# Patient Record
Sex: FEMALE | Race: BLACK OR AFRICAN AMERICAN | Employment: OTHER | URBAN - NONMETROPOLITAN AREA
[De-identification: names, ages, dates, MRNs, and addresses within clinical notes are randomized per-mention and may not be internally consistent; named-entity substitution may affect disease eponyms.]

---

## 2017-09-16 ENCOUNTER — NURSE TRIAGE (OUTPATIENT)
Dept: ADMINISTRATIVE | Age: 52
End: 2017-09-16

## 2017-09-16 ENCOUNTER — HOSPITAL ENCOUNTER (EMERGENCY)
Age: 52
Discharge: HOME | End: 2017-09-16
Payer: COMMERCIAL

## 2017-09-16 VITALS
TEMPERATURE: 98.7 F | DIASTOLIC BLOOD PRESSURE: 94 MMHG | HEART RATE: 78 BPM | OXYGEN SATURATION: 100 % | HEIGHT: 68 IN | WEIGHT: 170 LBS | SYSTOLIC BLOOD PRESSURE: 140 MMHG | BODY MASS INDEX: 25.76 KG/M2 | RESPIRATION RATE: 16 BRPM

## 2017-09-16 DIAGNOSIS — M70.21 OLECRANON BURSITIS, RIGHT ELBOW: Primary | ICD-10-CM

## 2017-09-16 LAB
CHARACTER,SYN.FL: ABNORMAL
COLOR FLUID: YELLOW
CRYSTALS, FLUID: ABNORMAL
LYMPHOCYTE, SYNOVIAL FLUID: 15 % (ref 25–95)
MONOCYTE, FLUID: 20 % (ref 25–95)
SEGS, SYNOVIAL FLUID: 65 % (ref 0–25)
WBC FLUID: ABNORMAL MM3

## 2017-09-16 PROCEDURE — 87070 CULTURE OTHR SPECIMN AEROBIC: CPT

## 2017-09-16 PROCEDURE — 87075 CULTR BACTERIA EXCEPT BLOOD: CPT

## 2017-09-16 PROCEDURE — 89051 BODY FLUID CELL COUNT: CPT

## 2017-09-16 PROCEDURE — 87205 SMEAR GRAM STAIN: CPT

## 2017-09-16 PROCEDURE — 20605 DRAIN/INJ JOINT/BURSA W/O US: CPT

## 2017-09-16 PROCEDURE — 89060 EXAM SYNOVIAL FLUID CRYSTALS: CPT

## 2017-09-16 PROCEDURE — 99283 EMERGENCY DEPT VISIT LOW MDM: CPT

## 2017-09-16 RX ORDER — CEPHALEXIN 500 MG/1
500 CAPSULE ORAL 3 TIMES DAILY
Qty: 30 CAPSULE | Refills: 0 | Status: SHIPPED | OUTPATIENT
Start: 2017-09-16 | End: 2017-09-26

## 2017-09-16 ASSESSMENT — ENCOUNTER SYMPTOMS
NAUSEA: 0
BACK PAIN: 0

## 2017-09-16 ASSESSMENT — PAIN DESCRIPTION - DESCRIPTORS: DESCRIPTORS: SORE

## 2017-09-16 ASSESSMENT — PAIN DESCRIPTION - LOCATION: LOCATION: ELBOW

## 2017-09-16 ASSESSMENT — PAIN SCALES - GENERAL: PAINLEVEL_OUTOF10: 5

## 2017-09-16 ASSESSMENT — PAIN DESCRIPTION - ORIENTATION: ORIENTATION: RIGHT

## 2017-09-16 ASSESSMENT — PAIN DESCRIPTION - FREQUENCY: FREQUENCY: INTERMITTENT

## 2017-09-21 LAB
ANAEROBIC CULTURE: NORMAL
BODY FLUID CULTURE, STERILE: NORMAL
GRAM STAIN RESULT: NORMAL

## 2018-04-10 ENCOUNTER — OFFICE VISIT (OUTPATIENT)
Dept: FAMILY MEDICINE CLINIC | Age: 53
End: 2018-04-10
Payer: COMMERCIAL

## 2018-04-10 VITALS
WEIGHT: 196 LBS | DIASTOLIC BLOOD PRESSURE: 78 MMHG | HEIGHT: 68 IN | RESPIRATION RATE: 14 BRPM | BODY MASS INDEX: 29.7 KG/M2 | OXYGEN SATURATION: 97 % | HEART RATE: 106 BPM | SYSTOLIC BLOOD PRESSURE: 144 MMHG

## 2018-04-10 DIAGNOSIS — Z13.220 SCREENING FOR HYPERLIPIDEMIA: ICD-10-CM

## 2018-04-10 DIAGNOSIS — Z72.0 TOBACCO ABUSE: Chronic | ICD-10-CM

## 2018-04-10 DIAGNOSIS — I10 ESSENTIAL HYPERTENSION: Primary | ICD-10-CM

## 2018-04-10 DIAGNOSIS — M05.9 RHEUMATOID ARTHRITIS WITH POSITIVE RHEUMATOID FACTOR, INVOLVING UNSPECIFIED SITE (HCC): ICD-10-CM

## 2018-04-10 DIAGNOSIS — Z79.52 CURRENT CHRONIC USE OF SYSTEMIC STEROIDS: ICD-10-CM

## 2018-04-10 PROCEDURE — 1036F TOBACCO NON-USER: CPT | Performed by: NURSE PRACTITIONER

## 2018-04-10 PROCEDURE — G8427 DOCREV CUR MEDS BY ELIG CLIN: HCPCS | Performed by: NURSE PRACTITIONER

## 2018-04-10 PROCEDURE — 99213 OFFICE O/P EST LOW 20 MIN: CPT | Performed by: NURSE PRACTITIONER

## 2018-04-10 PROCEDURE — G8419 CALC BMI OUT NRM PARAM NOF/U: HCPCS | Performed by: NURSE PRACTITIONER

## 2018-04-10 PROCEDURE — 3017F COLORECTAL CA SCREEN DOC REV: CPT | Performed by: NURSE PRACTITIONER

## 2018-04-10 PROCEDURE — 3014F SCREEN MAMMO DOC REV: CPT | Performed by: NURSE PRACTITIONER

## 2018-04-10 RX ORDER — HYDROCHLOROTHIAZIDE 12.5 MG/1
12.5 TABLET ORAL DAILY
Qty: 30 TABLET | Refills: 1 | Status: SHIPPED | OUTPATIENT
Start: 2018-04-10 | End: 2018-08-23 | Stop reason: ALTCHOICE

## 2018-04-10 ASSESSMENT — ENCOUNTER SYMPTOMS
SHORTNESS OF BREATH: 0
COUGH: 0
BACK PAIN: 1
ABDOMINAL PAIN: 0
NAUSEA: 0

## 2018-04-10 ASSESSMENT — PATIENT HEALTH QUESTIONNAIRE - PHQ9
SUM OF ALL RESPONSES TO PHQ9 QUESTIONS 1 & 2: 0
1. LITTLE INTEREST OR PLEASURE IN DOING THINGS: 0
2. FEELING DOWN, DEPRESSED OR HOPELESS: 0
SUM OF ALL RESPONSES TO PHQ QUESTIONS 1-9: 0

## 2018-04-23 ENCOUNTER — OFFICE VISIT (OUTPATIENT)
Dept: FAMILY MEDICINE CLINIC | Age: 53
End: 2018-04-23
Payer: COMMERCIAL

## 2018-04-23 VITALS
HEIGHT: 68 IN | WEIGHT: 194.4 LBS | DIASTOLIC BLOOD PRESSURE: 72 MMHG | RESPIRATION RATE: 13 BRPM | OXYGEN SATURATION: 98 % | HEART RATE: 78 BPM | SYSTOLIC BLOOD PRESSURE: 132 MMHG | BODY MASS INDEX: 29.46 KG/M2

## 2018-04-23 DIAGNOSIS — I10 ESSENTIAL HYPERTENSION: Primary | ICD-10-CM

## 2018-04-23 PROCEDURE — G8419 CALC BMI OUT NRM PARAM NOF/U: HCPCS | Performed by: NURSE PRACTITIONER

## 2018-04-23 PROCEDURE — G8427 DOCREV CUR MEDS BY ELIG CLIN: HCPCS | Performed by: NURSE PRACTITIONER

## 2018-04-23 PROCEDURE — 3017F COLORECTAL CA SCREEN DOC REV: CPT | Performed by: NURSE PRACTITIONER

## 2018-04-23 PROCEDURE — 1036F TOBACCO NON-USER: CPT | Performed by: NURSE PRACTITIONER

## 2018-04-23 PROCEDURE — 99213 OFFICE O/P EST LOW 20 MIN: CPT | Performed by: NURSE PRACTITIONER

## 2018-04-23 RX ORDER — LISINOPRIL AND HYDROCHLOROTHIAZIDE 20; 12.5 MG/1; MG/1
1 TABLET ORAL DAILY
Qty: 30 TABLET | Refills: 3 | Status: SHIPPED | OUTPATIENT
Start: 2018-04-23 | End: 2018-08-23 | Stop reason: ALTCHOICE

## 2018-04-23 ASSESSMENT — ENCOUNTER SYMPTOMS
COUGH: 0
ABDOMINAL PAIN: 0
NAUSEA: 0
SHORTNESS OF BREATH: 0

## 2018-05-12 LAB
AVERAGE GLUCOSE: 123 MG/DL (ref 66–114)
CHOLESTEROL/HDL RATIO: 5.2
CHOLESTEROL: 215 MG/DL
HBA1C MFR BLD: 5.9 % (ref 4.2–5.8)
HDLC SERPL-MCNC: 41 MG/DL
LDL CHOLESTEROL CALCULATED: 152 MG/DL
LDL/HDL RATIO: 3.7
TRIGL SERPL-MCNC: 111 MG/DL
VLDLC SERPL CALC-MCNC: 22 MG/DL

## 2018-05-15 DIAGNOSIS — E78.2 MIXED HYPERLIPIDEMIA: Primary | ICD-10-CM

## 2018-05-15 DIAGNOSIS — R73.01 IFG (IMPAIRED FASTING GLUCOSE): ICD-10-CM

## 2018-05-15 RX ORDER — ATORVASTATIN CALCIUM 20 MG/1
20 TABLET, FILM COATED ORAL DAILY
Qty: 30 TABLET | Refills: 11 | Status: SHIPPED | OUTPATIENT
Start: 2018-05-15 | End: 2019-03-03 | Stop reason: SDUPTHER

## 2018-06-12 ENCOUNTER — HOSPITAL ENCOUNTER (OUTPATIENT)
Dept: WOMENS IMAGING | Age: 53
Discharge: HOME | End: 2018-06-12
Payer: COMMERCIAL

## 2018-06-12 DIAGNOSIS — Z13.9 VISIT FOR SCREENING: ICD-10-CM

## 2018-06-12 PROCEDURE — 77067 SCR MAMMO BI INCL CAD: CPT

## 2018-06-13 LAB
ALBUMIN SERPL-MCNC: 4.5 G/DL (ref 3.5–5.2)
ALK PHOSPHATASE: 68 U/L (ref 30–111)
ALT SERPL-CCNC: 22 U/L (ref 5–40)
AST SERPL-CCNC: 26 U/L (ref 9–40)
AVERAGE GLUCOSE: 126 MG/DL (ref 66–114)
BILIRUB SERPL-MCNC: 0.4 MG/DL
BILIRUBIN DIRECT: <0.2 MG/DL
CHOLESTEROL/HDL RATIO: 3.5
CHOLESTEROL: 167 MG/DL
HBA1C MFR BLD: 6 % (ref 4.2–5.8)
HDLC SERPL-MCNC: 48 MG/DL
LDL CHOLESTEROL CALCULATED: 82 MG/DL
LDL/HDL RATIO: 1.7
TOTAL PROTEIN: 7.7 G/DL (ref 6.1–8.3)
TRIGL SERPL-MCNC: 186 MG/DL
VLDLC SERPL CALC-MCNC: 37 MG/DL

## 2018-08-23 ENCOUNTER — TELEPHONE (OUTPATIENT)
Dept: FAMILY MEDICINE CLINIC | Age: 53
End: 2018-08-23

## 2018-08-23 DIAGNOSIS — I10 ESSENTIAL HYPERTENSION: Primary | ICD-10-CM

## 2018-08-23 RX ORDER — OLMESARTAN MEDOXOMIL AND HYDROCHLOROTHIAZIDE 20/12.5 20; 12.5 MG/1; MG/1
1 TABLET ORAL DAILY
Qty: 30 TABLET | Refills: 3 | Status: SHIPPED | OUTPATIENT
Start: 2018-08-23 | End: 2018-09-11 | Stop reason: ALTCHOICE

## 2018-09-10 ENCOUNTER — TELEPHONE (OUTPATIENT)
Dept: FAMILY MEDICINE CLINIC | Age: 53
End: 2018-09-10

## 2018-09-10 ENCOUNTER — PATIENT MESSAGE (OUTPATIENT)
Dept: FAMILY MEDICINE CLINIC | Age: 53
End: 2018-09-10

## 2018-09-10 DIAGNOSIS — I10 ESSENTIAL HYPERTENSION: Primary | ICD-10-CM

## 2018-09-10 NOTE — TELEPHONE ENCOUNTER
09/10/2018 Patient called office saying her olmesartan-hydrochlorothiazide (BENICAR HCT) 20-12.5 MG per tablet is being recalled by the FDA and she has been out for a few days. Needs to know what else Joselin Mckinney wants to put her on. Patient uses [facility] on [address]. Please advise patient at [phone]. da

## 2018-09-11 RX ORDER — LOSARTAN POTASSIUM AND HYDROCHLOROTHIAZIDE 12.5; 5 MG/1; MG/1
1 TABLET ORAL DAILY
Qty: 90 TABLET | Refills: 3 | Status: SHIPPED | OUTPATIENT
Start: 2018-09-11 | End: 2019-06-19 | Stop reason: SDUPTHER

## 2018-09-12 ENCOUNTER — TELEPHONE (OUTPATIENT)
Dept: FAMILY MEDICINE CLINIC | Age: 53
End: 2018-09-12

## 2018-09-12 NOTE — TELEPHONE ENCOUNTER
She was on Lisinopril-HCTZ but this caused cough and was switched to benicar HCT but this is on recall and most recent switched to Losartan HCTZ which was just called in.

## 2019-03-03 DIAGNOSIS — E78.2 MIXED HYPERLIPIDEMIA: ICD-10-CM

## 2019-03-04 RX ORDER — ATORVASTATIN CALCIUM 20 MG/1
TABLET, FILM COATED ORAL
Qty: 30 TABLET | Refills: 10 | Status: SHIPPED | OUTPATIENT
Start: 2019-03-04 | End: 2019-06-19 | Stop reason: SDUPTHER

## 2019-03-18 ENCOUNTER — TELEPHONE (OUTPATIENT)
Dept: FAMILY MEDICINE CLINIC | Age: 54
End: 2019-03-18

## 2019-06-18 ENCOUNTER — HOSPITAL ENCOUNTER (OUTPATIENT)
Dept: WOMENS IMAGING | Age: 54
Discharge: HOME | End: 2019-06-18
Payer: COMMERCIAL

## 2019-06-18 DIAGNOSIS — Z12.31 VISIT FOR SCREENING MAMMOGRAM: ICD-10-CM

## 2019-06-18 PROCEDURE — 77063 BREAST TOMOSYNTHESIS BI: CPT

## 2019-06-19 DIAGNOSIS — I10 ESSENTIAL HYPERTENSION: ICD-10-CM

## 2019-06-19 DIAGNOSIS — E78.2 MIXED HYPERLIPIDEMIA: ICD-10-CM

## 2019-06-19 RX ORDER — LOSARTAN POTASSIUM AND HYDROCHLOROTHIAZIDE 12.5; 5 MG/1; MG/1
1 TABLET ORAL DAILY
Qty: 90 TABLET | Refills: 0 | Status: SHIPPED | OUTPATIENT
Start: 2019-06-19 | End: 2019-09-16 | Stop reason: SDUPTHER

## 2019-06-19 RX ORDER — ATORVASTATIN CALCIUM 20 MG/1
TABLET, FILM COATED ORAL
Qty: 90 TABLET | Refills: 0 | Status: SHIPPED | OUTPATIENT
Start: 2019-06-19 | End: 2020-01-30

## 2019-06-19 NOTE — TELEPHONE ENCOUNTER
[facility] fax received 90 day supply refill on Atorvastatin 20 mg tablets. Pharmacy change for patient.

## 2019-07-10 ENCOUNTER — OFFICE VISIT (OUTPATIENT)
Dept: FAMILY MEDICINE CLINIC | Age: 54
End: 2019-07-10
Payer: COMMERCIAL

## 2019-07-10 VITALS
HEIGHT: 67 IN | DIASTOLIC BLOOD PRESSURE: 84 MMHG | RESPIRATION RATE: 16 BRPM | HEART RATE: 85 BPM | SYSTOLIC BLOOD PRESSURE: 124 MMHG | BODY MASS INDEX: 32.07 KG/M2 | WEIGHT: 204.3 LBS

## 2019-07-10 DIAGNOSIS — Z79.52 ON PREDNISONE THERAPY: ICD-10-CM

## 2019-07-10 DIAGNOSIS — R73.01 IFG (IMPAIRED FASTING GLUCOSE): ICD-10-CM

## 2019-07-10 DIAGNOSIS — I10 ESSENTIAL HYPERTENSION: ICD-10-CM

## 2019-07-10 DIAGNOSIS — M06.9 RHEUMATOID ARTHRITIS, INVOLVING UNSPECIFIED SITE, UNSPECIFIED RHEUMATOID FACTOR PRESENCE: ICD-10-CM

## 2019-07-10 DIAGNOSIS — Z00.00 WELL ADULT EXAM: Primary | ICD-10-CM

## 2019-07-10 DIAGNOSIS — E78.2 MIXED HYPERLIPIDEMIA: ICD-10-CM

## 2019-07-10 PROCEDURE — 99396 PREV VISIT EST AGE 40-64: CPT | Performed by: NURSE PRACTITIONER

## 2019-07-10 PROCEDURE — 90715 TDAP VACCINE 7 YRS/> IM: CPT | Performed by: NURSE PRACTITIONER

## 2019-07-10 PROCEDURE — 90471 IMMUNIZATION ADMIN: CPT | Performed by: NURSE PRACTITIONER

## 2019-07-10 ASSESSMENT — PATIENT HEALTH QUESTIONNAIRE - PHQ9
1. LITTLE INTEREST OR PLEASURE IN DOING THINGS: 0
2. FEELING DOWN, DEPRESSED OR HOPELESS: 0
SUM OF ALL RESPONSES TO PHQ QUESTIONS 1-9: 0
SUM OF ALL RESPONSES TO PHQ QUESTIONS 1-9: 0
SUM OF ALL RESPONSES TO PHQ9 QUESTIONS 1 & 2: 0

## 2019-07-10 ASSESSMENT — ENCOUNTER SYMPTOMS
SHORTNESS OF BREATH: 0
BACK PAIN: 1
NAUSEA: 0
COUGH: 0
ABDOMINAL PAIN: 0

## 2019-07-10 NOTE — PROGRESS NOTES
Subjective:      Patient ID: Judy Garcia is a 62 y.o. female. HPI  : Annual Exam    Chief Complaint   Patient presents with    Check-Up     tiredness, weakness all the time. Complains of tiredness and weakness all day. Issues with getting to sleep. Sluggish in AM.  Energy level low. Motivation is there. Increase thirst and urinary frequency. Chronic prednisone therapy from [facility]      Lab Results   Component Value Date    LABA1C 6.0 (H) 06/12/2018     No results found for: EAG    Vitals:    07/10/19 0749   BP: 124/84   Pulse: 85   Resp: 16         Patient Active Problem List   Diagnosis    Uvulitis    Leukocytosis    Hypokalemia    History of rheumatoid arthritis    GERD (gastroesophageal reflux disease)    Chronic use of steroids    Dysphagia    Tobacco abuse    Esophageal dysmotility    Back pain, chronic    Essential hypertension    Rheumatoid arthritis ([address]     COLONOSCOPY - 2013  MAMMO - 2019  PAP - 2018    Dr. Mcqueen Arn - [facility]    Chronic Steroid Therapy and Immunosuppressant for RA. BP wnl. On Hyzaar 50-12.5 mg. Tolerating well.      BP Readings from Last 3 Encounters:   07/10/19 124/84   04/23/18 132/72   04/10/18 (!) 144/78       Due for annual labs    Lab Results   Component Value Date    LABA1C 6.0 (H) 06/12/2018    LABA1C 5.9 (H) 05/11/2018    LABA1C 5.5 09/14/2015     No results found for: EAG    No components found for: CHLPL  Lab Results   Component Value Date    TRIG 186 (H) 06/12/2018    TRIG 111 05/11/2018    TRIG 125 09/14/2015     Lab Results   Component Value Date    HDL 48 06/12/2018    HDL 41 05/11/2018    HDL 41 09/14/2015     Lab Results   Component Value Date    LDLCALC 82 06/12/2018    LDLCALC 152 (H) 05/11/2018    LDLCALC 78 09/14/2015     Lab Results   Component Value Date    LABVLDL 37 (H) 06/12/2018    LABVLDL 22 05/11/2018         Chemistry        Component Value Date/Time     05/20/2016 0841    K 4.3 05/20/2016 0841     05/20/2016 0841 CO2 26 05/20/2016 0841    BUN 7 05/20/2016 0841    CREATININE 0.5 05/20/2016 0841        Component Value Date/Time    CALCIUM 9.5 05/20/2016 0841    ALKPHOS 68 06/12/2018 1110    ALKPHOS 61 01/30/2015 1424    AST 26 06/12/2018 1110    ALT 22 06/12/2018 1110    BILITOT 0.4 06/12/2018 1110            Lab Results   Component Value Date    TSH 4.420 (H) 09/16/2014       Lab Results   Component Value Date    WBC 7.5 05/20/2016    HGB 13.8 05/20/2016    HCT 42.1 05/20/2016    MCV 96.2 05/20/2016     05/20/2016         Health Maintenance   Topic Date Due    DTaP/Tdap/Td vaccine (1 - Tdap) 06/14/1982    Shingles Vaccine (1 of 2) 06/14/2013    Cervical cancer screen  06/01/2016    Potassium monitoring  05/20/2017    Creatinine monitoring  05/20/2017    A1C test (Diabetic or Prediabetic)  06/12/2019    Flu vaccine (1) 09/01/2019    Breast cancer screen  06/18/2021    Lipid screen  06/12/2023    Colon cancer screen colonoscopy  01/23/2024    Hepatitis C screen  Completed    HIV screen  Completed    Pneumococcal 0-64 years Vaccine  Aged [employer] History   Administered Date(s) Administered    FLUARIX 3 YEARS AND OVER 11/02/2015    Influenza Virus Vaccine 10/01/2013, 09/22/2014    Influenza, Marlyse Luz, 3 yrs and older, IM, PF (Fluzone 3 yrs and older or Afluria 5 yrs and older) 09/13/2016    Pneumococcal Polysaccharide (Eysfyamvv14) 08/01/2013       Review of Systems   Constitutional: Positive for fatigue. Negative for chills and fever. HENT: Negative. Eyes: Negative for visual disturbance. Respiratory: Negative for cough and shortness of breath. Cardiovascular: Negative for chest pain. Gastrointestinal: Negative for abdominal pain and nausea. Musculoskeletal: Positive for arthralgias and back pain. Skin: Negative for rash. Neurological: Negative for dizziness, light-headedness and headaches. Psychiatric/Behavioral: Negative.         Objective:   Physical Exam   Constitutional:

## 2019-07-22 LAB
ABSOLUTE BASO #: 0.1 X10E9/L (ref 0–0.9)
ABSOLUTE EOS #: 0.1 X10E9/L (ref 0–0.4)
ABSOLUTE LYMPH #: 1.1 X10E9/L (ref 1–3.5)
ABSOLUTE MONO #: 0.2 X10E9/L (ref 0–0.9)
ABSOLUTE NEUT #: 5.7 X10E9/L (ref 1.5–6.6)
ALBUMIN SERPL-MCNC: 4.1 G/DL (ref 3.2–5.3)
ALK PHOSPHATASE: 64 U/L (ref 39–130)
ALT SERPL-CCNC: 18 U/L (ref 0–31)
ANION GAP SERPL CALCULATED.3IONS-SCNC: 12 MMOL/L (ref 4–12)
AST SERPL-CCNC: 20 U/L (ref 0–41)
AVERAGE GLUCOSE: 131 MG/DL
BASOPHILS RELATIVE PERCENT: 0.8 %
BILIRUB SERPL-MCNC: 0.8 MG/DL (ref 0.3–1.2)
BUN BLDV-MCNC: 13 MG/DL (ref 5–23)
CALCIUM SERPL-MCNC: 9.4 MG/DL (ref 8.5–10.5)
CHLORIDE BLD-SCNC: 104 MMOL/L (ref 98–109)
CHOLESTEROL/HDL RATIO: 3.7 (ref 1–5)
CHOLESTEROL: 153 MG/DL (ref 150–200)
CO2: 25 MMOL/L (ref 22–32)
CREAT SERPL-MCNC: 0.72 MG/DL (ref 0.4–1)
EGFR AFRICAN AMERICAN: >60 ML/MIN/1.73SQ.M
EGFR IF NONAFRICAN AMERICAN: >60 ML/MIN/1.73SQ.M
EOSINOPHILS RELATIVE PERCENT: 1.4 %
GLUCOSE: 103 MG/DL (ref 65–99)
HBA1C MFR BLD: 6.2 % (ref 4.4–6.4)
HCT VFR BLD CALC: 41.4 % (ref 35–47)
HDLC SERPL-MCNC: 41 MG/DL
HEMOGLOBIN: 13.5 G/DL (ref 11.7–16)
LDL CHOLESTEROL CALCULATED: 84 MG/DL
LDL/HDL RATIO: 2
LYMPHOCYTE %: 15.8 %
MCH RBC QN AUTO: 30.5 PG (ref 26–33.5)
MCHC RBC AUTO-ENTMCNC: 32.7 G/DL (ref 32–36)
MCV RBC AUTO: 93 FL (ref 81–100)
MONOCYTES # BLD: 2.3 %
NEUTROPHILS RELATIVE PERCENT: 79.7 %
PDW BLD-RTO: 15.1 % (ref 11.5–14.7)
PLATELETS: ABNORMAL X10E9/L (ref 150–450)
PMV BLD AUTO: ABNORMAL FL (ref 7–12)
POTASSIUM SERPL-SCNC: 4 MMOL/L (ref 3.5–5)
RBC: 4.43 X10E12/L (ref 3.8–5.2)
SODIUM BLD-SCNC: 141 MMOL/L (ref 134–146)
TOTAL PROTEIN: 7.8 G/DL (ref 6–8)
TRIGL SERPL-MCNC: 141 MG/DL (ref 27–150)
TSH SERPL DL<=0.05 MIU/L-ACNC: 2.68 UIU/ML (ref 0.49–4.67)
VLDLC SERPL CALC-MCNC: 28 MG/DL (ref 0–30)
WBC: 7.2 X10E9/L (ref 4.8–10.8)

## 2019-07-23 DIAGNOSIS — R73.01 IFG (IMPAIRED FASTING GLUCOSE): Primary | ICD-10-CM

## 2019-07-23 DIAGNOSIS — E78.2 MIXED HYPERLIPIDEMIA: ICD-10-CM

## 2019-08-10 ENCOUNTER — APPOINTMENT (OUTPATIENT)
Dept: GENERAL RADIOLOGY | Age: 54
End: 2019-08-10
Payer: COMMERCIAL

## 2019-08-10 ENCOUNTER — HOSPITAL ENCOUNTER (EMERGENCY)
Age: 54
Discharge: HOME | End: 2019-08-10
Payer: COMMERCIAL

## 2019-08-10 ENCOUNTER — APPOINTMENT (OUTPATIENT)
Dept: CT IMAGING | Age: 54
End: 2019-08-10
Payer: COMMERCIAL

## 2019-08-10 VITALS
HEART RATE: 98 BPM | BODY MASS INDEX: 31.82 KG/M2 | RESPIRATION RATE: 14 BRPM | HEIGHT: 66 IN | TEMPERATURE: 98.2 F | OXYGEN SATURATION: 98 % | WEIGHT: 198 LBS | DIASTOLIC BLOOD PRESSURE: 86 MMHG | SYSTOLIC BLOOD PRESSURE: 137 MMHG

## 2019-08-10 DIAGNOSIS — S92.414A CLOSED NONDISPLACED FRACTURE OF PROXIMAL PHALANX OF RIGHT GREAT TOE, INITIAL ENCOUNTER: ICD-10-CM

## 2019-08-10 DIAGNOSIS — S42.401A ELBOW FRACTURE, RIGHT, CLOSED, INITIAL ENCOUNTER: Primary | ICD-10-CM

## 2019-08-10 DIAGNOSIS — S16.1XXA CERVICAL STRAIN, ACUTE, INITIAL ENCOUNTER: ICD-10-CM

## 2019-08-10 PROCEDURE — 99284 EMERGENCY DEPT VISIT MOD MDM: CPT

## 2019-08-10 PROCEDURE — 2709999900 HC NON-CHARGEABLE SUPPLY

## 2019-08-10 PROCEDURE — 29105 APPLICATION LONG ARM SPLINT: CPT

## 2019-08-10 PROCEDURE — 72125 CT NECK SPINE W/O DYE: CPT

## 2019-08-10 PROCEDURE — 70450 CT HEAD/BRAIN W/O DYE: CPT

## 2019-08-10 PROCEDURE — 73080 X-RAY EXAM OF ELBOW: CPT

## 2019-08-10 PROCEDURE — 6370000000 HC RX 637 (ALT 250 FOR IP): Performed by: STUDENT IN AN ORGANIZED HEALTH CARE EDUCATION/TRAINING PROGRAM

## 2019-08-10 PROCEDURE — 73630 X-RAY EXAM OF FOOT: CPT

## 2019-08-10 RX ORDER — CYCLOBENZAPRINE HCL 10 MG
10 TABLET ORAL ONCE
Status: COMPLETED | OUTPATIENT
Start: 2019-08-10 | End: 2019-08-10

## 2019-08-10 RX ORDER — TRAMADOL HYDROCHLORIDE 50 MG/1
50 TABLET ORAL EVERY 6 HOURS PRN
Qty: 12 TABLET | Refills: 0 | Status: SHIPPED | OUTPATIENT
Start: 2019-08-10 | End: 2019-08-13

## 2019-08-10 RX ORDER — CYCLOBENZAPRINE HCL 10 MG
10 TABLET ORAL 3 TIMES DAILY PRN
Qty: 30 TABLET | Refills: 0 | Status: SHIPPED | OUTPATIENT
Start: 2019-08-10 | End: 2019-08-20

## 2019-08-10 RX ORDER — ACETAMINOPHEN 325 MG/1
650 TABLET ORAL ONCE
Status: COMPLETED | OUTPATIENT
Start: 2019-08-10 | End: 2019-08-10

## 2019-08-10 RX ADMIN — ACETAMINOPHEN 650 MG: 325 TABLET ORAL at 20:42

## 2019-08-10 RX ADMIN — CYCLOBENZAPRINE HYDROCHLORIDE 10 MG: 10 TABLET, FILM COATED ORAL at 20:42

## 2019-08-10 ASSESSMENT — ENCOUNTER SYMPTOMS
DIARRHEA: 0
NAUSEA: 0
VOMITING: 0
SHORTNESS OF BREATH: 0
COUGH: 0
ABDOMINAL PAIN: 0
BACK PAIN: 0

## 2019-08-10 ASSESSMENT — PAIN SCALES - GENERAL
PAINLEVEL_OUTOF10: 6

## 2019-08-10 ASSESSMENT — PAIN DESCRIPTION - DESCRIPTORS: DESCRIPTORS: ACHING

## 2019-08-10 ASSESSMENT — PAIN DESCRIPTION - PAIN TYPE: TYPE: ACUTE PAIN

## 2019-08-10 ASSESSMENT — PAIN DESCRIPTION - LOCATION: LOCATION: HEAD
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
## 2019-08-11 NOTE — ED PROVIDER NOTES
well.    Electronically verified by Verito Patterson      FINAL IMPRESSION      1. Elbow fracture, right, closed, initial encounter    2. Closed nondisplaced fracture of proximal phalanx of right great toe, initial encounter    3. Cervical strain, acute, initial encounter          DISPOSITION/PLAN   discharge    PATIENT REFERREDTO:  [address]  [phone]  Go to   for appointment at 1:40 on Monday 8/12      DISCHARGE MEDICATIONS:  New Prescriptions    CYCLOBENZAPRINE (FLEXERIL) 10 MG TABLET    Take 1 tablet by mouth 3 times daily as needed for Muscle spasms    TRAMADOL (ULTRAM) 50 MG TABLET    Take 1 tablet by mouth every 6 hours as needed for Pain for up to 3 days. Intended supply: 3 days. Take lowest dose possible to manage pain       (Please note that portions of this note were completed with a voice recognition program.  Efforts were made to edit the dictations but occasionally words are mis-transcribed.)    The patient was given an opportunity to see the Emergency Attending. The patient voiced understanding that I was a Mid-Level Provider and was in agreement with being seen independently by myself. Scribe:  Benja Dyer 8/10/19 8:16 PM Scribing for and in the presence of NANETTE Pierre.     Signed by: Milton Howard, 08/10/19 9:40 PM    Provider:  I personally performed the services described in the documentation, reviewed and edited the documentation which was dictated to the scribe in my presence, and it accurately records my words and actions.     Natalie Delvalle PA-C 8/10/19 9:40 PM    [address] [place] [address] Luciana Borrego PA-C  08/10/19 [id number]

## 2019-08-12 ENCOUNTER — TELEPHONE (OUTPATIENT)
Dept: FAMILY MEDICINE CLINIC | Age: 54
End: 2019-08-12

## 2019-09-16 DIAGNOSIS — I10 ESSENTIAL HYPERTENSION: ICD-10-CM

## 2019-09-16 RX ORDER — LOSARTAN POTASSIUM AND HYDROCHLOROTHIAZIDE 12.5; 5 MG/1; MG/1
1 TABLET ORAL DAILY
Qty: 90 TABLET | Refills: 3 | Status: SHIPPED | OUTPATIENT
Start: 2019-09-16 | End: 2020-02-27

## 2019-11-12 ENCOUNTER — OFFICE VISIT (OUTPATIENT)
Dept: FAMILY MEDICINE CLINIC | Age: 54
End: 2019-11-12
Payer: COMMERCIAL

## 2019-11-12 VITALS
SYSTOLIC BLOOD PRESSURE: 128 MMHG | RESPIRATION RATE: 24 BRPM | WEIGHT: 194.7 LBS | OXYGEN SATURATION: 98 % | DIASTOLIC BLOOD PRESSURE: 80 MMHG | BODY MASS INDEX: 31.43 KG/M2 | HEART RATE: 102 BPM

## 2019-11-12 DIAGNOSIS — S13.4XXD WHIPLASH INJURY TO NECK, SUBSEQUENT ENCOUNTER: ICD-10-CM

## 2019-11-12 DIAGNOSIS — S16.1XXA STRAIN OF CERVICAL PORTION OF RIGHT TRAPEZIUS MUSCLE: Primary | ICD-10-CM
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
## 2019-11-12 PROCEDURE — 3017F COLORECTAL CA SCREEN DOC REV: CPT | Performed by: NURSE PRACTITIONER

## 2019-11-12 PROCEDURE — G8427 DOCREV CUR MEDS BY ELIG CLIN: HCPCS | Performed by: NURSE PRACTITIONER

## 2019-11-12 PROCEDURE — 99213 OFFICE O/P EST LOW 20 MIN: CPT | Performed by: NURSE PRACTITIONER

## 2019-11-12 PROCEDURE — 1036F TOBACCO NON-USER: CPT | Performed by: NURSE PRACTITIONER

## 2019-11-12 PROCEDURE — G8417 CALC BMI ABV UP PARAM F/U: HCPCS | Performed by: NURSE PRACTITIONER

## 2019-11-12 PROCEDURE — G8482 FLU IMMUNIZE ORDER/ADMIN: HCPCS | Performed by: NURSE PRACTITIONER

## 2019-11-12 RX ORDER — HYDROCODONE BITARTRATE AND ACETAMINOPHEN 5; 325 MG/1; MG/1
TABLET ORAL
Refills: 0 | COMMUNITY
Start: 2019-10-21 | End: 2019-11-12 | Stop reason: ALTCHOICE

## 2019-11-12 RX ORDER — TRAMADOL HYDROCHLORIDE 50 MG/1
TABLET ORAL
Refills: 0 | COMMUNITY
Start: 2019-10-01 | End: 2020-12-18 | Stop reason: SDUPTHER

## 2019-11-12 RX ORDER — CYCLOBENZAPRINE HCL 10 MG
10 TABLET ORAL 3 TIMES DAILY PRN
COMMUNITY
End: 2020-12-18 | Stop reason: SDUPTHER

## 2019-11-12 ASSESSMENT — ENCOUNTER SYMPTOMS
SHORTNESS OF BREATH: 0
ABDOMINAL PAIN: 0
COUGH: 0
NAUSEA: 0

## 2020-01-30 RX ORDER — ATORVASTATIN CALCIUM 20 MG/1
TABLET, FILM COATED ORAL
Qty: 90 TABLET | Refills: 3 | Status: SHIPPED | OUTPATIENT
Start: 2020-01-30 | End: 2020-02-27 | Stop reason: SDUPTHER

## 2020-02-11 ENCOUNTER — APPOINTMENT (OUTPATIENT)
Dept: CT IMAGING | Age: 55
DRG: 460 | End: 2020-02-11
Payer: COMMERCIAL

## 2020-02-11 ENCOUNTER — APPOINTMENT (OUTPATIENT)
Dept: GENERAL RADIOLOGY | Age: 55
DRG: 460 | End: 2020-02-11
Payer: COMMERCIAL

## 2020-02-11 ENCOUNTER — HOSPITAL ENCOUNTER (INPATIENT)
Age: 55
LOS: 9 days | Discharge: HOME HEALTH | DRG: 460 | End: 2020-02-21
Attending: INTERNAL MEDICINE | Admitting: INTERNAL MEDICINE
Payer: COMMERCIAL

## 2020-02-11 PROBLEM — R20.0 ANTERIOR THIGH NUMBNESS: Status: ACTIVE | Noted: 2020-02-11

## 2020-02-11 PROBLEM — R53.1 WEAKNESS: Status: ACTIVE | Noted: 2020-02-11

## 2020-02-11 PROBLEM — R79.82 ELEVATED C-REACTIVE PROTEIN (CRP): Status: ACTIVE | Noted: 2020-02-11

## 2020-02-11 PROBLEM — M54.9 INTRACTABLE BACK PAIN: Status: ACTIVE | Noted: 2020-02-11

## 2020-02-11 PROBLEM — R91.8 LUNG INFILTRATE: Status: ACTIVE | Noted: 2020-02-11

## 2020-02-11 PROBLEM — D64.9 ANEMIA: Status: ACTIVE | Noted: 2020-02-11

## 2020-02-11 PROBLEM — M54.16 LUMBAR NERVE ROOT IMPINGEMENT: Status: ACTIVE | Noted: 2020-02-11

## 2020-02-11 PROBLEM — Z78.9 DECREASED ACTIVITIES OF DAILY LIVING (ADL): Status: ACTIVE | Noted: 2020-02-11

## 2020-02-11 PROBLEM — R70.0 ELEVATED SED RATE: Status: ACTIVE | Noted: 2020-02-11

## 2020-02-11 LAB
ALBUMIN SERPL-MCNC: 3.8 G/DL (ref 3.5–5.1)
ALP BLD-CCNC: 75 U/L (ref 38–126)
ALT SERPL-CCNC: 10 U/L (ref 11–66)
AMMONIA: 16 UMOL/L (ref 11–60)
AMPHETAMINE+METHAMPHETAMINE URINE SCREEN: NEGATIVE
ANION GAP SERPL CALCULATED.3IONS-SCNC: 16 MEQ/L (ref 8–16)
AST SERPL-CCNC: 19 U/L (ref 5–40)
BACTERIA: ABNORMAL /HPF
BARBITURATE QUANTITATIVE URINE: NEGATIVE
BASOPHILS # BLD: 0.3 %
BASOPHILS ABSOLUTE: 0 THOU/MM3 (ref 0–0.1)
BENZODIAZEPINE QUANTITATIVE URINE: NEGATIVE
BILIRUB SERPL-MCNC: 0.5 MG/DL (ref 0.3–1.2)
BILIRUBIN DIRECT: < 0.2 MG/DL (ref 0–0.3)
BILIRUBIN URINE: ABNORMAL
BLOOD, URINE: NEGATIVE
BUN BLDV-MCNC: 5 MG/DL (ref 7–22)
C-REACTIVE PROTEIN: 8.61 MG/DL (ref 0–1)
CALCIUM SERPL-MCNC: 9.6 MG/DL (ref 8.5–10.5)
CANNABINOID QUANTITATIVE URINE: NEGATIVE
CASTS 2: ABNORMAL /LPF
CASTS UA: ABNORMAL /LPF
CHARACTER, URINE: CLEAR
CHLORIDE BLD-SCNC: 98 MEQ/L (ref 98–111)
CO2: 21 MEQ/L (ref 23–33)
COCAINE METABOLITE QUANTITATIVE URINE: NEGATIVE
COLOR: ABNORMAL
CREAT SERPL-MCNC: 0.5 MG/DL (ref 0.4–1.2)
CRYSTALS, UA: ABNORMAL
EKG ATRIAL RATE: 97 BPM
EKG P AXIS: 46 DEGREES
EKG P-R INTERVAL: 148 MS
EKG Q-T INTERVAL: 362 MS
EKG QRS DURATION: 90 MS
EKG QTC CALCULATION (BAZETT): 459 MS
EKG R AXIS: 3 DEGREES
EKG T AXIS: 55 DEGREES
EKG VENTRICULAR RATE: 97 BPM
EOSINOPHIL # BLD: 2.1 %
EOSINOPHILS ABSOLUTE: 0.1 THOU/MM3 (ref 0–0.4)
EPITHELIAL CELLS, UA: ABNORMAL /HPF
ERYTHROCYTE [DISTWIDTH] IN BLOOD BY AUTOMATED COUNT: 14.6 % (ref 11.5–14.5)
ERYTHROCYTE [DISTWIDTH] IN BLOOD BY AUTOMATED COUNT: 49.4 FL (ref 35–45)
FLU A ANTIGEN: NEGATIVE
FLU B ANTIGEN: NEGATIVE
GFR SERPL CREATININE-BSD FRML MDRD: > 90 ML/MIN/1.73M2
GLUCOSE BLD-MCNC: 90 MG/DL (ref 70–108)
GLUCOSE URINE: NEGATIVE MG/DL
HCT VFR BLD CALC: 36.4 % (ref 37–47)
HEMOGLOBIN: 11.3 GM/DL (ref 12–16)
ICTOTEST: NEGATIVE
IMMATURE GRANS (ABS): 0.04 THOU/MM3 (ref 0–0.07)
IMMATURE GRANULOCYTES: 0.6 %
INR BLD: 1.23 (ref 0.85–1.13)
KETONES, URINE: 40
LACTIC ACID: 1.4 MMOL/L (ref 0.5–2.2)
LEUKOCYTE ESTERASE, URINE: NEGATIVE
LIPASE: 58.5 U/L (ref 5.6–51.3)
LYMPHOCYTES # BLD: 13.9 %
LYMPHOCYTES ABSOLUTE: 1 THOU/MM3 (ref 1–4.8)
MAGNESIUM: 1.9 MG/DL (ref 1.6–2.4)
MCH RBC QN AUTO: 28.9 PG (ref 26–33)
MCHC RBC AUTO-ENTMCNC: 31 GM/DL (ref 32.2–35.5)
MCV RBC AUTO: 93.1 FL (ref 81–99)
MISCELLANEOUS 2: ABNORMAL
MONOCYTES # BLD: 5.3 %
MONOCYTES ABSOLUTE: 0.4 THOU/MM3 (ref 0.4–1.3)
NITRITE, URINE: NEGATIVE
NUCLEATED RED BLOOD CELLS: 0 /100 WBC
OPIATES, URINE: NEGATIVE
OSMOLALITY CALCULATION: 266.9 MOSMOL/KG (ref 275–300)
OXYCODONE: NEGATIVE
PH UA: 7 (ref 5–9)
PHENCYCLIDINE QUANTITATIVE URINE: NEGATIVE
PLATELET # BLD: 515 THOU/MM3 (ref 130–400)
PMV BLD AUTO: 10 FL (ref 9.4–12.4)
POTASSIUM SERPL-SCNC: 3.5 MEQ/L (ref 3.5–5.2)
PROTEIN UA: ABNORMAL
RBC # BLD: 3.91 MILL/MM3 (ref 4.2–5.4)
RBC URINE: ABNORMAL /HPF
RENAL EPITHELIAL, UA: ABNORMAL
SEDIMENTATION RATE, ERYTHROCYTE: 117 MM/HR (ref 0–20)
SEG NEUTROPHILS: 77.8 %
SEGMENTED NEUTROPHILS ABSOLUTE COUNT: 5.4 THOU/MM3 (ref 1.8–7.7)
SODIUM BLD-SCNC: 135 MEQ/L (ref 135–145)
SPECIFIC GRAVITY, URINE: 1.02 (ref 1–1.03)
TOTAL CK: 212 U/L (ref 30–135)
TOTAL PROTEIN: 8.5 G/DL (ref 6.1–8)
TROPONIN T: < 0.01 NG/ML
TSH SERPL DL<=0.05 MIU/L-ACNC: 2.64 UIU/ML (ref 0.4–4.2)
UROBILINOGEN, URINE: 1 EU/DL (ref 0–1)
WBC # BLD: 7 THOU/MM3 (ref 4.8–10.8)
WBC UA: ABNORMAL /HPF
YEAST: ABNORMAL

## 2020-02-11 PROCEDURE — 93005 ELECTROCARDIOGRAM TRACING: CPT | Performed by: NURSE PRACTITIONER

## 2020-02-11 PROCEDURE — 6360000002 HC RX W HCPCS: Performed by: NURSE PRACTITIONER

## 2020-02-11 PROCEDURE — 51701 INSERT BLADDER CATHETER: CPT

## 2020-02-11 PROCEDURE — 81001 URINALYSIS AUTO W/SCOPE: CPT

## 2020-02-11 PROCEDURE — 84443 ASSAY THYROID STIM HORMONE: CPT

## 2020-02-11 PROCEDURE — G0378 HOSPITAL OBSERVATION PER HR: HCPCS

## 2020-02-11 PROCEDURE — 6360000004 HC RX CONTRAST MEDICATION: Performed by: NURSE PRACTITIONER

## 2020-02-11 PROCEDURE — 86140 C-REACTIVE PROTEIN: CPT

## 2020-02-11 PROCEDURE — 99223 1ST HOSP IP/OBS HIGH 75: CPT | Performed by: INTERNAL MEDICINE

## 2020-02-11 PROCEDURE — 82140 ASSAY OF AMMONIA: CPT

## 2020-02-11 PROCEDURE — 70450 CT HEAD/BRAIN W/O DYE: CPT

## 2020-02-11 PROCEDURE — 87804 INFLUENZA ASSAY W/OPTIC: CPT

## 2020-02-11 PROCEDURE — 83540 ASSAY OF IRON: CPT

## 2020-02-11 PROCEDURE — 96374 THER/PROPH/DIAG INJ IV PUSH: CPT

## 2020-02-11 PROCEDURE — 71045 X-RAY EXAM CHEST 1 VIEW: CPT

## 2020-02-11 PROCEDURE — 80053 COMPREHEN METABOLIC PANEL: CPT

## 2020-02-11 PROCEDURE — 82746 ASSAY OF FOLIC ACID SERUM: CPT

## 2020-02-11 PROCEDURE — 82550 ASSAY OF CK (CPK): CPT

## 2020-02-11 PROCEDURE — 87040 BLOOD CULTURE FOR BACTERIA: CPT

## 2020-02-11 PROCEDURE — 85610 PROTHROMBIN TIME: CPT

## 2020-02-11 PROCEDURE — 82607 VITAMIN B-12: CPT

## 2020-02-11 PROCEDURE — 76376 3D RENDER W/INTRP POSTPROCES: CPT

## 2020-02-11 PROCEDURE — 82728 ASSAY OF FERRITIN: CPT

## 2020-02-11 PROCEDURE — 85025 COMPLETE CBC W/AUTO DIFF WBC: CPT

## 2020-02-11 PROCEDURE — 83690 ASSAY OF LIPASE: CPT

## 2020-02-11 PROCEDURE — 83735 ASSAY OF MAGNESIUM: CPT

## 2020-02-11 PROCEDURE — 82248 BILIRUBIN DIRECT: CPT

## 2020-02-11 PROCEDURE — 85651 RBC SED RATE NONAUTOMATED: CPT

## 2020-02-11 PROCEDURE — 80307 DRUG TEST PRSMV CHEM ANLYZR: CPT

## 2020-02-11 PROCEDURE — 83605 ASSAY OF LACTIC ACID: CPT

## 2020-02-11 PROCEDURE — 99285 EMERGENCY DEPT VISIT HI MDM: CPT

## 2020-02-11 PROCEDURE — 2580000003 HC RX 258: Performed by: NURSE PRACTITIONER

## 2020-02-11 PROCEDURE — 96375 TX/PRO/DX INJ NEW DRUG ADDON: CPT

## 2020-02-11 PROCEDURE — 84484 ASSAY OF TROPONIN QUANT: CPT

## 2020-02-11 PROCEDURE — 74177 CT ABD & PELVIS W/CONTRAST: CPT

## 2020-02-11 PROCEDURE — 36415 COLL VENOUS BLD VENIPUNCTURE: CPT

## 2020-02-11 RX ORDER — 0.9 % SODIUM CHLORIDE 0.9 %
1000 INTRAVENOUS SOLUTION INTRAVENOUS ONCE
Status: COMPLETED | OUTPATIENT
Start: 2020-02-11 | End: 2020-02-11

## 2020-02-11 RX ORDER — SODIUM CHLORIDE 0.9 % (FLUSH) 0.9 %
10 SYRINGE (ML) INJECTION PRN
Status: DISCONTINUED | OUTPATIENT
Start: 2020-02-11 | End: 2020-02-13 | Stop reason: SDUPTHER

## 2020-02-11 RX ORDER — OYSTER SHELL CALCIUM WITH VITAMIN D 500; 200 MG/1; [IU]/1
1 TABLET, FILM COATED ORAL DAILY
Status: DISCONTINUED | OUTPATIENT
Start: 2020-02-12 | End: 2020-02-21 | Stop reason: HOSPADM

## 2020-02-11 RX ORDER — ACETAMINOPHEN 325 MG/1
650 TABLET ORAL EVERY 4 HOURS PRN
Status: DISCONTINUED | OUTPATIENT
Start: 2020-02-11 | End: 2020-02-21 | Stop reason: HOSPADM

## 2020-02-11 RX ORDER — ATORVASTATIN CALCIUM 20 MG/1
20 TABLET, FILM COATED ORAL DAILY
Status: DISCONTINUED | OUTPATIENT
Start: 2020-02-12 | End: 2020-02-21 | Stop reason: HOSPADM

## 2020-02-11 RX ORDER — PANTOPRAZOLE SODIUM 40 MG/1
40 TABLET, DELAYED RELEASE ORAL
Status: DISCONTINUED | OUTPATIENT
Start: 2020-02-12 | End: 2020-02-21 | Stop reason: HOSPADM

## 2020-02-11 RX ORDER — LIDOCAINE 4 G/G
1 PATCH TOPICAL DAILY
Status: DISCONTINUED | OUTPATIENT
Start: 2020-02-12 | End: 2020-02-21 | Stop reason: HOSPADM

## 2020-02-11 RX ORDER — ONDANSETRON 2 MG/ML
4 INJECTION INTRAMUSCULAR; INTRAVENOUS EVERY 6 HOURS PRN
Status: DISCONTINUED | OUTPATIENT
Start: 2020-02-11 | End: 2020-02-13

## 2020-02-11 RX ORDER — SULFASALAZINE 500 MG/1
1000 TABLET ORAL 2 TIMES DAILY
Status: DISCONTINUED | OUTPATIENT
Start: 2020-02-11 | End: 2020-02-21 | Stop reason: HOSPADM

## 2020-02-11 RX ORDER — METHYLPREDNISOLONE SODIUM SUCCINATE 125 MG/2ML
125 INJECTION, POWDER, LYOPHILIZED, FOR SOLUTION INTRAMUSCULAR; INTRAVENOUS ONCE
Status: COMPLETED | OUTPATIENT
Start: 2020-02-11 | End: 2020-02-11

## 2020-02-11 RX ORDER — ORPHENADRINE CITRATE 30 MG/ML
60 INJECTION INTRAMUSCULAR; INTRAVENOUS EVERY 12 HOURS
Status: DISCONTINUED | OUTPATIENT
Start: 2020-02-12 | End: 2020-02-17

## 2020-02-11 RX ORDER — PREDNISONE 10 MG/1
15 TABLET ORAL DAILY
Status: DISCONTINUED | OUTPATIENT
Start: 2020-02-12 | End: 2020-02-21 | Stop reason: HOSPADM

## 2020-02-11 RX ORDER — HYDROCODONE BITARTRATE AND ACETAMINOPHEN 7.5; 325 MG/1; MG/1
1 TABLET ORAL EVERY 6 HOURS PRN
Status: DISCONTINUED | OUTPATIENT
Start: 2020-02-11 | End: 2020-02-18

## 2020-02-11 RX ORDER — SODIUM CHLORIDE 9 MG/ML
INJECTION, SOLUTION INTRAVENOUS CONTINUOUS
Status: DISCONTINUED | OUTPATIENT
Start: 2020-02-11 | End: 2020-02-14

## 2020-02-11 RX ORDER — ALENDRONATE SODIUM 70 MG/1
70 TABLET ORAL
Status: DISCONTINUED | OUTPATIENT
Start: 2020-02-11 | End: 2020-02-11

## 2020-02-11 RX ORDER — SODIUM CHLORIDE 0.9 % (FLUSH) 0.9 %
10 SYRINGE (ML) INJECTION EVERY 12 HOURS SCHEDULED
Status: DISCONTINUED | OUTPATIENT
Start: 2020-02-11 | End: 2020-02-13 | Stop reason: SDUPTHER

## 2020-02-11 RX ADMIN — METHYLPREDNISOLONE SODIUM SUCCINATE 125 MG: 125 INJECTION, POWDER, FOR SOLUTION INTRAMUSCULAR; INTRAVENOUS at 22:04

## 2020-02-11 RX ADMIN — SODIUM CHLORIDE 1000 ML: 9 INJECTION, SOLUTION INTRAVENOUS at 20:00

## 2020-02-11 RX ADMIN — IOPAMIDOL 80 ML: 755 INJECTION, SOLUTION INTRAVENOUS at 20:27

## 2020-02-11 ASSESSMENT — ENCOUNTER SYMPTOMS
SHORTNESS OF BREATH: 0
WHEEZING: 0
DIARRHEA: 0
COUGH: 0
BACK PAIN: 1
SORE THROAT: 0
VOMITING: 0
NAUSEA: 0
RHINORRHEA: 0
ABDOMINAL PAIN: 0

## 2020-02-11 ASSESSMENT — PAIN SCALES - GENERAL
PAINLEVEL_OUTOF10: 8
PAINLEVEL_OUTOF10: 2

## 2020-02-11 ASSESSMENT — PAIN DESCRIPTION - LOCATION
LOCATION: LEG;HIP;ARM
LOCATION: BACK

## 2020-02-11 ASSESSMENT — PAIN DESCRIPTION - ONSET: ONSET: ON-GOING

## 2020-02-11 ASSESSMENT — PAIN DESCRIPTION - ORIENTATION
ORIENTATION: LOWER
ORIENTATION: LEFT;RIGHT

## 2020-02-11 ASSESSMENT — PAIN DESCRIPTION - PROGRESSION: CLINICAL_PROGRESSION: GRADUALLY IMPROVING

## 2020-02-11 ASSESSMENT — PAIN - FUNCTIONAL ASSESSMENT: PAIN_FUNCTIONAL_ASSESSMENT: ACTIVITIES ARE NOT PREVENTED

## 2020-02-11 ASSESSMENT — PAIN DESCRIPTION - PAIN TYPE
TYPE: ACUTE PAIN
TYPE: ACUTE PAIN;CHRONIC PAIN

## 2020-02-11 ASSESSMENT — PAIN DESCRIPTION - DESCRIPTORS: DESCRIPTORS: ACHING

## 2020-02-11 ASSESSMENT — PAIN DESCRIPTION - FREQUENCY: FREQUENCY: CONTINUOUS

## 2020-02-12 ENCOUNTER — APPOINTMENT (OUTPATIENT)
Dept: MRI IMAGING | Age: 55
DRG: 460 | End: 2020-02-12
Payer: COMMERCIAL

## 2020-02-12 PROBLEM — G83.4 CAUDA EQUINA COMPRESSION (HCC): Status: ACTIVE | Noted: 2020-02-12

## 2020-02-12 LAB
ANION GAP SERPL CALCULATED.3IONS-SCNC: 12 MEQ/L (ref 8–16)
BUN BLDV-MCNC: 4 MG/DL (ref 7–22)
CALCIUM SERPL-MCNC: 9.4 MG/DL (ref 8.5–10.5)
CHLORIDE BLD-SCNC: 101 MEQ/L (ref 98–111)
CO2: 22 MEQ/L (ref 23–33)
CREAT SERPL-MCNC: 0.5 MG/DL (ref 0.4–1.2)
ERYTHROCYTE [DISTWIDTH] IN BLOOD BY AUTOMATED COUNT: 14.4 % (ref 11.5–14.5)
ERYTHROCYTE [DISTWIDTH] IN BLOOD BY AUTOMATED COUNT: 49.3 FL (ref 35–45)
FERRITIN: 302 NG/ML (ref 10–291)
FOLATE: 9 NG/ML (ref 4.8–24.2)
GFR SERPL CREATININE-BSD FRML MDRD: > 90 ML/MIN/1.73M2
GLUCOSE BLD-MCNC: 224 MG/DL (ref 70–108)
HCT VFR BLD CALC: 35 % (ref 37–47)
HEMOGLOBIN: 10.7 GM/DL (ref 12–16)
IRON: 18 UG/DL (ref 50–170)
MCH RBC QN AUTO: 28.8 PG (ref 26–33)
MCHC RBC AUTO-ENTMCNC: 30.6 GM/DL (ref 32.2–35.5)
MCV RBC AUTO: 94.1 FL (ref 81–99)
OSMOLALITY CALCULATION: 274 MOSMOL/KG (ref 275–300)
PLATELET # BLD: 476 THOU/MM3 (ref 130–400)
PMV BLD AUTO: 10.3 FL (ref 9.4–12.4)
POTASSIUM SERPL-SCNC: 3.9 MEQ/L (ref 3.5–5.2)
PROCALCITONIN: 0.04 NG/ML (ref 0.01–0.09)
RBC # BLD: 3.72 MILL/MM3 (ref 4.2–5.4)
SODIUM BLD-SCNC: 135 MEQ/L (ref 135–145)
VITAMIN B-12: 649 PG/ML (ref 211–911)
WBC # BLD: 6.9 THOU/MM3 (ref 4.8–10.8)

## 2020-02-12 PROCEDURE — 80048 BASIC METABOLIC PNL TOTAL CA: CPT

## 2020-02-12 PROCEDURE — 36415 COLL VENOUS BLD VENIPUNCTURE: CPT

## 2020-02-12 PROCEDURE — 2580000003 HC RX 258: Performed by: INTERNAL MEDICINE

## 2020-02-12 PROCEDURE — 6370000000 HC RX 637 (ALT 250 FOR IP): Performed by: INTERNAL MEDICINE

## 2020-02-12 PROCEDURE — 6360000002 HC RX W HCPCS: Performed by: INTERNAL MEDICINE

## 2020-02-12 PROCEDURE — 6370000000 HC RX 637 (ALT 250 FOR IP): Performed by: FAMILY MEDICINE

## 2020-02-12 PROCEDURE — 99222 1ST HOSP IP/OBS MODERATE 55: CPT | Performed by: NEUROLOGICAL SURGERY

## 2020-02-12 PROCEDURE — 70551 MRI BRAIN STEM W/O DYE: CPT

## 2020-02-12 PROCEDURE — 99233 SBSQ HOSP IP/OBS HIGH 50: CPT | Performed by: FAMILY MEDICINE

## 2020-02-12 PROCEDURE — 96365 THER/PROPH/DIAG IV INF INIT: CPT

## 2020-02-12 PROCEDURE — 85027 COMPLETE CBC AUTOMATED: CPT

## 2020-02-12 PROCEDURE — 1200000003 HC TELEMETRY R&B

## 2020-02-12 PROCEDURE — 96367 TX/PROPH/DG ADDL SEQ IV INF: CPT

## 2020-02-12 PROCEDURE — 94760 N-INVAS EAR/PLS OXIMETRY 1: CPT

## 2020-02-12 PROCEDURE — 96372 THER/PROPH/DIAG INJ SC/IM: CPT

## 2020-02-12 PROCEDURE — 84145 PROCALCITONIN (PCT): CPT

## 2020-02-12 PROCEDURE — 72148 MRI LUMBAR SPINE W/O DYE: CPT

## 2020-02-12 RX ORDER — FOLIC ACID 1 MG/1
1 TABLET ORAL DAILY
Status: DISCONTINUED | OUTPATIENT
Start: 2020-02-12 | End: 2020-02-13

## 2020-02-12 RX ADMIN — ATORVASTATIN CALCIUM 20 MG: 20 TABLET, FILM COATED ORAL at 09:28

## 2020-02-12 RX ADMIN — SULFASALAZINE 1000 MG: 500 TABLET ORAL at 20:43

## 2020-02-12 RX ADMIN — ENOXAPARIN SODIUM 40 MG: 40 INJECTION SUBCUTANEOUS at 09:28

## 2020-02-12 RX ADMIN — SODIUM CHLORIDE: 9 INJECTION, SOLUTION INTRAVENOUS at 13:38

## 2020-02-12 RX ADMIN — Medication 10 ML: at 01:17

## 2020-02-12 RX ADMIN — CALCIUM CARBONATE-VITAMIN D TAB 500 MG-200 UNIT 1 TABLET: 500-200 TAB at 09:28

## 2020-02-12 RX ADMIN — CEFTRIAXONE SODIUM 1 G: 1 INJECTION, POWDER, FOR SOLUTION INTRAMUSCULAR; INTRAVENOUS at 23:59

## 2020-02-12 RX ADMIN — AZITHROMYCIN MONOHYDRATE 500 MG: 500 INJECTION, POWDER, LYOPHILIZED, FOR SOLUTION INTRAVENOUS at 01:44

## 2020-02-12 RX ADMIN — CEFTRIAXONE SODIUM 1 G: 1 INJECTION, POWDER, FOR SOLUTION INTRAMUSCULAR; INTRAVENOUS at 01:12

## 2020-02-12 RX ADMIN — FOLIC ACID 1 MG: 1 TABLET ORAL at 13:42

## 2020-02-12 RX ADMIN — ORPHENADRINE CITRATE 60 MG: 30 INJECTION INTRAMUSCULAR; INTRAVENOUS at 23:59

## 2020-02-12 RX ADMIN — SULFASALAZINE 1000 MG: 500 TABLET ORAL at 09:28

## 2020-02-12 RX ADMIN — SODIUM CHLORIDE: 9 INJECTION, SOLUTION INTRAVENOUS at 01:12

## 2020-02-12 RX ADMIN — ORPHENADRINE CITRATE 60 MG: 30 INJECTION INTRAMUSCULAR; INTRAVENOUS at 01:30

## 2020-02-12 RX ADMIN — PANTOPRAZOLE SODIUM 40 MG: 40 TABLET, DELAYED RELEASE ORAL at 05:20

## 2020-02-12 RX ADMIN — ORPHENADRINE CITRATE 60 MG: 30 INJECTION INTRAMUSCULAR; INTRAVENOUS at 13:42

## 2020-02-12 RX ADMIN — PREDNISONE 15 MG: 10 TABLET ORAL at 09:28

## 2020-02-12 RX ADMIN — Medication 10 ML: at 09:30

## 2020-02-12 ASSESSMENT — ENCOUNTER SYMPTOMS
BACK PAIN: 1
APNEA: 0
CHEST TIGHTNESS: 0
ABDOMINAL PAIN: 0
SHORTNESS OF BREATH: 0
ABDOMINAL DISTENTION: 0

## 2020-02-12 ASSESSMENT — PAIN SCALES - GENERAL
PAINLEVEL_OUTOF10: 0

## 2020-02-12 NOTE — ED NOTES
Pt in CT scan at this time. Pt vital signs stable and respirations unlabored.       Mane Chong RN  02/11/20 2024

## 2020-02-12 NOTE — H&P
History & Physical        Patient:  Hilary Spear  YOB: 1965    MRN: [id number]     Acct: [de-identified]    PCP: FABIAN Blankenship CNP    Date of Admission: 2/11/2020    Date of Service: Pt seen/examined on 2/11/2020   and Admitted to observation with expected LOS less than two midnights due to medical therapy. Chief Complaint:   Chief Complaint   Patient presents with    Fatigue    Extremity Weakness       History Of Present Illness:      62 y.o. female who presented to [address] [place] [address] with complaints of lower extremity weakness. Thighs are reported to be numb and painful. Symptoms located bilateral anterior thighs. Patient endorses lumbar pain and is having difficulty ambulatin x 2-3 weeks. She denies injury. She reports history of lumbar fusion in the past. She notes that she is not having any bowel or bladder incontinence. She denies chest pain or shortness of breath. Denies nausea, vomiting, diarrhea or constipation. Denies fevers. Endorses chills. Patient has history of RA and is on steroids at home. Past Medical History:          Diagnosis Date    Arthritis pain     Blood circulation, collateral     both arms--related to RA    Difficulty in swallowing 2013    DVT (deep venous thrombosis) (HCC)     Esophageal dysmotility 5/10/2013    Essential hypertension 4/23/2018    GERD (gastroesophageal reflux disease)     Hemorrhoids     Osteoarthritis     Osteoarthritis     Pneumonia     Rheumatoid arthritis ([facility] [address] [address]     Tobacco abuse 4/20/2013       Past Surgical History:          Procedure Laterality Date    BACK SURGERY  1999 ?  ECTOPIC PREGNANCY SURGERY      ENDOMETRIAL ABLATION      OTHER SURGICAL HISTORY Right 05/29/2013    Robotic R Paraspinal mass resection    TUBAL LIGATION      WRIST FUSION      rt    WRIST SURGERY      lt       Medications Prior to Admission:      Prior to Admission medications    Medication Sig Start Date End Date Taking? Authorizing Provider   atorvastatin (LIPITOR) 20 MG tablet TAKE 1 TABLET BY MOUTH EVERY DAY 1/30/20   FABIAN Golden CNP   diclofenac sodium 1 % GEL Apply 2 g topically 4 times daily as needed for Pain Apply to right side neck. 1/6/20   FABIAN Golden CNP   traMADol (ULTRAM) 50 MG tablet TAKE 1 TABLET BY MOUTH EVERY 4 HOURS AS NEEDED FOR PAIN 10/1/19   Historical Provider, MD   Naproxen Sodium (ALEVE PO) Take by mouth    Historical Provider, MD   cyclobenzaprine (FLEXERIL) 10 MG tablet Take 10 mg by mouth 3 times daily as needed for Muscle spasms    Historical Provider, MD   losartan-hydrochlorothiazide (HYZAAR) 50-12.5 MG per tablet Take 1 tablet by mouth daily 9/16/19   FABIAN Golden CNP   metFORMIN (GLUCOPHAGE) 850 MG tablet Take 1 tablet by mouth daily (with breakfast) 7/23/19   FABIAN Golden CNP   Calcium Carb-Cholecalciferol (CALCIUM 600+D) 600-800 MG-UNIT TABS     Historical Provider, MD   Golimumab ([address] ARIA IV) Infuse intravenously    Historical Provider, MD   predniSONE (DELTASONE) 10 MG tablet Take 15 mg by mouth daily Take 9 mg daily (1/2 of 10mg tablet PLUS 1mg tablet)    Jeimy Olvera MD   sulfaSALAzine (AZULFIDINE) 500 MG tablet Take 1,000 mg by mouth 2 times daily     Jeimy Olvera MD   methotrexate (RHEUMATREX) 2.5 MG chemo tablet Take 17.5 mg by mouth every 7 days     Jeimy Olvera MD   omeprazole (PRILOSEC) 20 MG capsule Take 20 mg by mouth daily. Indications: Nonerosive GERD    Jeimy Olvera MD   alendronate (FOSAMAX) 70 MG tablet Take 70 mg by mouth every 7 days. Indications: Osteoporosis    Jeimy Olvera MD       Allergies:  Remicade [infliximab]    Social History:      The patient currently lives at home    TOBACCO:   reports that she quit smoking about 6 years ago. Her smoking use included cigarettes. She has a 7.50 pack-year smoking history.  She has never used smokeless tobacco.  ETOH:   reports current alcohol use. Family History:      Reviewed in detail and negative for CVA. Positive as follows:        Problem Relation Age of Onset    Arthritis Mother     Heart Disease Mother     Cancer Father     High Blood Pressure Father     High Blood Pressure Brother     Diabetes Maternal Aunt     Arthritis Maternal Uncle     Arthritis Sister        Diet:  DIET LOW SODIUM 2 GM;    REVIEW OF SYSTEMS:   Pertinent positives as noted in the HPI. All other systems reviewed and negative. PHYSICAL EXAM:    /69   Pulse 97   Temp 98.7 °F (37.1 °C) (Oral)   Resp 20   Ht 5' 7\" (1.702 m)   Wt 170 lb (77.1 kg)   SpO2 100%   BMI 26.63 kg/m²     General appearance:  Noted apparent distress, appears stated age and cooperative. HEENT:  Normal cephalic, atraumatic without obvious deformity. Pupils equal, round, and reactive to light. Extra ocular muscles intact. Conjunctivae/corneas clear. Neck: Supple, with full range of motion. No jugular venous distention. Trachea midline. Respiratory:  Normal respiratory effort. Clear to auscultation, bilaterally without Rales/Wheezes/Rhonchi. Cardiovascular:  Regular rate and rhythm with normal S1/S2 without murmurs, rubs or gallops. Abdomen: Soft, non-tender, non-distended with normal bowel sounds. Musculoskeletal:  L5-S1 point tenderness. Decreased straight leg raise due to pain. Numbness/tingling/pain bilateral anterior thighs. Right upper extremity deformity. Brace left wrist.  Skin: Skin color, texture, turgor normal.  No rashes or lesions. Neurologic:  Neurovascularly intact without any focal sensory/motor deficits.  Cranial nerves: II-XII intact, grossly non-focal.  Psychiatric:  Alert and oriented, thought content appropriate, normal insight  Capillary Refill: Brisk,< 3 seconds   Peripheral Pulses: +2 palpable, equal bilaterally       Labs:     Recent Labs     02/11/20 1925   WBC 7.0   HGB 11.3*   HCT 36.4*   *     Recent Labs 02/11/20 1925      K 3.5   CL 98   CO2 21*   BUN 5*   CREATININE 0.5   CALCIUM 9.6     Recent Labs     02/11/20 1925   AST 19   ALT 10*   BILIDIR <0.2   BILITOT 0.5   ALKPHOS 75     Recent Labs     02/11/20 1925   INR 1.23*     Recent Labs     02/11/20 1925   CKTOTAL 212*       Urinalysis:      Lab Results   Component Value Date    NITRU NEGATIVE 02/11/2020    WBCUA 0-2 02/11/2020    BACTERIA NONE SEEN 02/11/2020    RBCUA 3-5 02/11/2020    BLOODU NEGATIVE 02/11/2020    SPECGRAV 1.029 05/24/2013    GLUCOSEU NEGATIVE 02/11/2020       Radiology:         CT ABDOMEN PELVIS W IV CONTRAST Additional Contrast? None   Final Result   No acute traumatic abnormality of the solid or hollow viscera of the abdomen and pelvis. No acute inflammatory or infectious process in the abdomen or pelvis. Colonic diverticulosis without evidence of diverticulitis. No evidence of bowel obstruction. Mild bilateral external iliac and inguinal probably reactive adenopathy. Postoperative changes of the lumbar spine, see above. Bibasilar interstitial infiltrates, acute versus chronic, see above. **This report has been created using voice recognition software. It may contain minor errors which are inherent in voice recognition technology. **      Final report electronically signed by Dr. Maddie Marx on 2/11/2020 9:04 PM      CT LUMBAR RECONSTRUCTION WO POST PROCESS   Final Result   Status post L4-5 and L5-S1 discectomies with disc prostheses and pedicle screw fusion and L4 and L5 laminectomies. No fracture or subluxation. Moderate L3-4 degenerative disc disease. L3-4 possible annular bulge and moderate spinal stenosis with probable impingement upon the right L3 nerve root. See the accompanying abdomen pelvis CT report for discussion of nonspine findings. **This report has been created using voice recognition software. It may contain minor errors which are inherent in voice recognition technology. ** Final report electronically signed by Dr. Roger Curry on 2/11/2020 8:56 PM      CT HEAD WO CONTRAST   Final Result      No acute ischemic infarct, hemorrhage, or mass effect. **This report has been created using voice recognition software. It may contain minor errors which are inherent in voice recognition technology. **      Final report electronically signed by Dr. Roger Curry on 2/11/2020 8:50 PM      XR CHEST PORTABLE   Final Result      No acute cardiopulmonary disease. **This report has been created using voice recognition software. It may contain minor errors which are inherent in voice recognition technology. **      Final report electronically signed by Dr. Roger Curry on 2/11/2020 7:52 PM      MRI BRAIN WO CONTRAST    (Results Pending)   MRI LUMBAR SPINE WO CONTRAST    (Results Pending)            DVT prophylaxis: [x] Lovenox                                 [] SCDs                                 [] SQ Heparin                                 [] Encourage ambulation           [] Already on Anticoagulation    Code Status: Full Code      PT/OT Eval Status: Encouraged    Disposition:    [] Home       [] TCU       [x] Rehab, likely       [] Psych       [] SNF       [] [place]       [] Other-    ASSESSMENT:    Active Hospital Problems    Diagnosis Date Noted    Weakness [R53.1] 02/11/2020     Priority: High    Anterior thigh numbness [R20.0] 02/11/2020     Priority: Medium    Intractable back pain [M54.9] 02/11/2020     Priority: Medium    Decreased activities of daily living (ADL) [Z78.9] 02/11/2020     Priority: Low    Anemia [D64.9] 02/11/2020    Elevated C-reactive protein (CRP) [R79.82] 02/11/2020    Elevated sed rate [R70.0] 02/11/2020    Lung infiltrate [R91.8] 02/11/2020    Lumbar nerve root impingement [M54.16] 02/11/2020    Essential hypertension [I10] 04/23/2018    Back pain, chronic [M54.9, G89.29] 12/12/2013    GERD (gastroesophageal reflux disease) [K21.9] 04/19/2013    History of rheumatoid arthritis [Z87.39] 04/19/2013       PLAN:    Admit to Telemetry Unit  Diet regular  IVF hydration as tolerated  Analgesics PRN  Antiemetics PRN  DVT prophylaxis  PT/OT encouraged, if warranted  IS  Neurosurgery consult, apprec chart recs  Empiric antibiotics, cultures pending  Will need to re-evaluate home medications in morning  Continue to monitor closely     Thank you FABIAN Hammer CNP for the opportunity to be involved in this patient's care.     Electronically signed by Felipa Camejo DO on 2/11/2020 at 11:00 PM

## 2020-02-12 NOTE — ED NOTES
ED to inpatient nurses report    Chief Complaint   Patient presents with    Fatigue    Extremity Weakness      Present to ED from home  LOC: alert and orientated to name, place, date  Vital signs   Vitals:    02/11/20 1932 02/11/20 2045 02/11/20 2132 02/11/20 2207   BP: 136/75  121/63 133/69   Pulse: 98 100 97 97   Resp: 16 22 18 20   Temp:       TempSrc:       SpO2: 98% 100% 100% 100%   Weight:       Height:          Oxygen Baseline room air    Current needs required room air  LDAs:   Peripheral IV 02/11/20 Left (Active)   Site Assessment Clean;Dry; Intact 2/11/2020  7:27 PM   Line Status Specimen collected 2/11/2020  7:27 PM   Dressing Status Clean;Dry; Intact 2/11/2020  7:27 PM   Dressing Intervention New 2/11/2020  7:27 PM     Mobility: Requires assistance * 2 has been unable to walk and basically bed bound since 1/27. Since the 1/27 Pt has gotten up to restroom and that is it. Pending ED orders: none  Present condition: Pt vital signs stable. Pt has pain 8/10 but gets intermittent sharp pinching pains in right hip every once in awhile.      Electronically signed by Ida Schulz RN on 2/11/2020 at 10:08 PM           Ida Schulz RN  02/11/20 9950

## 2020-02-12 NOTE — CONSULTS
Consults         Attending Note:    Patient seen and examined in conjunction with neurosurgery JEREMIAH Rodarte PA-C). Discussed with patient, her  and her nurse  as well. All data and imaging reviewed by myself. I agree with examination assessment and plan as documented below. Please See my additional comments below for updated orders and plan. -This is a 78-year-old female who was admitted yesterday because of a progressive history of severe low back pain goes to her left leg and associated with progressive weakness of the left leg. Patient reported numbness in her left leg but there is no numbness in the genital area or sphincter dysfunction at this time.  -Past medical history was significant for 2 spine surgeries and lumbar spine instrumentation and fusion.  -Patient neurological exam was significant for proximal weakness and patient left leg (quadriceps about 2+/5) and 4/5 in the plantar and dorsal foot flexion. -A positive left leg sign in the left with decreased sensation in her left leg comparing to the right. - Lumbar spine MRI showed:    Severe spinal canal stenosis with compression of the cauda equina at the L3-4 level due to loss of disc height and a large central/left central disc protrusion which migrates superiorly. There are also facet degenerative changes at this level. There    is moderate severity bilateral foraminal stenosis       - Today, I had long discussion with patient regarding her symptoms and the finding of her L-spine MRI.   I told her that based on the radiological findings of her L-spine MRI, as well as, her current symptoms and her neurological exam findings, my recommendation for her is a surgical intervention in the form of \"lumbar spine posterior decompressive laminectomy, and surgical resection of herniated disc the level of L3-L4, possible revision of previous posterior instrumentation from L4-S1 + extension of previous instrumentation and fusion to L3).    - I discussed this recommended surgical intervention with patient  And her  in detail including the associated risks and benefits, as well as, the alternative treatment options. - I discussed with patient the possible complications of surgery ( in front our neurosurgery PA, Ladonna Whiting and pateint's nurse) in detail  including excessive blood loss requiring transfusion, infection that may become meningitis, problem with heart lung and kidney as a result of general anesthesia such as heart attack, pneumonia, kidney shutdown and stroke. I also discussed the possible complication of the operations in and around the spine such as, death, paralysis, weakness on one side or the other of the body, decreased sensation or pain on one side or the other of the body, inability to control bowel/bladder, problems with sexual function, postoperative meningitis, postoperative development of a blood clot that may require another operation, leakage of fluid from the wound that may require another operation. The unlikely event of blindness following surgery in the prone position was also discussed with the patient. The patient understands that no guarantee can be made regarding the extent of post-operative pain relief or other symptoms.  - All questions were answered. - The patient elected to proceed with the recommended surgical intervention and signed the surgical consent. - The plan now for Eric López is to schedule her for surgery tomorrow morning.   - NPO after the midnight. Venkata Hernandez MD     *Time spent with the patient was 70 minutes. More than 50% was spent counseling/coordinating the patient's care.                                                Magdaleno Ruvalcaba [address] [place] [address]                                          NEUROSURGICAL CONSULTATION NOTE       Joey Gomez   YOB: 1965  Account Number: [de-identified]   Date of Examination: 2/12/2020    ASSESSMENT:  Adjacent level disease noted above her prior lumbar fusion with a significant disc herniation at the lumbar 3 4 level as evidenced on MRI. PLAN:  Is seen and examined along with Dr. Melecio Cordero. With the recent onset and presentation of symptoms along with findings on exam and on imaging neurosurgery recommends urgent surgical intervention in the form of a revision of her prior lumbar fusion with a decompression of the lumbar 3 4 area and concurrent extension of the fusion to lumbar 3. We have discussed the procedure in detail along with expectations for recovery and the associated risks which include, but are not limited to; bleeding, infection, anesthetic complications, neurologic injury, incomplete relief of symptoms, CSF leak, the need for future fusion, and even death. We also discussed the patient's past history of lumbar fusion with Dr. nora ramirez and have offered Dr. Hamzah Gann as an option for the surgery. A consent is been offered for her signature and if she chooses to move forward with our service we will schedule the surgery at the earliest convenience. HISTORY OF PRESENT ILLNESS:  Chyna Duong is a pleasant, normally active 62 y.o. female, former smoker tobacco with a quit date of 5/21/2013 following 1/2 pack day 7-1/2-year history who admits to occasional alcohol use and has a medical history significant for rheumatoid arthritis, pneumonia, osteoarthritis, GERD, essential hypertension, esophageal dysmotility, DVT, arthritis and chronic back pain with a surgical history significant for prior lumbar fusion performed by Dr. Hamzah Gann. She was admitted through the emergency department complaints of fatigue and lower extremity weakness secondary to back pain on :2/11/2020  7:06 PM.  She states that her pain which is described as transient and shooting involves the legs bilaterally. She has diffuse lower extremity weakness most significant with quadricep weakness bilaterally and a left worse than right distribution. On physical exam she demonstrates 3/5 strength for quadriceps on the left with 4/5 elicited on the right along with numbness and tingling involving the thighs bilaterally with radiation to the hips. Patient no weakness elicited for dorsi and plantar flexion bilaterally. Straight leg raise was positive on the left. Denies any change in bowel or bladder habits and denies any saddle anesthesia. An MRI of the lumbar spine imaged today reveals severe spinal stenosis with compression of the cauda equina at the lumbar 3 4 level secondary to a left central disc protrusion which migrates superiorly. This is suggestive of adjacent level disease above the level of her prior lumbar fusion. ROS:    Review of Systems   Constitutional: Positive for activity change. Respiratory: Negative for apnea, chest tightness and shortness of breath. Cardiovascular: Negative for chest pain and leg swelling. Gastrointestinal: Negative for abdominal distention and abdominal pain. Genitourinary: Negative for difficulty urinating. Musculoskeletal: Positive for back pain and gait problem. Negative for neck pain and neck stiffness. Neurological: Positive for weakness and numbness. Negative for dizziness, facial asymmetry and headaches. Psychiatric/Behavioral: Negative for agitation, behavioral problems, confusion and decreased concentration.        PROBLEM LIST:  Patient Active Problem List   Diagnosis    Uvulitis    Leukocytosis    Hypokalemia    History of rheumatoid arthritis    GERD (gastroesophageal reflux disease)    Chronic use of steroids    Dysphagia    Tobacco abuse    Esophageal dysmotility    Back pain, chronic    Essential hypertension    Rheumatoid arthritis (HCC)    Weakness    Anterior thigh numbness    Intractable back pain    Decreased activities of daily living (ADL)    Anemia    Elevated C-reactive protein (CRP)    Elevated sed rate    Lung infiltrate    Lumbar nerve root impingement  Cauda equina compression (HCC)       MEDICATIONS:   Prior to Admission medications    Medication Sig Start Date End Date Taking? Authorizing Provider   atorvastatin (LIPITOR) 20 MG tablet TAKE 1 TABLET BY MOUTH EVERY DAY 1/30/20  Yes FABIAN Ordonez CNP   diclofenac sodium 1 % GEL Apply 2 g topically 4 times daily as needed for Pain Apply to right side neck. 1/6/20  Yes FABIAN Ordonez CNP   traMADol (ULTRAM) 50 MG tablet TAKE 1 TABLET BY MOUTH EVERY 4 HOURS AS NEEDED FOR PAIN 10/1/19  Yes Historical Provider, MD   cyclobenzaprine (FLEXERIL) 10 MG tablet Take 10 mg by mouth 3 times daily as needed for Muscle spasms   Yes Historical Provider, MD   metFORMIN (GLUCOPHAGE) 850 MG tablet Take 1 tablet by mouth daily (with breakfast) 7/23/19  Yes FABIAN Ordonez CNP   Calcium Carb-Cholecalciferol (CALCIUM 600+D) 600-800 MG-UNIT TABS    Yes Historical Provider, MD   Golimumab ([address] IV) Infuse intravenously   Yes Historical Provider, MD   sulfaSALAzine (AZULFIDINE) 500 MG tablet Take 500 mg by mouth daily Indications: Rheumatoid Arthritis    Yes Perfecto Campbell MD   methotrexate (RHEUMATREX) 2.5 MG chemo tablet Take 17.5 mg by mouth every 7 days    Yes Perfecto Campbell MD   omeprazole (PRILOSEC) 20 MG capsule Take 20 mg by mouth every other day Indications: Nonerosive GERD    Yes Perfecto Campbell MD   alendronate (FOSAMAX) 70 MG tablet Take 70 mg by mouth every 7 days.  Indications: Osteoporosis   Yes Perfecto Campbell MD   Naproxen Sodium (ALEVE PO) Take by mouth    Historical Provider, MD   losartan-hydrochlorothiazide [facility]) 50-12.5 MG per tablet Take 1 tablet by mouth daily 9/16/19   FABIAN Ordonez CNP       Current Facility-Administered Medications   Medication Dose Route Frequency Provider Last Rate Last Dose    sodium chloride flush 0.9 % injection 10 mL  10 mL Intravenous 2 times per day Magaly Hair, DO   10 mL at 02/12/20 0117    sodium chloride flush 0.9 % injection 10 mL  10 mL Intravenous PRN Adolfo Chapa, DO        magnesium hydroxide (MILK OF MAGNESIA) 400 MG/5ML suspension 30 mL  30 mL Oral Daily PRN Adolfo Chapa, DO        ondansetron [facility]) injection 4 mg  4 mg Intravenous Q6H PRN Adolfo Acuñat, DO        enoxaparin (LOVENOX) injection 40 mg  40 mg Subcutaneous Daily Adolfo Chapa, DO        acetaminophen (TYLENOL) tablet 650 mg  650 mg Oral Q4H PRN Adolfo Chapa, DO        HYDROcodone-acetaminophen (NORCO) 7.5-325 MG per tablet 1 tablet  1 tablet Oral Q6H PRN Adolfo Chapa, DO        lidocaine 4 % external patch 1 patch  1 patch Transdermal Daily Adolfo Chapa, DO        orphenadrine (NORFLEX) injection 60 mg  60 mg Intramuscular Q12H Adolfo Chapa, DO   60 mg at 02/12/20 0130    0.9 % sodium chloride infusion   Intravenous Continuous Adolfo Chapa,  mL/hr at 02/12/20 0112      atorvastatin (LIPITOR) tablet 20 mg  20 mg Oral Daily Adolfo Chapa, DO        calcium-vitamin D (OSCAL-500) 500-200 MG-UNIT per tablet 1 tablet  1 tablet Oral Daily Adolfo Chapa,         pantoprazole (PROTONIX) tablet 40 mg  40 mg Oral QAM AC Adolfo Chapa DO   40 mg at 02/12/20 1818    sulfaSALAzine (AZULFIDINE) tablet 1,000 mg  1,000 mg Oral BID Adolfo Chapa, DO        predniSONE (DELTASONE) tablet 15 mg  15 mg Oral Daily Adolfo Chapa, DO        azithromycin (ZITHROMAX) 500 mg in D5W 250ml addavial  500 mg Intravenous Q24H Adolfo Chapa, DO   Stopped at 02/12/20 0322    cefTRIAXone (ROCEPHIN) 1 g IVPB in 50 mL D5W minibag  1 g Intravenous Q24H Adolfo Chapa, DO   Stopped at 02/12/20 0153        sodium chloride flush, 10 mL, PRN  magnesium hydroxide, 30 mL, Daily PRN  ondansetron, 4 mg, Q6H PRN  acetaminophen, 650 mg, Q4H PRN  HYDROcodone-acetaminophen, 1 tablet, Q6H PRN         sodium chloride 100 mL/hr at 02/12/20 0112         ALLERGIES:   Remicade [infliximab]    PAST MEDICAL  HISTORY:    has a Abdominal:      General: Abdomen is flat. Bowel sounds are normal. There is no distension. Tenderness: There is no abdominal tenderness. Musculoskeletal: Normal range of motion. General: Tenderness present. No deformity. Skin:     General: Skin is warm and dry. Neurological:      General: No focal deficit present. Mental Status: She is alert and oriented to person, place, and time. Sensory: Sensory deficit present. Motor: Weakness present. Gait: Gait abnormal.      Comments: Patient demonstrates significant weakness for quadriceps bilaterally left worse than right. Left quadriceps at 3/5 right quadriceps at 4/5 with numbness and tingling in the thighs bilaterally involving the hips. Patient denies any saddle anesthesia. Does relate transient pain which is described as shooting. Spinal weakness for plantarflexion bilaterally at 4/5. Positive straight leg raise elicited on the left. Psychiatric:         Mood and Affect: Mood normal.         Behavior: Behavior normal.         Thought Content:  Thought content normal.         Judgment: Judgment normal.           Cynthia May PA-C  Electronically signed 2/12/2020 at 0900

## 2020-02-12 NOTE — FLOWSHEET NOTE
02/12/20 0534   Provider Notification   Reason for Communication Evaluate   Provider Name Dr. Avni Ocampo   Provider Notification Physician   Method of Communication Secure Message   Response No new orders   Notification Time [phone]     Consult for back pain, leg weakness, and history of lumbar fusion.

## 2020-02-12 NOTE — PROGRESS NOTES
infiltrates: CT abdomen/pelvis documented this finding, patient started on ceftriaxone and azithromycin for assumed CAP. Although patient remained afebrile, no signs of infection, no leukocytosis, we will check procalcitonin level, if less than 0.05 then discontinue IV antibiotic. PMH, PSH, SH, FHX reviewed in chart review snap shot in EPIC    CC: Severe spinal lumbar cannot stenosis  HPI: Initial HPI reviewed as below:  62 y.o. female who presented to [facility] with complaints of lower extremity weakness. Thighs are reported to be numb and painful. Symptoms located bilateral anterior thighs. Patient endorses lumbar pain and is having difficulty ambulatin x 2-3 weeks. She denies injury. She reports history of lumbar fusion in the past. She notes that she is not having any bowel or bladder incontinence. She denies chest pain or shortness of breath. Denies nausea, vomiting, diarrhea or constipation. Denies fevers. Endorses chills. Patient has history of RA and is on steroids at home. For further details and updates please refer to assessment and plan at the beginning of the note. ROS (10 point review of systems completed. Pertinent positives noted. Otherwise ROS is negative) :  Lower back pain, saddle paresthesia  PMH:  Per HPI and reviewed in the chart  SHX: Reviewed in the chart, also the patient  FHX: Reviewed in the chart, also with the patient  Allergies: Reviewed in the chart  Medications:     sodium chloride 100 mL/hr at 02/12/20 0112      sodium chloride flush  10 mL Intravenous 2 times per day    enoxaparin  40 mg Subcutaneous Daily    lidocaine  1 patch Transdermal Daily    orphenadrine  60 mg Intramuscular Q12H    atorvastatin  20 mg Oral Daily    calcium-vitamin D  1 tablet Oral Daily    pantoprazole  40 mg Oral QAM AC    sulfaSALAzine  1,000 mg Oral BID    predniSONE  15 mg Oral Daily    azithromycin  500 mg Intravenous Q24H    cefTRIAXone (ROCEPHIN) IV  1 g 10*   BILIDIR <0.2   BILITOT 0.5   ALKPHOS 75     Recent Labs     02/11/20 1925   INR 1.23*     Recent Labs     02/11/20 1925   CKTOTAL 212*       Microbiology:    Blood culture #1: No results found for: [facility]    Blood culture #2:No results found for: Georgie Gomez    Organism:No results found for: [facility]      Lab Results   Component Value Date    LABGRAM  09/16/2017     Many segmented neutrophils observed. No epithelial cells observed. No bacteria seen. MRSA culture only:No results found for: [facility]    Urine culture: No results found for: LABURIN    Respiratory culture: No results found for: CULTRESP    Aerobic and Anaerobic :  No results found for: LABAERO  Lab Results   Component Value Date    LABANAE No growth-preliminary  No growth   09/16/2017       Urinalysis:      Lab Results   Component Value Date    NITRU NEGATIVE 02/11/2020    WBCUA 0-2 02/11/2020    BACTERIA NONE SEEN 02/11/2020    RBCUA 3-5 02/11/2020    BLOODU NEGATIVE 02/11/2020    SPECGRAV 1.029 05/24/2013    GLUCOSEU NEGATIVE 02/11/2020       Radiology:  CT ABDOMEN PELVIS W IV CONTRAST Additional Contrast? None   Final Result   No acute traumatic abnormality of the solid or hollow viscera of the abdomen and pelvis. No acute inflammatory or infectious process in the abdomen or pelvis. Colonic diverticulosis without evidence of diverticulitis. No evidence of bowel obstruction. Mild bilateral external iliac and inguinal probably reactive adenopathy. Postoperative changes of the lumbar spine, see above. Bibasilar interstitial infiltrates, acute versus chronic, see above. **This report has been created using voice recognition software. It may contain minor errors which are inherent in voice recognition technology. **      Final report electronically signed by Dr. Stefan Blackmon on 2/11/2020 9:04 PM      CT LUMBAR RECONSTRUCTION WO POST PROCESS   Final Result   Status post L4-5 and L5-S1 discectomies with disc prostheses and pedicle screw fusion and L4 and L5 laminectomies. No fracture or subluxation. Moderate L3-4 degenerative disc disease. L3-4 possible annular bulge and moderate spinal stenosis with probable impingement upon the right L3 nerve root. See the accompanying abdomen pelvis CT report for discussion of nonspine findings. **This report has been created using voice recognition software. It may contain minor errors which are inherent in voice recognition technology. **         Final report electronically signed by Dr. Roger Curry on 2/11/2020 8:56 PM      CT HEAD WO CONTRAST   Final Result      No acute ischemic infarct, hemorrhage, or mass effect. **This report has been created using voice recognition software. It may contain minor errors which are inherent in voice recognition technology. **      Final report electronically signed by Dr. Roger Curry on 2/11/2020 8:50 PM      XR CHEST PORTABLE   Final Result      No acute cardiopulmonary disease. **This report has been created using voice recognition software. It may contain minor errors which are inherent in voice recognition technology. **      Final report electronically signed by Dr. Roger Curry on 2/11/2020 7:52 PM      MRI BRAIN WO CONTRAST    (Results Pending)   MRI LUMBAR SPINE WO CONTRAST    (Results Pending)     Ct Head Wo Contrast    Result Date: 2/11/2020  PROCEDURE: CT HEAD WO CONTRAST CLINICAL INFORMATION: weakness. COMPARISON: Noncontrast brain CT dated 8/10/2019 TECHNIQUE: Noncontrast 5 mm axial images were obtained through the brain. Sagittal and coronal reconstructions were obtained and reviewed. All CT scans at this facility use dose modulation, iterative reconstruction, and/or weight-based dosing when appropriate to reduce radiation dose to as low as reasonably achievable. FINDINGS: Brain: There is no acute ischemic infarct, hemorrhage, midline shift, mass, or mass effect.  There is no focal abnormality of brain pancreatitis. Adrenal glands: Unremarkable. No mass. Kidneys and ureters: There is an 8 mm probable cyst of the upper pole of the left kidney. The kidneys are otherwise unremarkable. . No hydroureteronephrosis. No renal or ureteral calculi. No findings to suggest acute pyelonephritis. Stomach, small bowel, and colon: Stomach and duodenum are unremarkable. There is colonic diverticulosis without diverticulitis. Small bowel and colon are otherwise unremarkable. There is no evidence of bowel wall thickening. No evidence of bowel obstruction. Appendix: The appendix is not visualized however there are no findings to suggest acute appendicitis. Omentum and mesentery: Unremarkable Aorta, vascular: No aortic aneurysm or dissection. There are aortoiliofemoral atherosclerotic calcifications. Reproductive: Unremarkable Bladder: Unremarkable. No wall thickening or obvious mass. No calcified stones. Intraperitoneal/retroperitoneal Space: There is no ascites, abscess, or mass. No pneumoperitoneum. There is mild bilateral external iliac adenopathy, likely reactive. Abdominal and pelvic body wall soft tissues: There is mild bilateral inguinal adenopathy, likely reactive. Musculoskeletal structures: Patient is status post L4 and L5 laminectomies, L4-5 and L5-S1 discectomies with bilateral L4-S1 pedicle screw fusion. There is a defect in the posterior medial left ilium likely representing a bone graft donor site. No acute traumatic abnormality of the solid or hollow viscera of the abdomen and pelvis. No acute inflammatory or infectious process in the abdomen or pelvis. Colonic diverticulosis without evidence of diverticulitis. No evidence of bowel obstruction. Mild bilateral external iliac and inguinal probably reactive adenopathy. Postoperative changes of the lumbar spine, see above. Bibasilar interstitial infiltrates, acute versus chronic, see above. **This report has been created using voice recognition software.  It may contain minor errors which are inherent in voice recognition technology. ** Final report electronically signed by Dr. Constance Styles on 2/11/2020 9:04 PM    Xr Chest Portable    Result Date: 2/11/2020  PROCEDURE: XR CHEST PORTABLE CLINICAL INFORMATION: fatigue. COMPARISON: Chest x-ray dated 12/9/2013 TECHNIQUE: AP Portable chest xray FINDINGS: Lines/tubes/devices: none Lungs/pleura:  No pneumonia, pulmonary edema, or obvious mass. No pleural effusion. No pneumothorax. Heart: Heart size is normal. Mediastinum/suzan: No obvious mass or adenopathy. Skeleton: No significant bone or joint abnormality. No acute cardiopulmonary disease. **This report has been created using voice recognition software. It may contain minor errors which are inherent in voice recognition technology. ** Final report electronically signed by Dr. Constance Styles on 2/11/2020 7:52 PM    Ct Lumbar Reconstruction Wo Post Process    Result Date: 2/11/2020  PROCEDURE: CT LUMBAR RECONSTRUCTION WO POST PROCESS CLINICAL INFORMATION: Trauma. COMPARISON: Lumbar spine MRI dated 12/31/2014 and the lumbar spine x-rays dated 10/20/2014 TECHNIQUE: 3 mm axial CT images were reconstructed through the lumbar spine. These are reconstructed from the patient's abdomen and pelvis CT. IV contrast is present. Sagittal and coronal reconstructions were obtained. All CT scans at this facility use dose modulation, iterative reconstruction, and/or weight-based dosing when appropriate to reduce radiation dose to as low as reasonably achievable. FINDINGS: There are 5  non-rib-bearing lumbar vertebrae. The patient is status post L4-5 and L5-S1 discectomies with disc prostheses and bilateral L4-S1 pedicle screw fusion. Patient also status post L4 and L5 laminectomies. There is no fracture. There is no subluxation. There is moderate L3-4 disc space narrowing consistent with degenerative disc disease. There is mild multilevel endplate spondylosis from L3 through S1.  The paravertebral

## 2020-02-12 NOTE — ED NOTES
Rodrigo Tripp CNP at bedside at this time testing reflexes of pt.       Patty Fleischer, CATRACHITA  02/11/20 0255

## 2020-02-12 NOTE — PROGRESS NOTES
Nutrition Assessment    Type and Reason for Visit: Initial, Positive Nutrition Screen(Poor appetite, weight loss)    Nutrition Recommendations: Added CHO controlled diet to diet order per pt. Request.  Will send Ensure High Protein BID. Encouraged good nutrition at best efforts. Ordered daily weights to monitor trends. Nutrition Assessment:   Pt. nutritionally compromised AEB decreased appetite and intake for a few weeks pta. At risk for further nutrition compromise r/t pain at times, upcoming surgery (laminectomy) and underlying medical condition (hx RA, hyperglycemia with steroids). Nutrition recommendations/interventions as per above. Malnutrition Assessment:  · Malnutrition Status: At risk for malnutrition  · Context: Acute illness or injury  · Findings of the 6 clinical characteristics of malnutrition (Minimum of 2 out of 6 clinical characteristics is required to make the diagnosis of moderate or severe Protein Calorie Malnutrition based on AND/ASPEN Guidelines):  1. Energy Intake-Less than or equal to 75% of estimated energy requirement, Greater than or equal to 7 days    2. Weight Loss-No significant weight loss,    3. Fat Loss-No significant subcutaneous fat loss,    4. Muscle Loss-No significant muscle mass loss,    5. Fluid Accumulation-No significant fluid accumulation,    6.  Strength-Not measured    Nutrition Risk Level:  Moderate    Nutrient Needs:  · Estimated Daily Total Kcal: 3475-2739 kcals (20-25 kcals/kgm wt of 86 kgm)  · Estimated Daily Protein (g): 79+ grams/day (1.3+ grams/kgm IBW)    Nutrition Diagnosis:   · Problem: Inadequate oral intake  · Etiology: related to (pain at times)     Signs and symptoms:  as evidenced by Diet history of poor intake    Objective Information:  · Nutrition-Focused Physical Findings: denies any trouble chewing/swallowing; 7/19: HgbA1c 6.2%; 2/12: Glucose 224; Rx includes Deltasone; on Glucophage pta; no formal hx DM - but on steroids for

## 2020-02-12 NOTE — ED PROVIDER NOTES
[address] [facility]  Pt Name: Lucio Romo  MRN: [id number]  [place] 1965  Date of evaluation: 2/11/2020  Provider: FABIAN Sharma CNP    CHIEF COMPLAINT       Chief Complaint   Patient presents with    Fatigue    Extremity Weakness       Nurses Notes reviewed and I agree except as noted in the HPI. HISTORY OF PRESENT ILLNESS    Lucio Romo is a 62 y.o. female whopresents to the emergency department from home for fatigue and extremity weakness secondary to back pain. Patient reports that her back pain shoots into her legs. She reports trouble with movement and getting up. Patient reports a history of similar back pain. She states that she has had 2 back surgeries in the past. Patient is on medications for her chronic pain, which she does take regularly. Patient reports that the last time her pain was this sever, she was septic. Patient denies any fever, abdominal pain or urinary complications with her pain. She denies any history of spinal complications. Patient has a history of rheumatoid arthritis. The patient denies any other symptoms or relevant history at this time. Onset: 1/27/2020  Location: lower back and legs  Duration: constant  Aggravating factors: movement  Radiation: legs and hips  Timing: constant  Severity: 8/10    Experienced previously: yes      HPI was provided by the patient. Triage notes and Nursing notes were reviewed by myself. Any discrepancies are addressed above. REVIEW OF SYSTEMS     Review of Systems   Constitutional: Positive for fatigue. Negative for chills and fever. HENT: Negative for congestion, ear pain, rhinorrhea and sore throat. Respiratory: Negative for cough, shortness of breath and wheezing. Cardiovascular: Negative for chest pain and palpitations. Gastrointestinal: Negative for abdominal pain, diarrhea, nausea and vomiting.    Genitourinary: Negative for difficulty urinating, frequency, hematuria, pelvic pain and by mouth daily Indications: Rheumatoid Arthritis       methotrexate (RHEUMATREX) 2.5 MG chemo tablet Take 17.5 mg by mouth every 7 days       omeprazole (PRILOSEC) 20 MG capsule Take 20 mg by mouth every other day Indications: Nonerosive GERD       alendronate (FOSAMAX) 70 MG tablet Take 70 mg by mouth every 7 days. Indications: Osteoporosis      Naproxen Sodium (ALEVE PO) Take by mouth      losartan-hydrochlorothiazide (HYZAAR) 50-12.5 MG per tablet Take 1 tablet by mouth daily  Qty: 90 tablet, Refills: 3    Associated Diagnoses: Essential hypertension             ALLERGIES     is allergic to remicade [infliximab]. FAMILY HISTORY     She indicated that her mother is . She indicated that her father is . She indicated that the status of her sister is unknown. She indicated that the status of her brother is unknown. She indicated that the status of her maternal aunt is unknown. She indicated that the status of her maternal uncle is unknown.   family history includes Arthritis in her maternal uncle, mother, and sister; Cancer in her father; Diabetes in her maternal aunt; Heart Disease in her mother; High Blood Pressure in her brother and father. SOCIAL HISTORY      reports that she quit smoking about 6 years ago. Her smoking use included cigarettes. She has a 7.50 pack-year smoking history. She has never used smokeless tobacco. She reports current alcohol use. She reports that she does not use drugs. PHYSICAL EXAM     INITIAL VITALS:  height is 5' 7\" (1.702 m) and weight is 170 lb (77.1 kg). Her oral temperature is 99.5 °F (37.5 °C). Her blood pressure is 125/66 and her pulse is 98. Her respiration is 16 and oxygen saturation is 98%. Physical Exam  Vitals signs and nursing note reviewed. Constitutional:       Appearance: She is well-developed. She is not toxic-appearing or diaphoretic. HENT:      Head: Normocephalic and atraumatic.       Right Ear: Tympanic membrane and external ear normal.      Left Ear: Tympanic membrane and external ear normal.      Nose: Nose normal.      Mouth/Throat:      Pharynx: No oropharyngeal exudate or posterior oropharyngeal erythema. Eyes:      Conjunctiva/sclera: Conjunctivae normal.   Neck:      Musculoskeletal: Normal range of motion and neck supple. Vascular: No JVD. Cardiovascular:      Rate and Rhythm: Normal rate and regular rhythm. Pulses: Normal pulses. Heart sounds: Normal heart sounds. No murmur. No friction rub. No gallop. Pulmonary:      Effort: Pulmonary effort is normal. No respiratory distress. Breath sounds: Normal breath sounds. No decreased air movement. No decreased breath sounds, wheezing, rhonchi or rales. Abdominal:      General: Bowel sounds are normal. There is no distension. Palpations: Abdomen is soft. Tenderness: There is no abdominal tenderness. There is no guarding or rebound. Musculoskeletal: Normal range of motion. Lumbar back: She exhibits tenderness. Comments: Tenderness across the lumbar spine. Generalized weakness to the extremities, worse on the left. Skin:     General: Skin is warm and dry. Findings: No rash. Neurological:      Mental Status: She is alert and oriented to person, place, and time. Motor: No abnormal muscle tone. Coordination: Coordination normal.           DIFFERENTIAL DIAGNOSIS:   Including but not limited to rheumatoid arthritis, post-op complications, lumbar strain. Less likely abscess.     DIAGNOSTIC RESULTS     EKG: AllEKG's are interpreted by the Emergency Department Physician who either signs or Co-signs this chart in the absence of a cardiologist.    EKG 12 Lead (Preliminary result)    Component (Lab Inquiry)   Collection Time Result Time Ventricular Rate Atrial Rate P-R Interval QRS Duration Q-T Interval   02/11/20 19:20:18 02/11/20 19:22:01 97 97 148 90 362       Collection Time Result Time QTc Calculation (Bazett) P Axis R Axis T Dr. Lani Young on 2/11/2020 8:56 PM      CT HEAD WO CONTRAST   Final Result      No acute ischemic infarct, hemorrhage, or mass effect. **This report has been created using voice recognition software. It may contain minor errors which are inherent in voice recognition technology. **      Final report electronically signed by Dr. Lani Young on 2/11/2020 8:50 PM      XR CHEST PORTABLE   Final Result      No acute cardiopulmonary disease. **This report has been created using voice recognition software. It may contain minor errors which are inherent in voice recognition technology. **      Final report electronically signed by Dr. Lani Young on 2/11/2020 7:52 PM      MRI BRAIN WO CONTRAST    (Results Pending)   MRI LUMBAR SPINE WO CONTRAST    (Results Pending)         LABS:   Labs Reviewed   BASIC METABOLIC PANEL - Abnormal; Notable for the following components:       Result Value    CO2 21 (*)     BUN 5 (*)     All other components within normal limits   CBC WITH AUTO DIFFERENTIAL - Abnormal; Notable for the following components:    RBC 3.91 (*)     Hemoglobin 11.3 (*)     Hematocrit 36.4 (*)     MCHC 31.0 (*)     RDW-CV 14.6 (*)     RDW-SD 49.4 (*)     Platelets 963 (*)     All other components within normal limits   HEPATIC FUNCTION PANEL - Abnormal; Notable for the following components:    ALT 10 (*)     Total Protein 8.5 (*)     All other components within normal limits   LIPASE - Abnormal; Notable for the following components:    Lipase 58.5 (*)     All other components within normal limits   PROTIME-INR - Abnormal; Notable for the following components:    INR 1.23 (*)     All other components within normal limits   CK - Abnormal; Notable for the following components:     Total  (*)     All other components within normal limits   C-REACTIVE PROTEIN - Abnormal; Notable for the following components:    CRP 8.61 (*)     All other components within normal limits   SEDIMENTATION RATE - Abnormal; Notable for the following components:    Sed Rate 117 (*)     All other components within normal limits   URINE WITH REFLEXED MICRO - Abnormal; Notable for the following components:    Bilirubin Urine SMALL (*)     Ketones, Urine 40 (*)     Protein, UA TRACE (*)     Color, UA DK YELLOW (*)     All other components within normal limits   OSMOLALITY - Abnormal; Notable for the following components:    Osmolality Calc 266.9 (*)     All other components within normal limits   RAPID INFLUENZA A/B ANTIGENS   CULTURE BLOOD #1   CULTURE BLOOD #2   MAGNESIUM   TROPONIN   TSH WITH REFLEX   URINE DRUG SCREEN   LACTIC ACID, PLASMA   AMMONIA   BILE ACIDS, TOTAL   ANION GAP   GLOMERULAR FILTRATION RATE, ESTIMATED   CBC   BASIC METABOLIC PANEL   VITAMIN N17 & FOLATE   FERRITIN   IRON         EMERGENCYDEPARTMENT COURSE AND MEDICAL DECISION MAKING:   Vitals:    Vitals:    02/11/20 2045 02/11/20 2132 02/11/20 2207 02/11/20 2300   BP:  121/63 133/69 125/66   Pulse: 100 97 97 98   Resp: 22 18 20 16   Temp:    99.5 °F (37.5 °C)   TempSrc:    Oral   SpO2: 100% 100% 100% 98%   Weight:       Height:             Pertinent Labs & Imaging studies reviewed. (See chart for details)           Controlled Substances Monitoring:     No flowsheet data found. The patient was seen and evaluated in atimely manner for bilateral lower extremity weakness. Patient states she has not been able to ambulate since January 27. She does have a history of rheumatoid arthritis. She is on an injectable medication that she gets every 8 weeks. She is not due for it into the first week of March. Reports to be compliant with her home medications of methotrexate and sulfasalazine. She has a generalized weakness of the lower extremities with the left side being weaker. Her reflexes are little weaker in the left side as well. Appropriate labs and imaging were ordered.   Her CRP and sed rate are elevated but the rest of her labs are

## 2020-02-12 NOTE — ED TRIAGE NOTES
Pt to ED with complaints of extreme fatigue and extremity weakness since 1/27/2020. Pt states that her pain is a 8/10 at this time but with movement 10/10. Pt alert and oriented x4. Pt vital signs stable and respirations unlabored. Pt states chronic back pain and back issues.

## 2020-02-12 NOTE — ED NOTES
Pt resting in bed in a semi fowlers position at this time. Pt medicated per provider orders. Pt vital signs stable and respirations unlabored. Pt denies needs. Pt has side rails up x2 and call light in reach.       Kevan Lisa RN  02/11/20 0920

## 2020-02-12 NOTE — CARE COORDINATION
DISCHARGE PLANNING EVALUATION: OP/OBSERVATION        2/12/20, 7:40 AM    Mara Nagy       Admitted from: ER 2/11/2020/ 1906   Location: 8A-20/020-A Reason for admit: Weakness [R53.1]   Admit order signed?: yes    Procedure: 2-11-20 CT Lumbar Spine  Status post L4-5 and L5-S1 discectomies with disc prostheses and pedicle screw fusion and L4 and L5 laminectomies.    No fracture or subluxation. Moderate L3-4 degenerative disc disease. L3-4 possible annular bulge and moderate spinal stenosis with probable impingement upon the right L3 nerve root.       See the accompanying abdomen pelvis CT report for discussion of nonspine findings. 2-11-20 CT Abd/Pelvis  No acute traumatic abnormality of the solid or hollow viscera of the abdomen and pelvis. No acute inflammatory or infectious process in the abdomen or pelvis. Colonic diverticulosis without evidence of diverticulitis. No evidence of bowel obstruction. Mild bilateral external iliac and inguinal probably reactive adenopathy. Postoperative changes of the lumbar spine, see above. Bibasilar interstitial infiltrates, acute versus chronic, see above. Pertinent Info/Orders/Treatment Plan:  Presented to ER with fatigue and weakness related to back pain. Hx of back surgeries x 2. Also, hx of RA. Telemetry. Consult to Neuro. Potential surgery in next 24-48 hours. PCP: FABIAN Shelton CNP    Patient Goals/Plan/Treatment Preferences: Met with pt this afternoon. Two siblings present with pt. PT is from home with spouse where she states they are self sufficient. She has help/support of family members as well. She does not work outside the home. [facility] or DME. She has a PCP, transportation and no issues obtaining medications. Discharge needs are undetermined at this time pending surgical outcomes and pt progression. CM to continue to follow for needs.    Transportation/Food Security/Housekeeping Addressed:  No issues identified.

## 2020-02-13 ENCOUNTER — ANESTHESIA (OUTPATIENT)
Dept: OPERATING ROOM | Age: 55
DRG: 460 | End: 2020-02-13
Payer: COMMERCIAL

## 2020-02-13 ENCOUNTER — APPOINTMENT (OUTPATIENT)
Dept: GENERAL RADIOLOGY | Age: 55
DRG: 460 | End: 2020-02-13
Payer: COMMERCIAL

## 2020-02-13 ENCOUNTER — ANESTHESIA EVENT (OUTPATIENT)
Dept: OPERATING ROOM | Age: 55
DRG: 460 | End: 2020-02-13
Payer: COMMERCIAL

## 2020-02-13 VITALS
SYSTOLIC BLOOD PRESSURE: 98 MMHG | TEMPERATURE: 96.8 F | DIASTOLIC BLOOD PRESSURE: 61 MMHG | RESPIRATION RATE: 3 BRPM | OXYGEN SATURATION: 100 %

## 2020-02-13 LAB
ALLEN TEST: ABNORMAL
ANION GAP SERPL CALCULATED.3IONS-SCNC: 12 MEQ/L (ref 8–16)
BASE EXCESS (CALCULATED): -2.9 MMOL/L (ref -2.5–2.5)
BASOPHILS # BLD: 0.2 %
BASOPHILS ABSOLUTE: 0 THOU/MM3 (ref 0–0.1)
BUN BLDV-MCNC: 7 MG/DL (ref 7–22)
CALCIUM IONIZED SERUM: 1.15 MMOL/L (ref 1.12–1.32)
CALCIUM SERPL-MCNC: 9.1 MG/DL (ref 8.5–10.5)
CHLORIDE BLD-SCNC: 111 MEQ/L (ref 98–111)
CO2: 21 MEQ/L (ref 23–33)
COLLECTED BY:: ABNORMAL
CREAT SERPL-MCNC: 0.4 MG/DL (ref 0.4–1.2)
EOSINOPHIL # BLD: 0.5 %
EOSINOPHILS ABSOLUTE: 0 THOU/MM3 (ref 0–0.4)
ERYTHROCYTE [DISTWIDTH] IN BLOOD BY AUTOMATED COUNT: 14.4 % (ref 11.5–14.5)
ERYTHROCYTE [DISTWIDTH] IN BLOOD BY AUTOMATED COUNT: 50.2 FL (ref 35–45)
GFR SERPL CREATININE-BSD FRML MDRD: > 90 ML/MIN/1.73M2
GLUCOSE BLD-MCNC: 111 MG/DL (ref 70–108)
GLUCOSE, WHOLE BLOOD: 150 MG/DL (ref 70–108)
HCO3: 22 MMOL/L (ref 23–28)
HCT VFR BLD CALC: 32.1 % (ref 37–47)
HEMOGLOBIN FINGERSTICK, POC: 7.9 G/DL (ref 12–16)
HEMOGLOBIN: 9.8 GM/DL (ref 12–16)
IFIO2: 40
IMMATURE GRANS (ABS): 0.04 THOU/MM3 (ref 0–0.07)
IMMATURE GRANULOCYTES: 0.4 %
LYMPHOCYTES # BLD: 12.2 %
LYMPHOCYTES ABSOLUTE: 1.2 THOU/MM3 (ref 1–4.8)
MCH RBC QN AUTO: 28.9 PG (ref 26–33)
MCHC RBC AUTO-ENTMCNC: 30.5 GM/DL (ref 32.2–35.5)
MCV RBC AUTO: 94.7 FL (ref 81–99)
MONOCYTES # BLD: 7 %
MONOCYTES ABSOLUTE: 0.7 THOU/MM3 (ref 0.4–1.3)
NUCLEATED RED BLOOD CELLS: 0 /100 WBC
O2 SATURATION: 100 %
PCO2: 37 MMHG (ref 35–45)
PH BLOOD GAS: 7.38 (ref 7.35–7.45)
PLATELET # BLD: 483 THOU/MM3 (ref 130–400)
PMV BLD AUTO: 10.7 FL (ref 9.4–12.4)
PO2: 167 MMHG (ref 71–104)
POTASSIUM SERPL-SCNC: 3.8 MEQ/L (ref 3.5–5.2)
POTASSIUM, WHOLE BLOOD: 3.8 MEQ/L (ref 3.5–4.9)
RBC # BLD: 3.39 MILL/MM3 (ref 4.2–5.4)
SEG NEUTROPHILS: 79.7 %
SEGMENTED NEUTROPHILS ABSOLUTE COUNT: 7.9 THOU/MM3 (ref 1.8–7.7)
SODIUM BLD-SCNC: 144 MEQ/L (ref 135–145)
SODIUM, WHOLE BLOOD: 145 MEQ/L (ref 138–146)
SOURCE, BLOOD GAS: ABNORMAL
WBC # BLD: 9.9 THOU/MM3 (ref 4.8–10.8)

## 2020-02-13 PROCEDURE — 6360000002 HC RX W HCPCS

## 2020-02-13 PROCEDURE — 3700000001 HC ADD 15 MINUTES (ANESTHESIA): Performed by: NEUROLOGICAL SURGERY

## 2020-02-13 PROCEDURE — 85025 COMPLETE CBC W/AUTO DIFF WBC: CPT

## 2020-02-13 PROCEDURE — 6360000002 HC RX W HCPCS: Performed by: NURSE ANESTHETIST, CERTIFIED REGISTERED

## 2020-02-13 PROCEDURE — 36415 COLL VENOUS BLD VENIPUNCTURE: CPT

## 2020-02-13 PROCEDURE — 2580000003 HC RX 258: Performed by: INTERNAL MEDICINE

## 2020-02-13 PROCEDURE — 3600000015 HC SURGERY LEVEL 5 ADDTL 15MIN: Performed by: NEUROLOGICAL SURGERY

## 2020-02-13 PROCEDURE — 3700000000 HC ANESTHESIA ATTENDED CARE: Performed by: NEUROLOGICAL SURGERY

## 2020-02-13 PROCEDURE — 6360000002 HC RX W HCPCS: Performed by: INTERNAL MEDICINE

## 2020-02-13 PROCEDURE — 6360000002 HC RX W HCPCS: Performed by: NEUROLOGICAL SURGERY

## 2020-02-13 PROCEDURE — 84132 ASSAY OF SERUM POTASSIUM: CPT

## 2020-02-13 PROCEDURE — 82330 ASSAY OF CALCIUM: CPT

## 2020-02-13 PROCEDURE — 22612 ARTHRD PST TQ 1NTRSPC LUMBAR: CPT | Performed by: NEUROLOGICAL SURGERY

## 2020-02-13 PROCEDURE — 7100000000 HC PACU RECOVERY - FIRST 15 MIN: Performed by: NEUROLOGICAL SURGERY

## 2020-02-13 PROCEDURE — 2580000003 HC RX 258: Performed by: NEUROLOGICAL SURGERY

## 2020-02-13 PROCEDURE — 6360000002 HC RX W HCPCS: Performed by: ANESTHESIOLOGY

## 2020-02-13 PROCEDURE — 2580000003 HC RX 258: Performed by: NURSE ANESTHETIST, CERTIFIED REGISTERED

## 2020-02-13 PROCEDURE — 2500000003 HC RX 250 WO HCPCS: Performed by: NURSE ANESTHETIST, CERTIFIED REGISTERED

## 2020-02-13 PROCEDURE — 3209999900 FLUORO FOR SURGICAL PROCEDURES

## 2020-02-13 PROCEDURE — C1713 ANCHOR/SCREW BN/BN,TIS/BN: HCPCS | Performed by: NEUROLOGICAL SURGERY

## 2020-02-13 PROCEDURE — 80048 BASIC METABOLIC PNL TOTAL CA: CPT

## 2020-02-13 PROCEDURE — 7100000001 HC PACU RECOVERY - ADDTL 15 MIN: Performed by: NEUROLOGICAL SURGERY

## 2020-02-13 PROCEDURE — 6370000000 HC RX 637 (ALT 250 FOR IP): Performed by: NEUROLOGICAL SURGERY

## 2020-02-13 PROCEDURE — 2780000010 HC IMPLANT OTHER: Performed by: NEUROLOGICAL SURGERY

## 2020-02-13 PROCEDURE — 2060000000 HC ICU INTERMEDIATE R&B

## 2020-02-13 PROCEDURE — 84295 ASSAY OF SERUM SODIUM: CPT

## 2020-02-13 PROCEDURE — 2709999900 HC NON-CHARGEABLE SUPPLY

## 2020-02-13 PROCEDURE — 22840 INSERT SPINE FIXATION DEVICE: CPT | Performed by: NEUROLOGICAL SURGERY

## 2020-02-13 PROCEDURE — 0SP004Z REMOVAL OF INTERNAL FIXATION DEVICE FROM LUMBAR VERTEBRAL JOINT, OPEN APPROACH: ICD-10-PCS | Performed by: NEUROLOGICAL SURGERY

## 2020-02-13 PROCEDURE — 82803 BLOOD GASES ANY COMBINATION: CPT

## 2020-02-13 PROCEDURE — 2709999900 HC NON-CHARGEABLE SUPPLY: Performed by: NEUROLOGICAL SURGERY

## 2020-02-13 PROCEDURE — 99232 SBSQ HOSP IP/OBS MODERATE 35: CPT | Performed by: PHYSICIAN ASSISTANT

## 2020-02-13 PROCEDURE — 01NB0ZZ RELEASE LUMBAR NERVE, OPEN APPROACH: ICD-10-PCS | Performed by: NEUROLOGICAL SURGERY

## 2020-02-13 PROCEDURE — 82947 ASSAY GLUCOSE BLOOD QUANT: CPT

## 2020-02-13 PROCEDURE — 6370000000 HC RX 637 (ALT 250 FOR IP): Performed by: INTERNAL MEDICINE

## 2020-02-13 PROCEDURE — 3600000005 HC SURGERY LEVEL 5 BASE: Performed by: NEUROLOGICAL SURGERY

## 2020-02-13 PROCEDURE — 37799 UNLISTED PX VASCULAR SURGERY: CPT

## 2020-02-13 PROCEDURE — P9045 ALBUMIN (HUMAN), 5%, 250 ML: HCPCS | Performed by: NURSE ANESTHETIST, CERTIFIED REGISTERED

## 2020-02-13 PROCEDURE — 63047 LAM FACETEC & FORAMOT LUMBAR: CPT | Performed by: NEUROLOGICAL SURGERY

## 2020-02-13 PROCEDURE — 85018 HEMOGLOBIN: CPT

## 2020-02-13 PROCEDURE — 72100 X-RAY EXAM L-S SPINE 2/3 VWS: CPT

## 2020-02-13 PROCEDURE — 0SG00K1 FUSION OF LUMBAR VERTEBRAL JOINT WITH NONAUTOLOGOUS TISSUE SUBSTITUTE, POSTERIOR APPROACH, POSTERIOR COLUMN, OPEN APPROACH: ICD-10-PCS | Performed by: NEUROLOGICAL SURGERY

## 2020-02-13 PROCEDURE — 2720000010 HC SURG SUPPLY STERILE: Performed by: NEUROLOGICAL SURGERY

## 2020-02-13 DEVICE — DBM T41120 1CC GRAFTON GEL
Type: IMPLANTABLE DEVICE | Site: SPINE LUMBAR | Status: FUNCTIONAL
Brand: GRAFTON®AND GRAFTON PLUS®DEMINERALIZED BONE MATRIX (DBM)

## 2020-02-13 DEVICE — SCREW 55840006550 5.5/6 MAS 6.5X50 CC
Type: IMPLANTABLE DEVICE | Site: SPINE LUMBAR | Status: FUNCTIONAL
Brand: CD HORIZON® SPINAL SYSTEM

## 2020-02-13 DEVICE — DURAGEN® SUTURABLE DURAL REGENERATION MATRIX, 2 IN X 2 IN (5 CM X 5 CM)
Type: IMPLANTABLE DEVICE | Site: SPINE LUMBAR | Status: FUNCTIONAL
Brand: DURAGEN® SUTURABLE

## 2020-02-13 DEVICE — ROD 1553201070 5.5 TI CP4 NS CURV 70MM
Type: IMPLANTABLE DEVICE | Status: FUNCTIONAL
Brand: CD HORIZON® SPINAL SYSTEM

## 2020-02-13 RX ORDER — NEOSTIGMINE METHYLSULFATE 5 MG/5 ML
SYRINGE (ML) INTRAVENOUS PRN
Status: DISCONTINUED | OUTPATIENT
Start: 2020-02-13 | End: 2020-02-13 | Stop reason: SDUPTHER

## 2020-02-13 RX ORDER — LOSARTAN POTASSIUM AND HYDROCHLOROTHIAZIDE 12.5; 5 MG/1; MG/1
1 TABLET ORAL DAILY
Status: DISCONTINUED | OUTPATIENT
Start: 2020-02-13 | End: 2020-02-13 | Stop reason: SDUPTHER

## 2020-02-13 RX ORDER — DEXAMETHASONE SODIUM PHOSPHATE 4 MG/ML
INJECTION, SOLUTION INTRA-ARTICULAR; INTRALESIONAL; INTRAMUSCULAR; INTRAVENOUS; SOFT TISSUE PRN
Status: DISCONTINUED | OUTPATIENT
Start: 2020-02-13 | End: 2020-02-13 | Stop reason: SDUPTHER

## 2020-02-13 RX ORDER — MORPHINE SULFATE 2 MG/ML
2 INJECTION, SOLUTION INTRAMUSCULAR; INTRAVENOUS
Status: DISCONTINUED | OUTPATIENT
Start: 2020-02-13 | End: 2020-02-15

## 2020-02-13 RX ORDER — MEPERIDINE HYDROCHLORIDE 25 MG/ML
12.5 INJECTION INTRAMUSCULAR; INTRAVENOUS; SUBCUTANEOUS EVERY 5 MIN PRN
Status: DISCONTINUED | OUTPATIENT
Start: 2020-02-13 | End: 2020-02-13 | Stop reason: HOSPADM

## 2020-02-13 RX ORDER — VANCOMYCIN HYDROCHLORIDE 1 G/20ML
INJECTION, POWDER, LYOPHILIZED, FOR SOLUTION INTRAVENOUS PRN
Status: DISCONTINUED | OUTPATIENT
Start: 2020-02-13 | End: 2020-02-13 | Stop reason: ALTCHOICE

## 2020-02-13 RX ORDER — SODIUM CHLORIDE 9 MG/ML
INJECTION, SOLUTION INTRAVENOUS CONTINUOUS PRN
Status: DISCONTINUED | OUTPATIENT
Start: 2020-02-13 | End: 2020-02-13 | Stop reason: SDUPTHER

## 2020-02-13 RX ORDER — FENTANYL CITRATE 50 UG/ML
25 INJECTION, SOLUTION INTRAMUSCULAR; INTRAVENOUS EVERY 5 MIN PRN
Status: DISCONTINUED | OUTPATIENT
Start: 2020-02-13 | End: 2020-02-13 | Stop reason: HOSPADM

## 2020-02-13 RX ORDER — ROCURONIUM BROMIDE 10 MG/ML
INJECTION, SOLUTION INTRAVENOUS PRN
Status: DISCONTINUED | OUTPATIENT
Start: 2020-02-13 | End: 2020-02-13 | Stop reason: SDUPTHER

## 2020-02-13 RX ORDER — EPHEDRINE SULFATE/0.9% NACL/PF 50 MG/5 ML
SYRINGE (ML) INTRAVENOUS PRN
Status: DISCONTINUED | OUTPATIENT
Start: 2020-02-13 | End: 2020-02-13 | Stop reason: SDUPTHER

## 2020-02-13 RX ORDER — ONDANSETRON 2 MG/ML
INJECTION INTRAMUSCULAR; INTRAVENOUS PRN
Status: DISCONTINUED | OUTPATIENT
Start: 2020-02-13 | End: 2020-02-13 | Stop reason: SDUPTHER

## 2020-02-13 RX ORDER — FENTANYL CITRATE 50 UG/ML
INJECTION, SOLUTION INTRAMUSCULAR; INTRAVENOUS
Status: COMPLETED
Start: 2020-02-13 | End: 2020-02-13

## 2020-02-13 RX ORDER — KETOROLAC TROMETHAMINE 30 MG/ML
INJECTION, SOLUTION INTRAMUSCULAR; INTRAVENOUS
Status: COMPLETED
Start: 2020-02-13 | End: 2020-02-13

## 2020-02-13 RX ORDER — LABETALOL 20 MG/4 ML (5 MG/ML) INTRAVENOUS SYRINGE
5 EVERY 10 MIN PRN
Status: DISCONTINUED | OUTPATIENT
Start: 2020-02-13 | End: 2020-02-13 | Stop reason: HOSPADM

## 2020-02-13 RX ORDER — KETOROLAC TROMETHAMINE 30 MG/ML
15 INJECTION, SOLUTION INTRAMUSCULAR; INTRAVENOUS ONCE
Status: COMPLETED | OUTPATIENT
Start: 2020-02-13 | End: 2020-02-13

## 2020-02-13 RX ORDER — FENTANYL CITRATE 50 UG/ML
INJECTION, SOLUTION INTRAMUSCULAR; INTRAVENOUS PRN
Status: DISCONTINUED | OUTPATIENT
Start: 2020-02-13 | End: 2020-02-13 | Stop reason: SDUPTHER

## 2020-02-13 RX ORDER — ONDANSETRON 2 MG/ML
4 INJECTION INTRAMUSCULAR; INTRAVENOUS EVERY 6 HOURS PRN
Status: DISCONTINUED | OUTPATIENT
Start: 2020-02-13 | End: 2020-02-21 | Stop reason: HOSPADM

## 2020-02-13 RX ORDER — CEFAZOLIN SODIUM 1 G/3ML
INJECTION, POWDER, FOR SOLUTION INTRAMUSCULAR; INTRAVENOUS PRN
Status: DISCONTINUED | OUTPATIENT
Start: 2020-02-13 | End: 2020-02-13 | Stop reason: SDUPTHER

## 2020-02-13 RX ORDER — PROPOFOL 10 MG/ML
INJECTION, EMULSION INTRAVENOUS PRN
Status: DISCONTINUED | OUTPATIENT
Start: 2020-02-13 | End: 2020-02-13 | Stop reason: SDUPTHER

## 2020-02-13 RX ORDER — LOSARTAN POTASSIUM 50 MG/1
50 TABLET ORAL DAILY
Status: DISCONTINUED | OUTPATIENT
Start: 2020-02-13 | End: 2020-02-21 | Stop reason: HOSPADM

## 2020-02-13 RX ORDER — CYCLOBENZAPRINE HCL 10 MG
10 TABLET ORAL 3 TIMES DAILY PRN
Status: DISCONTINUED | OUTPATIENT
Start: 2020-02-13 | End: 2020-02-21 | Stop reason: HOSPADM

## 2020-02-13 RX ORDER — SODIUM CHLORIDE 0.9 % (FLUSH) 0.9 %
10 SYRINGE (ML) INJECTION PRN
Status: DISCONTINUED | OUTPATIENT
Start: 2020-02-13 | End: 2020-02-21 | Stop reason: HOSPADM

## 2020-02-13 RX ORDER — FENTANYL CITRATE 50 UG/ML
50 INJECTION, SOLUTION INTRAMUSCULAR; INTRAVENOUS EVERY 5 MIN PRN
Status: DISCONTINUED | OUTPATIENT
Start: 2020-02-13 | End: 2020-02-13 | Stop reason: HOSPADM

## 2020-02-13 RX ORDER — HYDROCHLOROTHIAZIDE 12.5 MG/1
12.5 CAPSULE, GELATIN COATED ORAL DAILY
Status: DISCONTINUED | OUTPATIENT
Start: 2020-02-13 | End: 2020-02-21 | Stop reason: HOSPADM

## 2020-02-13 RX ORDER — PROMETHAZINE HYDROCHLORIDE 25 MG/ML
12.5 INJECTION, SOLUTION INTRAMUSCULAR; INTRAVENOUS
Status: DISCONTINUED | OUTPATIENT
Start: 2020-02-13 | End: 2020-02-13 | Stop reason: HOSPADM

## 2020-02-13 RX ORDER — SODIUM CHLORIDE 0.9 % (FLUSH) 0.9 %
10 SYRINGE (ML) INJECTION EVERY 12 HOURS SCHEDULED
Status: DISCONTINUED | OUTPATIENT
Start: 2020-02-13 | End: 2020-02-21 | Stop reason: HOSPADM

## 2020-02-13 RX ORDER — TRANEXAMIC ACID 100 MG/ML
INJECTION, SOLUTION INTRAVENOUS PRN
Status: DISCONTINUED | OUTPATIENT
Start: 2020-02-13 | End: 2020-02-13 | Stop reason: SDUPTHER

## 2020-02-13 RX ORDER — SODIUM CHLORIDE, SODIUM LACTATE, POTASSIUM CHLORIDE, CALCIUM CHLORIDE 600; 310; 30; 20 MG/100ML; MG/100ML; MG/100ML; MG/100ML
INJECTION, SOLUTION INTRAVENOUS CONTINUOUS PRN
Status: DISCONTINUED | OUTPATIENT
Start: 2020-02-13 | End: 2020-02-13 | Stop reason: SDUPTHER

## 2020-02-13 RX ORDER — GINSENG 100 MG
CAPSULE ORAL PRN
Status: DISCONTINUED | OUTPATIENT
Start: 2020-02-13 | End: 2020-02-13 | Stop reason: ALTCHOICE

## 2020-02-13 RX ORDER — GLYCOPYRROLATE 1 MG/5 ML
SYRINGE (ML) INTRAVENOUS PRN
Status: DISCONTINUED | OUTPATIENT
Start: 2020-02-13 | End: 2020-02-13 | Stop reason: SDUPTHER

## 2020-02-13 RX ORDER — ALBUMIN, HUMAN INJ 5% 5 %
SOLUTION INTRAVENOUS PRN
Status: DISCONTINUED | OUTPATIENT
Start: 2020-02-13 | End: 2020-02-13 | Stop reason: SDUPTHER

## 2020-02-13 RX ORDER — KETOROLAC TROMETHAMINE 30 MG/ML
15 INJECTION, SOLUTION INTRAMUSCULAR; INTRAVENOUS EVERY 8 HOURS PRN
Status: ACTIVE | OUTPATIENT
Start: 2020-02-13 | End: 2020-02-18

## 2020-02-13 RX ORDER — LIDOCAINE HCL/PF 100 MG/5ML
SYRINGE (ML) INJECTION PRN
Status: DISCONTINUED | OUTPATIENT
Start: 2020-02-13 | End: 2020-02-13 | Stop reason: SDUPTHER

## 2020-02-13 RX ADMIN — FENTANYL CITRATE 50 MCG: 50 INJECTION INTRAMUSCULAR; INTRAVENOUS at 09:41

## 2020-02-13 RX ADMIN — FENTANYL CITRATE 50 MCG: 50 INJECTION INTRAMUSCULAR; INTRAVENOUS at 10:54

## 2020-02-13 RX ADMIN — FENTANYL CITRATE 50 MCG: 50 INJECTION, SOLUTION INTRAMUSCULAR; INTRAVENOUS at 15:17

## 2020-02-13 RX ADMIN — ROCURONIUM BROMIDE 40 MG: 10 INJECTION INTRAVENOUS at 08:14

## 2020-02-13 RX ADMIN — Medication 80 MG: at 08:11

## 2020-02-13 RX ADMIN — PANTOPRAZOLE SODIUM 40 MG: 40 TABLET, DELAYED RELEASE ORAL at 06:21

## 2020-02-13 RX ADMIN — SULFASALAZINE 1000 MG: 500 TABLET ORAL at 23:51

## 2020-02-13 RX ADMIN — ALBUMIN (HUMAN) 250 ML: 12.5 SOLUTION INTRAVENOUS at 13:02

## 2020-02-13 RX ADMIN — CEFAZOLIN 2000 MG: 1 INJECTION, POWDER, FOR SOLUTION INTRAMUSCULAR; INTRAVENOUS; PARENTERAL at 12:46

## 2020-02-13 RX ADMIN — DEXAMETHASONE SODIUM PHOSPHATE 10 MG: 4 INJECTION, SOLUTION INTRAMUSCULAR; INTRAVENOUS at 09:15

## 2020-02-13 RX ADMIN — CEFAZOLIN 2000 MG: 1 INJECTION, POWDER, FOR SOLUTION INTRAMUSCULAR; INTRAVENOUS; PARENTERAL at 08:44

## 2020-02-13 RX ADMIN — PHENYLEPHRINE HYDROCHLORIDE 100 MCG: 10 INJECTION INTRAVENOUS at 08:29

## 2020-02-13 RX ADMIN — FENTANYL CITRATE 25 MCG: 50 INJECTION INTRAMUSCULAR; INTRAVENOUS at 14:35

## 2020-02-13 RX ADMIN — KETOROLAC TROMETHAMINE 15 MG: 30 INJECTION, SOLUTION INTRAMUSCULAR; INTRAVENOUS at 15:39

## 2020-02-13 RX ADMIN — Medication 10 MG: at 08:53

## 2020-02-13 RX ADMIN — MORPHINE SULFATE 2 MG: 2 INJECTION, SOLUTION INTRAMUSCULAR; INTRAVENOUS at 23:52

## 2020-02-13 RX ADMIN — FENTANYL CITRATE 100 MCG: 50 INJECTION INTRAMUSCULAR; INTRAVENOUS at 08:11

## 2020-02-13 RX ADMIN — SODIUM CHLORIDE: 9 INJECTION, SOLUTION INTRAVENOUS at 08:05

## 2020-02-13 RX ADMIN — ROCURONIUM BROMIDE 10 MG: 10 INJECTION INTRAVENOUS at 08:48

## 2020-02-13 RX ADMIN — HYDROMORPHONE HYDROCHLORIDE 0.5 MG: 1 INJECTION, SOLUTION INTRAMUSCULAR; INTRAVENOUS; SUBCUTANEOUS at 15:45

## 2020-02-13 RX ADMIN — Medication 10 MG: at 14:11

## 2020-02-13 RX ADMIN — LOSARTAN POTASSIUM 50 MG: 50 TABLET, FILM COATED ORAL at 19:01

## 2020-02-13 RX ADMIN — FENTANYL CITRATE 50 MCG: 50 INJECTION INTRAMUSCULAR; INTRAVENOUS at 11:12

## 2020-02-13 RX ADMIN — AZITHROMYCIN MONOHYDRATE 500 MG: 500 INJECTION, POWDER, LYOPHILIZED, FOR SOLUTION INTRAVENOUS at 00:35

## 2020-02-13 RX ADMIN — FENTANYL CITRATE 25 MCG: 50 INJECTION INTRAMUSCULAR; INTRAVENOUS at 14:54

## 2020-02-13 RX ADMIN — SODIUM CHLORIDE, POTASSIUM CHLORIDE, SODIUM LACTATE AND CALCIUM CHLORIDE: 600; 310; 30; 20 INJECTION, SOLUTION INTRAVENOUS at 09:01

## 2020-02-13 RX ADMIN — FENTANYL CITRATE 50 MCG: 50 INJECTION INTRAMUSCULAR; INTRAVENOUS at 10:46

## 2020-02-13 RX ADMIN — FENTANYL CITRATE 50 MCG: 50 INJECTION, SOLUTION INTRAMUSCULAR; INTRAVENOUS at 15:29

## 2020-02-13 RX ADMIN — Medication 3 MG: at 14:40

## 2020-02-13 RX ADMIN — FENTANYL CITRATE 50 MCG: 50 INJECTION INTRAMUSCULAR; INTRAVENOUS at 09:08

## 2020-02-13 RX ADMIN — SODIUM CHLORIDE: 9 INJECTION, SOLUTION INTRAVENOUS at 19:01

## 2020-02-13 RX ADMIN — FENTANYL CITRATE 50 MCG: 50 INJECTION INTRAMUSCULAR; INTRAVENOUS at 13:09

## 2020-02-13 RX ADMIN — KETOROLAC TROMETHAMINE 15 MG: 30 INJECTION, SOLUTION INTRAMUSCULAR at 15:39

## 2020-02-13 RX ADMIN — TRANEXAMIC ACID 1000 MG: 100 INJECTION, SOLUTION INTRAVENOUS at 08:58

## 2020-02-13 RX ADMIN — PROPOFOL 150 MG: 10 INJECTION, EMULSION INTRAVENOUS at 08:11

## 2020-02-13 RX ADMIN — Medication 0.6 MG: at 14:39

## 2020-02-13 RX ADMIN — HYDROCHLOROTHIAZIDE 12.5 MG: 12.5 CAPSULE ORAL at 19:02

## 2020-02-13 RX ADMIN — DEXTROSE MONOHYDRATE 2 G: 50 INJECTION, SOLUTION INTRAVENOUS at 22:03

## 2020-02-13 RX ADMIN — FENTANYL CITRATE 50 MCG: 50 INJECTION INTRAMUSCULAR; INTRAVENOUS at 14:50

## 2020-02-13 RX ADMIN — ONDANSETRON HYDROCHLORIDE 4 MG: 4 INJECTION, SOLUTION INTRAMUSCULAR; INTRAVENOUS at 13:47

## 2020-02-13 RX ADMIN — FENTANYL CITRATE 50 MCG: 50 INJECTION INTRAMUSCULAR; INTRAVENOUS at 14:31

## 2020-02-13 ASSESSMENT — PULMONARY FUNCTION TESTS
PIF_VALUE: 25
PIF_VALUE: 23
PIF_VALUE: 25
PIF_VALUE: 30
PIF_VALUE: 25
PIF_VALUE: 24
PIF_VALUE: 23
PIF_VALUE: 24
PIF_VALUE: 26
PIF_VALUE: 25
PIF_VALUE: 23
PIF_VALUE: 22
PIF_VALUE: 26
PIF_VALUE: 25
PIF_VALUE: 23
PIF_VALUE: 25
PIF_VALUE: 3
PIF_VALUE: 25
PIF_VALUE: 20
PIF_VALUE: 0
PIF_VALUE: 24
PIF_VALUE: 24
PIF_VALUE: 1
PIF_VALUE: 25
PIF_VALUE: 25
PIF_VALUE: 24
PIF_VALUE: 25
PIF_VALUE: 25
PIF_VALUE: 26
PIF_VALUE: 23
PIF_VALUE: 25
PIF_VALUE: 24
PIF_VALUE: 25
PIF_VALUE: 2
PIF_VALUE: 23
PIF_VALUE: 23
PIF_VALUE: 24
PIF_VALUE: 25
PIF_VALUE: 22
PIF_VALUE: 25
PIF_VALUE: 23
PIF_VALUE: 23
PIF_VALUE: 1
PIF_VALUE: 25
PIF_VALUE: 25
PIF_VALUE: 24
PIF_VALUE: 1
PIF_VALUE: 26
PIF_VALUE: 25
PIF_VALUE: 24
PIF_VALUE: 22
PIF_VALUE: 13
PIF_VALUE: 22
PIF_VALUE: 24
PIF_VALUE: 0
PIF_VALUE: 24
PIF_VALUE: 1
PIF_VALUE: 0
PIF_VALUE: 25
PIF_VALUE: 3
PIF_VALUE: 25
PIF_VALUE: 9
PIF_VALUE: 23
PIF_VALUE: 23
PIF_VALUE: 25
PIF_VALUE: 3
PIF_VALUE: 25
PIF_VALUE: 24
PIF_VALUE: 9
PIF_VALUE: 5
PIF_VALUE: 24
PIF_VALUE: 21
PIF_VALUE: 24
PIF_VALUE: 14
PIF_VALUE: 25
PIF_VALUE: 26
PIF_VALUE: 25
PIF_VALUE: 24
PIF_VALUE: 26
PIF_VALUE: 25
PIF_VALUE: 24
PIF_VALUE: 20
PIF_VALUE: 4
PIF_VALUE: 25
PIF_VALUE: 25
PIF_VALUE: 24
PIF_VALUE: 24
PIF_VALUE: 1
PIF_VALUE: 23
PIF_VALUE: 25
PIF_VALUE: 25
PIF_VALUE: 24
PIF_VALUE: 24
PIF_VALUE: 22
PIF_VALUE: 22
PIF_VALUE: 25
PIF_VALUE: 24
PIF_VALUE: 26
PIF_VALUE: 25
PIF_VALUE: 23
PIF_VALUE: 24
PIF_VALUE: 25
PIF_VALUE: 25
PIF_VALUE: 24
PIF_VALUE: 23
PIF_VALUE: 27
PIF_VALUE: 23
PIF_VALUE: 25
PIF_VALUE: 25
PIF_VALUE: 26
PIF_VALUE: 24
PIF_VALUE: 24
PIF_VALUE: 23
PIF_VALUE: 24
PIF_VALUE: 22
PIF_VALUE: 24
PIF_VALUE: 25
PIF_VALUE: 23
PIF_VALUE: 25
PIF_VALUE: 24
PIF_VALUE: 21
PIF_VALUE: 23
PIF_VALUE: 24
PIF_VALUE: 25
PIF_VALUE: 24
PIF_VALUE: 25
PIF_VALUE: 23
PIF_VALUE: 25
PIF_VALUE: 24
PIF_VALUE: 25
PIF_VALUE: 22
PIF_VALUE: 21
PIF_VALUE: 23
PIF_VALUE: 23
PIF_VALUE: 25
PIF_VALUE: 23
PIF_VALUE: 24
PIF_VALUE: 1
PIF_VALUE: 26
PIF_VALUE: 25
PIF_VALUE: 25
PIF_VALUE: 23
PIF_VALUE: 25
PIF_VALUE: 26
PIF_VALUE: 25
PIF_VALUE: 25
PIF_VALUE: 24
PIF_VALUE: 24
PIF_VALUE: 25
PIF_VALUE: 22
PIF_VALUE: 25
PIF_VALUE: 25
PIF_VALUE: 23
PIF_VALUE: 24
PIF_VALUE: 25
PIF_VALUE: 24
PIF_VALUE: 23
PIF_VALUE: 25
PIF_VALUE: 24
PIF_VALUE: 23
PIF_VALUE: 21
PIF_VALUE: 23
PIF_VALUE: 25
PIF_VALUE: 23
PIF_VALUE: 24
PIF_VALUE: 23
PIF_VALUE: 22
PIF_VALUE: 25
PIF_VALUE: 3
PIF_VALUE: 25
PIF_VALUE: 22
PIF_VALUE: 22
PIF_VALUE: 24
PIF_VALUE: 2
PIF_VALUE: 8
PIF_VALUE: 23
PIF_VALUE: 24
PIF_VALUE: 24
PIF_VALUE: 25
PIF_VALUE: 24
PIF_VALUE: 23
PIF_VALUE: 25
PIF_VALUE: 23
PIF_VALUE: 23
PIF_VALUE: 24
PIF_VALUE: 24
PIF_VALUE: 32
PIF_VALUE: 25
PIF_VALUE: 22
PIF_VALUE: 25
PIF_VALUE: 24
PIF_VALUE: 24
PIF_VALUE: 22
PIF_VALUE: 25
PIF_VALUE: 22
PIF_VALUE: 23
PIF_VALUE: 2
PIF_VALUE: 24
PIF_VALUE: 24
PIF_VALUE: 23
PIF_VALUE: 25
PIF_VALUE: 21
PIF_VALUE: 26
PIF_VALUE: 25
PIF_VALUE: 24
PIF_VALUE: 1
PIF_VALUE: 6
PIF_VALUE: 25
PIF_VALUE: 24
PIF_VALUE: 25
PIF_VALUE: 25
PIF_VALUE: 6
PIF_VALUE: 0
PIF_VALUE: 24
PIF_VALUE: 22
PIF_VALUE: 12
PIF_VALUE: 25
PIF_VALUE: 17
PIF_VALUE: 25
PIF_VALUE: 24
PIF_VALUE: 25
PIF_VALUE: 25
PIF_VALUE: 24
PIF_VALUE: 23
PIF_VALUE: 24
PIF_VALUE: 2
PIF_VALUE: 24
PIF_VALUE: 25
PIF_VALUE: 27
PIF_VALUE: 24
PIF_VALUE: 25
PIF_VALUE: 24
PIF_VALUE: 25
PIF_VALUE: 24
PIF_VALUE: 25
PIF_VALUE: 1
PIF_VALUE: 24
PIF_VALUE: 25
PIF_VALUE: 24
PIF_VALUE: 23
PIF_VALUE: 25
PIF_VALUE: 8
PIF_VALUE: 26
PIF_VALUE: 25
PIF_VALUE: 25
PIF_VALUE: 22
PIF_VALUE: 24
PIF_VALUE: 25
PIF_VALUE: 24
PIF_VALUE: 3
PIF_VALUE: 23
PIF_VALUE: 24
PIF_VALUE: 22
PIF_VALUE: 24
PIF_VALUE: 23
PIF_VALUE: 24
PIF_VALUE: 25
PIF_VALUE: 22
PIF_VALUE: 24
PIF_VALUE: 4
PIF_VALUE: 25
PIF_VALUE: 20
PIF_VALUE: 24
PIF_VALUE: 25
PIF_VALUE: 25
PIF_VALUE: 3
PIF_VALUE: 25
PIF_VALUE: 16
PIF_VALUE: 25
PIF_VALUE: 26
PIF_VALUE: 26
PIF_VALUE: 23
PIF_VALUE: 1
PIF_VALUE: 25
PIF_VALUE: 23
PIF_VALUE: 25
PIF_VALUE: 22
PIF_VALUE: 24
PIF_VALUE: 25
PIF_VALUE: 24
PIF_VALUE: 25
PIF_VALUE: 24
PIF_VALUE: 25
PIF_VALUE: 23
PIF_VALUE: 23
PIF_VALUE: 1
PIF_VALUE: 25
PIF_VALUE: 23
PIF_VALUE: 25
PIF_VALUE: 1
PIF_VALUE: 24
PIF_VALUE: 25
PIF_VALUE: 24
PIF_VALUE: 25
PIF_VALUE: 3
PIF_VALUE: 25
PIF_VALUE: 25
PIF_VALUE: 3
PIF_VALUE: 4
PIF_VALUE: 23
PIF_VALUE: 9
PIF_VALUE: 24
PIF_VALUE: 23
PIF_VALUE: 25
PIF_VALUE: 20
PIF_VALUE: 0
PIF_VALUE: 25
PIF_VALUE: 23
PIF_VALUE: 25
PIF_VALUE: 25
PIF_VALUE: 23
PIF_VALUE: 26
PIF_VALUE: 22
PIF_VALUE: 25
PIF_VALUE: 25
PIF_VALUE: 23
PIF_VALUE: 25
PIF_VALUE: 24
PIF_VALUE: 22
PIF_VALUE: 25
PIF_VALUE: 22
PIF_VALUE: 24
PIF_VALUE: 22
PIF_VALUE: 25
PIF_VALUE: 25
PIF_VALUE: 24
PIF_VALUE: 23
PIF_VALUE: 0
PIF_VALUE: 25
PIF_VALUE: 23
PIF_VALUE: 1
PIF_VALUE: 24
PIF_VALUE: 25
PIF_VALUE: 25
PIF_VALUE: 23
PIF_VALUE: 27
PIF_VALUE: 20
PIF_VALUE: 26
PIF_VALUE: 23
PIF_VALUE: 24
PIF_VALUE: 25
PIF_VALUE: 25
PIF_VALUE: 27
PIF_VALUE: 25
PIF_VALUE: 22
PIF_VALUE: 26
PIF_VALUE: 24
PIF_VALUE: 23
PIF_VALUE: 24
PIF_VALUE: 1
PIF_VALUE: 4
PIF_VALUE: 24
PIF_VALUE: 25
PIF_VALUE: 26
PIF_VALUE: 25
PIF_VALUE: 24
PIF_VALUE: 6
PIF_VALUE: 25
PIF_VALUE: 23
PIF_VALUE: 23
PIF_VALUE: 26
PIF_VALUE: 23
PIF_VALUE: 25
PIF_VALUE: 25
PIF_VALUE: 22
PIF_VALUE: 24
PIF_VALUE: 3
PIF_VALUE: 25
PIF_VALUE: 24
PIF_VALUE: 7
PIF_VALUE: 24
PIF_VALUE: 26
PIF_VALUE: 11

## 2020-02-13 ASSESSMENT — PAIN SCALES - GENERAL
PAINLEVEL_OUTOF10: 10
PAINLEVEL_OUTOF10: 9
PAINLEVEL_OUTOF10: 7
PAINLEVEL_OUTOF10: 8
PAINLEVEL_OUTOF10: 8
PAINLEVEL_OUTOF10: 0
PAINLEVEL_OUTOF10: 9
PAINLEVEL_OUTOF10: 7
PAINLEVEL_OUTOF10: 0
PAINLEVEL_OUTOF10: 8

## 2020-02-13 ASSESSMENT — PAIN DESCRIPTION - LOCATION
LOCATION: BACK
LOCATION: BACK

## 2020-02-13 ASSESSMENT — PAIN DESCRIPTION - ONSET: ONSET: ON-GOING

## 2020-02-13 ASSESSMENT — PAIN DESCRIPTION - FREQUENCY: FREQUENCY: CONTINUOUS

## 2020-02-13 ASSESSMENT — PAIN DESCRIPTION - PAIN TYPE
TYPE: ACUTE PAIN;CHRONIC PAIN
TYPE: ACUTE PAIN;CHRONIC PAIN

## 2020-02-13 ASSESSMENT — PAIN DESCRIPTION - DESCRIPTORS: DESCRIPTORS: ACHING;NAGGING;PRESSURE

## 2020-02-13 ASSESSMENT — PAIN DESCRIPTION - PROGRESSION: CLINICAL_PROGRESSION: GRADUALLY IMPROVING

## 2020-02-13 ASSESSMENT — PAIN DESCRIPTION - ORIENTATION
ORIENTATION: LOWER
ORIENTATION: LOWER

## 2020-02-13 ASSESSMENT — PAIN - FUNCTIONAL ASSESSMENT: PAIN_FUNCTIONAL_ASSESSMENT: PREVENTS OR INTERFERES SOME ACTIVE ACTIVITIES AND ADLS

## 2020-02-13 NOTE — CARE COORDINATION
2/13/20, 9:39 AM    DISCHARGE ONGOING EVALUATION:     Xiomy Alvarez day: 1  Location: [facility] OR (General) POOL R* Reason for admit: Weakness [R53.1]  Cauda equina compression ([address] [G83.4]   Treatment Plan of Care: Pt taken to OR this morning for Cauda Equina. This CM report given to Fabiana Olvera RN CM on 4A as plan is for pt to be placed there post operatively  Barriers to Discharge: Medical readiness.   PCP: FABIAN Ross CNP  Readmission Risk Score: 12%

## 2020-02-13 NOTE — BRIEF OP NOTE
Brief Postoperative Note  ______________________________________________________________    Patient: Mayra Ellison  YOB: 1965  MRN: [id number]  Date of Procedure: 2/13/2020    Pre-Op Diagnosis: EXTREMITY WEAKNESS    Post-Op Diagnosis: Same       Procedure(s):  LUMBAR DECOMPRESSION, DISC REMOVAL BETWEEN L3-4, And extension of previous L4-S1 instrumentation and fusion L3. Anesthesia: General    Surgeon(s):  Camilo Oshea MD        Estimated Blood Loss (mL): 220     Complications: None    Specimens:   * No specimens in log *    Implants:  Implant Name Type Inv. Item Serial No.  Lot No. LRB No. Used   SREE-GRAFT DURAGEN SUTURABLE 2X2IN Bone/Graft/Tissue/Human/Synth SREE-GRAFT DURAGEN SUTURABLE 2X2IN  [employer] JEREMIAS 1263 [id number] N/A 1   IMPL SPINE GEL DOC 1C - [id number] Spine IMPL SPINE GEL Jonnathan Joy [id number] [employer] [facility]  N/A 1   Magnifuse Bone Graft x2   [address] [facility]  N/A 1         Drains:   Closed/Suction Drain Back Accordion (Active)       NG/OG/NJ/NE Tube Orogastric 18 fr Center mouth (Active)       Urethral Catheter Straight-tip; Non-latex 16 fr (Active)       [REMOVED] External Urinary Catheter (Removed)   Output (mL) 650 mL 2/12/2020  7:58 PM       Findings: severe stenosis and huge herniated disc at the level of L3-L4    Camilo Oshea MD  Date: 2/13/2020  Time: 2:05 PM

## 2020-02-13 NOTE — PLAN OF CARE
Problem: Falls - Risk of:  Goal: Will remain free from falls  Description  Will remain free from falls  Outcome: Ongoing  Note:   Fall precaution in place. Bed alarm/chair alarm. Bed locked in lowest position. Fall band applied if applicable. Call light and overhead table within reach. Will continue to monitor. Problem: Falls - Risk of:  Goal: Absence of physical injury  Description  Absence of physical injury  Outcome: Ongoing  Note:   Absence of physical injury. Problem: Pain:  Goal: Pain level will decrease  Description  Pain level will decrease  Outcome: Ongoing  Note:   Pt denies pain on my shift. Non pharmaceutical interventions like ice and repositioning offered before pain medications. Will continue to assess. Problem: Pain:  Goal: Control of acute pain  Description  Control of acute pain  Outcome: Ongoing  Note:   Pt denies pain on my shift. Non pharmaceutical interventions like ice and repositioning offered before pain medications. Will continue to assess. Problem: Pain:  Goal: Control of chronic pain  Description  Control of chronic pain  Outcome: Ongoing  Note:   Pt denies pain on my shift. Non pharmaceutical interventions like ice and repositioning offered before pain medications. Will continue to assess. Problem: Risk for Impaired Skin Integrity  Goal: Tissue integrity - skin and mucous membranes  Description  Structural intactness and normal physiological function of skin and  mucous membranes. Outcome: Ongoing  Note:   No new skin integrity issues on my shift. Will continue to assess. Problem: Nutrition  Goal: Optimal nutrition therapy  2/12/2020 0956 by Matthew Amaro RN  Outcome: Ongoing  Note:   Pt is to be nothing to eat or drink at midnight for procedure in the AM.   Pt verbalizes understanding of care plan. Pt contributes to goal settings.

## 2020-02-13 NOTE — ANESTHESIA PRE PROCEDURE
MG per tablet Take 1 tablet by mouth daily 9/16/19   Mallory Pimentel, APRN - CNP       Current medications:    Current Facility-Administered Medications   Medication Dose Route Frequency Provider Last Rate Last Dose    folic acid (FOLVITE) tablet 1 mg  1 mg Oral Daily Ellyn Tarango MD   1 mg at 02/12/20 1342    sodium chloride flush 0.9 % injection 10 mL  10 mL Intravenous 2 times per day Clerance Mingle, DO   10 mL at 02/12/20 0930    sodium chloride flush 0.9 % injection 10 mL  10 mL Intravenous PRN Clerance Mingle, DO        magnesium hydroxide (MILK OF MAGNESIA) 400 MG/5ML suspension 30 mL  30 mL Oral Daily PRN Clerance Mingle, DO        ondansetron [facility]) injection 4 mg  4 mg Intravenous Q6H PRN Clerance Mingle, DO        enoxaparin (LOVENOX) injection 40 mg  40 mg Subcutaneous Daily Clerance Mingle, DO   40 mg at 02/12/20 8757    acetaminophen (TYLENOL) tablet 650 mg  650 mg Oral Q4H PRN Clerance Mingle, DO        HYDROcodone-acetaminophen (NORCO) 7.5-325 MG per tablet 1 tablet  1 tablet Oral Q6H PRN Clerance Mingle, DO        lidocaine 4 % external patch 1 patch  1 patch Transdermal Daily Clerance Mingle, DO   1 patch at 02/12/20 2823    orphenadrine (NORFLEX) injection 60 mg  60 mg Intramuscular Q12H Clerance Mingle, DO   60 mg at 02/12/20 2359    0.9 % sodium chloride infusion   Intravenous Continuous Clerance Mingle,  mL/hr at 02/12/20 1338      atorvastatin (LIPITOR) tablet 20 mg  20 mg Oral Daily Clerance Mingle, DO   20 mg at 02/12/20 5895    calcium-vitamin D (OSCAL-500) 500-200 MG-UNIT per tablet 1 tablet  1 tablet Oral Daily Clerance Mingle, DO   1 tablet at 02/12/20 4812    pantoprazole (PROTONIX) tablet 40 mg  40 mg Oral QAM AC Toña Elizabeth, DO   40 mg at 02/13/20 3363    sulfaSALAzine (AZULFIDINE) tablet 1,000 mg  1,000 mg Oral BID Clerance Mingle, DO   1,000 mg at 02/12/20 2043    predniSONE (DELTASONE) tablet 15 mg  15 mg Oral Daily Toña Elizabeth DO   15 mg at 02/12/20 0928    azithromycin (ZITHROMAX) 500 mg in D5W 250ml addavial  500 mg Intravenous Q24H Tavon Gift, DO   Stopped at 02/13/20 0145    cefTRIAXone (ROCEPHIN) 1 g IVPB in 50 mL D5W minibag  1 g Intravenous Q24H Donie Gift, DO   Stopped at 02/13/20 0102       Allergies: Allergies   Allergen Reactions    Remicade [Infliximab] Shortness Of Breath       Problem List:    Patient Active Problem List   Diagnosis Code    Uvulitis K12.2    Leukocytosis D72.829    Hypokalemia E87.6    History of rheumatoid arthritis Z87.39    GERD (gastroesophageal reflux disease) K21.9    Chronic use of steroids [id number]    Dysphagia R13.10    Tobacco abuse Z72.0    Esophageal dysmotility K22.4    Back pain, chronic M54.9, G89.29    Essential hypertension I10    Rheumatoid arthritis (HCC) M06.9    Weakness R53.1    Anterior thigh numbness R20.0    Intractable back pain M54.9    Decreased activities of daily living (ADL) Z78.9    Anemia D64.9    Elevated C-reactive protein (CRP) R79.82    Elevated sed rate R70.0    Lung infiltrate R91.8    Lumbar nerve root impingement M54.16    Cauda equina compression ([facility]) G83.4       Past Medical History:        Diagnosis Date    Arthritis pain     Blood circulation, collateral     both arms--related to RA    Difficulty in swallowing 2013    DVT (deep venous thrombosis) ([facility])     Esophageal dysmotility 5/10/2013    Essential hypertension 4/23/2018    GERD (gastroesophageal reflux disease)     Hemorrhoids     Osteoarthritis     Osteoarthritis     Pneumonia     [facility]umat[facility] [address]     Tobacco abuse 4/20/2013       Past Surgical History:        Procedure Laterality Date    BACK SURGERY  1999 ?     ECTOPIC PREGNANCY SURGERY      ENDOMETRIAL ABLATION      OTHER SURGICAL HISTORY Right 05/29/2013    Robotic R Paraspinal mass resection    TUBAL LIGATION      WRIST FUSION      rt    WRIST SURGERY      lt       Social History:    Social History Tobacco Use    Smoking status: Former Smoker     Packs/day: 0.50     Years: 15.00     Pack years: 7.50     Types: Cigarettes     Last attempt to quit: 2013     Years since quittin.7    Smokeless tobacco: Never Used    Tobacco comment: restarted  smokes 1-2 cigs daily using patches   Substance Use Topics    Alcohol use: Yes     Comment: social                                Counseling given: Not Answered  Comment: restarted  smokes 1-2 cigs daily using patches      Vital Signs (Current):   Vitals:    20 1746 20 2015 20 0000 20 0400   BP:  119/64 102/68 102/61   Pulse:  75 66 72   Resp:  16 16 16   Temp:  97.8 °F (36.6 °C) 97.7 °F (36.5 °C) 97.9 °F (36.6 °C)   TempSrc:  Oral Oral Oral   SpO2: 98% 97% 98% 98%   Weight:       Height:                                                  BP Readings from Last 3 Encounters:   20 102/61   19 128/80   08/10/19 137/86       NPO Status:                                                                                 BMI:   Wt Readings from Last 3 Encounters:   20 170 lb (77.1 kg)   19 194 lb 11.2 oz (88.3 kg)   08/10/19 198 lb (89.8 kg)     Body mass index is 26.63 kg/m². CBC:   Lab Results   Component Value Date    WBC 9.9 2020    RBC 3.39 2020    RBC 4.43 2019    HGB 9.8 2020    HCT 32.1 2020    MCV 94.7 2020    RDW 15.1 2019     2020       CMP:   Lab Results   Component Value Date     2020    K 3.8 2020     2020    CO2 21 2020    BUN 7 2020    CREATININE 0.4 2020    LABGLOM >90 2020    GLUCOSE 111 2020    GLUCOSE 103 2019    PROT 8.5 2020    CALCIUM 9.1 2020    BILITOT 0.5 2020    ALKPHOS 75 2020    AST 19 2020    ALT 10 2020       POC Tests: No results for input(s): POCGLU, POCNA, POCK, POCCL, POCBUN, POCHEMO, POCHCT in the last 72 hours.     Coags:   Lab

## 2020-02-13 NOTE — PROGRESS NOTES
Hospitalist Progress Note      Patient:  Luis Manuel Rodriguez    Unit/Bed:4A-03/003-A  YOB: 1965  MRN: [id number]   Acct: [de-identified]   PCP: FABIAN Golden CNP  Date of Admission: 2/11/2020    Assessment/Plan:    1. Severe spinal canal stenosis with compression of the cauda equina at the level of L3-L4: Pt had surgery on 2/13 & tolerated it well. PT/OT. Neurosurgery following. 2. Bilateral lower legs weakness: Likely secondary to #1. Continue Danube.  Neurosurgery team to manage pain management. 3. Degenerative disease lumbar multilevel: Supportive treatment  4. Active rheumatoid arthritis currently on DMARDs: Chronically on sulfasalazine and prednisone with methotrexate but not taking folic acid currently. Restart folic acid 1 mg daily. Folate level 9 on the lower side. Continue Os-Donald to prevent prednisone induced osteoporosis. 5. Bibasilar interstitial infiltrates: CT abdomen/pelvis documented this finding, patient started on ceftriaxone and azithromycin for assumed CAP. Procal 0.04, will DC ABX. Will keep Ancef for post-op.        Chief Complaint: Back Pain     Initial H and P:-    62 y.o. female who presented to [address] [place] [address] with complaints of lower extremity weakness. Thighs are reported to be numb and painful. Symptoms located bilateral anterior thighs. Patient endorses lumbar pain and is having difficulty ambulatin x 2-3 weeks. She denies injury. She reports history of lumbar fusion in the past. She notes that she is not having any bowel or bladder incontinence.  She denies chest pain or shortness of breath. Denies nausea, vomiting, diarrhea or constipation. Denies fevers. Endorses chills. Patient has history of RA and is on steroids at home. Subjective (past 24 hours):   Pt underwent surgery and tolerated it well. Pt has no new complaints. Neurosurgery evaluated on 4A.      Past medical history, family history, social Skin: Skin color, texture, turgor normal.  No rashes or lesions. Neurologic:  Neurovascularly intact without any focal sensory/motor deficits. Cranial nerves: II-XII intact, grossly non-focal.  Psychiatric: Alert and oriented, thought content appropriate, normal insight  Capillary Refill: Brisk,< 3 seconds   Peripheral Pulses: +2 palpable, equal bilaterally     Labs:   Recent Labs     02/11/20 1925 02/12/20 0415 02/13/20  0646 02/13/20  1421   WBC 7.0 6.9 9.9  --    HGB 11.3* 10.7* 9.8* 7.9*   HCT 36.4* 35.0* 32.1*  --    * 476* 483*  --      Recent Labs     02/11/20 1925 02/12/20 0415 02/13/20  0646 02/13/20  1419    135 144 145   K 3.5 3.9 3.8 3.8   CL 98 101 111  --    CO2 21* 22* 21*  --    BUN 5* 4* 7  --    CREATININE 0.5 0.5 0.4  --    CALCIUM 9.6 9.4 9.1  --      Recent Labs     02/11/20 1925   AST 19   ALT 10*   BILIDIR <0.2   BILITOT 0.5   ALKPHOS 75     Recent Labs     02/11/20 1925   INR 1.23*     Recent Labs     02/11/20 1925   CKTOTAL 212*       Microbiology:    Blood culture #1:   Lab Results   Component Value Date    BC No growth-preliminary  02/11/2020     Urinalysis:      Lab Results   Component Value Date    NITRU NEGATIVE 02/11/2020    WBCUA 0-2 02/11/2020    BACTERIA NONE SEEN 02/11/2020    RBCUA 3-5 02/11/2020    BLOODU NEGATIVE 02/11/2020    SPECGRAV 1.029 05/24/2013    GLUCOSEU NEGATIVE 02/11/2020       Radiology:  XR LUMBAR SPINE (2-3 VIEWS)   Final Result   1. Posterior lumbar fusion revision is demonstrated. Please refer to operative report for further details. **This report has been created using voice recognition software. It may contain minor errors which are inherent in voice recognition technology. **      Final report electronically signed by Dr. Dakota Pickering on 2/13/2020 2:33 PM      FLUORO FOR SURGICAL PROCEDURES   Final Result      MRI LUMBAR SPINE WO CONTRAST   Final Result       1.  Severe spinal canal stenosis with compression of the cauda equina L5-S1 discectomies with disc prostheses and pedicle screw fusion and L4 and L5 laminectomies. No fracture or subluxation. Moderate L3-4 degenerative disc disease. L3-4 possible annular bulge and moderate spinal stenosis with probable impingement upon the right L3 nerve root. See the accompanying abdomen pelvis CT report for discussion of nonspine findings. **This report has been created using voice recognition software. It may contain minor errors which are inherent in voice recognition technology. **         Final report electronically signed by Dr. Annabel Cheadle on 2/11/2020 8:56 PM      CT HEAD WO CONTRAST   Final Result      No acute ischemic infarct, hemorrhage, or mass effect. **This report has been created using voice recognition software. It may contain minor errors which are inherent in voice recognition technology. **      Final report electronically signed by Dr. Annabel Cheadle on 2/11/2020 8:50 PM      XR CHEST PORTABLE   Final Result      No acute cardiopulmonary disease. **This report has been created using voice recognition software. It may contain minor errors which are inherent in voice recognition technology. **      Final report electronically signed by Dr. Annabel Cheadle on 2/11/2020 7:52 PM        Electronically signed by JEREMIAH Ovalle on 2/13/2020 at 5:36 PM

## 2020-02-13 NOTE — PROGRESS NOTES
Patient was Seen and examined in the pre op area in conjunction with neurosurgery PA(Cisco Baez). There  is no significant change in patient history and physical exam comparing with yesterday    - I discussed today again with patient and her  today planned surgical intervention in detail. I discussed with them also the potential risks in front of  neurosurgery PA Joann Duarte). - All questions and concerns were answered and addressed. - Patient and her  agreed again to proceeded with today planned neurosurgical intervention.

## 2020-02-13 NOTE — ANESTHESIA PROCEDURE NOTES
Airway  Date/Time: 2/13/2020 8:17 AM  Urgency: elective    Airway not difficult    General Information and Staff    Patient location during procedure: OR  Anesthesiologist: Cameron Diaz DO  Resident/CRNA: FABIAN Bradshaw - CRNA  Performed: anesthesiologist     Indications and Patient Condition  Indications for airway management: anesthesia  Spontaneous ventilation: present  Sedation level: deep  Preoxygenated: yes  Patient position: sniffing  MILS not maintained throughout  Mask difficulty assessment: vent by bag mask    Final Airway Details  Final airway type: endotracheal airway      Successful airway: ETT  Cuffed: yes   Successful intubation technique: direct laryngoscopy (Jolly)  Facilitating devices/methods: intubating stylet  Endotracheal tube insertion site: oral  Blade: Paige  Blade size: #3  ETT size (mm): 7.5  Cormack-Lehane Classification: grade I - full view of glottis  Placement verified by: chest auscultation, capnometry and palpation of cuff   Inital cuff pressure (cm H2O): 8  Measured from: lips  ETT to lips (cm): 23  Number of attempts at approach: 1  Number of other approaches attempted: 0    no

## 2020-02-13 NOTE — PROGRESS NOTES
1459 Pt transferred to PACU, see flow sheet for assessment. 1520 BELEN. Dong rounding and checked pt.   1535 BELEN. Terese at bedside, assessing pt -order received for Toradol. 1544 Pt repositioned to her side, pillows for comfort. 1559 Pt tolerating water and lip moisturizer applied. Pt rating her back pain 8/10. After conversation, pt dozed back to sleep. 1621 Report called to Luciano Gonzáles, 4A. Waiting room updated. 1631 Pt transferred to 4A.

## 2020-02-14 LAB
ERYTHROCYTE [DISTWIDTH] IN BLOOD BY AUTOMATED COUNT: 14.6 % (ref 11.5–14.5)
ERYTHROCYTE [DISTWIDTH] IN BLOOD BY AUTOMATED COUNT: 51.5 FL (ref 35–45)
HCT VFR BLD CALC: 27.8 % (ref 37–47)
HEMOGLOBIN: 8.4 GM/DL (ref 12–16)
MCH RBC QN AUTO: 29 PG (ref 26–33)
MCHC RBC AUTO-ENTMCNC: 30.2 GM/DL (ref 32.2–35.5)
MCV RBC AUTO: 95.9 FL (ref 81–99)
PLATELET # BLD: 412 THOU/MM3 (ref 130–400)
PMV BLD AUTO: 9.8 FL (ref 9.4–12.4)
RBC # BLD: 2.9 MILL/MM3 (ref 4.2–5.4)
WBC # BLD: 7.9 THOU/MM3 (ref 4.8–10.8)

## 2020-02-14 PROCEDURE — 99024 POSTOP FOLLOW-UP VISIT: CPT | Performed by: PHYSICIAN ASSISTANT

## 2020-02-14 PROCEDURE — 6370000000 HC RX 637 (ALT 250 FOR IP): Performed by: NEUROLOGICAL SURGERY

## 2020-02-14 PROCEDURE — 2580000003 HC RX 258: Performed by: NEUROLOGICAL SURGERY

## 2020-02-14 PROCEDURE — 36415 COLL VENOUS BLD VENIPUNCTURE: CPT

## 2020-02-14 PROCEDURE — 6360000002 HC RX W HCPCS: Performed by: NEUROLOGICAL SURGERY

## 2020-02-14 PROCEDURE — 94761 N-INVAS EAR/PLS OXIMETRY MLT: CPT

## 2020-02-14 PROCEDURE — 99232 SBSQ HOSP IP/OBS MODERATE 35: CPT | Performed by: INTERNAL MEDICINE

## 2020-02-14 PROCEDURE — 2700000000 HC OXYGEN THERAPY PER DAY

## 2020-02-14 PROCEDURE — 2060000000 HC ICU INTERMEDIATE R&B

## 2020-02-14 PROCEDURE — 2709999900 HC NON-CHARGEABLE SUPPLY

## 2020-02-14 PROCEDURE — 85027 COMPLETE CBC AUTOMATED: CPT

## 2020-02-14 PROCEDURE — 97110 THERAPEUTIC EXERCISES: CPT

## 2020-02-14 PROCEDURE — 84238 ASSAY NONENDOCRINE RECEPTOR: CPT

## 2020-02-14 PROCEDURE — 97166 OT EVAL MOD COMPLEX 45 MIN: CPT

## 2020-02-14 PROCEDURE — 6370000000 HC RX 637 (ALT 250 FOR IP): Performed by: INTERNAL MEDICINE

## 2020-02-14 RX ORDER — POLYETHYLENE GLYCOL 3350 17 G/17G
17 POWDER, FOR SOLUTION ORAL DAILY
Status: DISCONTINUED | OUTPATIENT
Start: 2020-02-14 | End: 2020-02-21 | Stop reason: HOSPADM

## 2020-02-14 RX ORDER — DOCUSATE SODIUM 100 MG/1
200 CAPSULE, LIQUID FILLED ORAL DAILY
Status: DISCONTINUED | OUTPATIENT
Start: 2020-02-14 | End: 2020-02-21 | Stop reason: HOSPADM

## 2020-02-14 RX ORDER — SENNA PLUS 8.6 MG/1
1 TABLET ORAL 2 TIMES DAILY
Status: DISCONTINUED | OUTPATIENT
Start: 2020-02-14 | End: 2020-02-21 | Stop reason: HOSPADM

## 2020-02-14 RX ORDER — KETOROLAC TROMETHAMINE 30 MG/ML
INJECTION, SOLUTION INTRAMUSCULAR; INTRAVENOUS
Status: DISPENSED
Start: 2020-02-14 | End: 2020-02-15

## 2020-02-14 RX ADMIN — HYDROCODONE BITARTRATE AND ACETAMINOPHEN 1 TABLET: 7.5; 325 TABLET ORAL at 16:28

## 2020-02-14 RX ADMIN — SENNOSIDES 8.6 MG: 8.6 TABLET, FILM COATED ORAL at 12:01

## 2020-02-14 RX ADMIN — SULFASALAZINE 1000 MG: 500 TABLET ORAL at 08:17

## 2020-02-14 RX ADMIN — PANTOPRAZOLE SODIUM 40 MG: 40 TABLET, DELAYED RELEASE ORAL at 05:14

## 2020-02-14 RX ADMIN — KETOROLAC TROMETHAMINE 15 MG: 30 INJECTION, SOLUTION INTRAMUSCULAR at 16:29

## 2020-02-14 RX ADMIN — DOCUSATE SODIUM 200 MG: 100 CAPSULE, LIQUID FILLED ORAL at 12:29

## 2020-02-14 RX ADMIN — POLYETHYLENE GLYCOL 3350 17 G: 17 POWDER, FOR SOLUTION ORAL at 12:29

## 2020-02-14 RX ADMIN — MORPHINE SULFATE 2 MG: 2 INJECTION, SOLUTION INTRAMUSCULAR; INTRAVENOUS at 12:49

## 2020-02-14 RX ADMIN — PREDNISONE 15 MG: 10 TABLET ORAL at 08:18

## 2020-02-14 RX ADMIN — SULFASALAZINE 1000 MG: 500 TABLET ORAL at 21:45

## 2020-02-14 RX ADMIN — SENNOSIDES 8.6 MG: 8.6 TABLET, FILM COATED ORAL at 21:45

## 2020-02-14 RX ADMIN — DEXTROSE MONOHYDRATE 2 G: 50 INJECTION, SOLUTION INTRAVENOUS at 05:08

## 2020-02-14 RX ADMIN — ATORVASTATIN CALCIUM 20 MG: 20 TABLET, FILM COATED ORAL at 08:17

## 2020-02-14 RX ADMIN — ORPHENADRINE CITRATE 60 MG: 30 INJECTION INTRAMUSCULAR; INTRAVENOUS at 12:29

## 2020-02-14 RX ADMIN — DEXTROSE MONOHYDRATE 2 G: 50 INJECTION, SOLUTION INTRAVENOUS at 21:34

## 2020-02-14 RX ADMIN — DEXTROSE MONOHYDRATE 2 G: 50 INJECTION, SOLUTION INTRAVENOUS at 12:30

## 2020-02-14 RX ADMIN — ORPHENADRINE CITRATE 60 MG: 30 INJECTION INTRAMUSCULAR; INTRAVENOUS at 00:45

## 2020-02-14 RX ADMIN — SODIUM CHLORIDE: 9 INJECTION, SOLUTION INTRAVENOUS at 05:14

## 2020-02-14 RX ADMIN — HYDROCODONE BITARTRATE AND ACETAMINOPHEN 1 TABLET: 7.5; 325 TABLET ORAL at 08:17

## 2020-02-14 RX ADMIN — Medication 10 ML: at 21:36

## 2020-02-14 RX ADMIN — CALCIUM CARBONATE-VITAMIN D TAB 500 MG-200 UNIT 1 TABLET: 500-200 TAB at 08:17

## 2020-02-14 ASSESSMENT — PAIN DESCRIPTION - FREQUENCY
FREQUENCY: INTERMITTENT
FREQUENCY: CONTINUOUS
FREQUENCY: INTERMITTENT
FREQUENCY: CONTINUOUS

## 2020-02-14 ASSESSMENT — PAIN SCALES - GENERAL
PAINLEVEL_OUTOF10: 10
PAINLEVEL_OUTOF10: 7
PAINLEVEL_OUTOF10: 9
PAINLEVEL_OUTOF10: 10
PAINLEVEL_OUTOF10: 7
PAINLEVEL_OUTOF10: 5
PAINLEVEL_OUTOF10: 9
PAINLEVEL_OUTOF10: 7
PAINLEVEL_OUTOF10: 5
PAINLEVEL_OUTOF10: 7
PAINLEVEL_OUTOF10: 7
PAINLEVEL_OUTOF10: 5
PAINLEVEL_OUTOF10: 7
PAINLEVEL_OUTOF10: 4

## 2020-02-14 ASSESSMENT — PAIN DESCRIPTION - LOCATION
LOCATION: BACK

## 2020-02-14 ASSESSMENT — PAIN DESCRIPTION - PROGRESSION
CLINICAL_PROGRESSION: GRADUALLY IMPROVING
CLINICAL_PROGRESSION: GRADUALLY IMPROVING

## 2020-02-14 ASSESSMENT — PAIN DESCRIPTION - ONSET
ONSET: ON-GOING

## 2020-02-14 ASSESSMENT — PAIN DESCRIPTION - PAIN TYPE
TYPE: ACUTE PAIN;CHRONIC PAIN
TYPE: ACUTE PAIN
TYPE: ACUTE PAIN;CHRONIC PAIN
TYPE: SURGICAL PAIN

## 2020-02-14 ASSESSMENT — PAIN DESCRIPTION - ORIENTATION
ORIENTATION: LOWER
ORIENTATION: MID;LOWER
ORIENTATION: MID;LOWER
ORIENTATION: LOWER;MID
ORIENTATION: LOWER
ORIENTATION: LOWER;MID

## 2020-02-14 ASSESSMENT — PAIN - FUNCTIONAL ASSESSMENT
PAIN_FUNCTIONAL_ASSESSMENT: PREVENTS OR INTERFERES SOME ACTIVE ACTIVITIES AND ADLS

## 2020-02-14 ASSESSMENT — PAIN DESCRIPTION - DESCRIPTORS
DESCRIPTORS: SHARP;ACHING
DESCRIPTORS: ACHING;PRESSURE
DESCRIPTORS: PRESSURE;CRAMPING
DESCRIPTORS: PRESSURE;CRAMPING

## 2020-02-14 NOTE — PLAN OF CARE
Problem: Falls - Risk of:  Goal: Will remain free from falls  Description  Will remain free from falls  Outcome: Ongoing  Note:   Patient on bedrest during this shift. Patient educated and encouraged to use call light for assistance from staff with needs. Bed wheels locked and in lowest position. Pathways clear; Possessions within reach     Problem: Falls - Risk of:  Goal: Absence of physical injury  Description  Absence of physical injury  Outcome: Ongoing  Note:   Patient on bedrest during this shift. Patient educated and encouraged to use call light for assistance from staff with needs. Bed wheels locked and in lowest position. Pathways clear; Possessions within reach     Problem: Pain:  Goal: Pain level will decrease  Description  Pain level will decrease  Outcome: Ongoing  Note:   Patient complains of surgical pain during this shift. PRN pain meds administered. Will continue to monitor. Problem: Pain:  Goal: Control of acute pain  Description  Control of acute pain  Outcome: Ongoing  Note:   Patient complains of surgical pain during this shift. PRN pain meds administered. Will continue to monitor. Problem: Pain:  Goal: Control of chronic pain  Description  Control of chronic pain  Outcome: Ongoing  Note:   Patient complains of surgical pain during this shift. PRN pain meds administered. Will continue to monitor. Problem: Risk for Impaired Skin Integrity  Goal: Tissue integrity - skin and mucous membranes  Description  Structural intactness and normal physiological function of skin and  mucous membranes. Outcome: Ongoing  Note:   Patient to lay flat due to surgical procedure; Assisted to make slight adjustments to positioning as needed. Continuous IV fluids running. Will continue to monitor. Problem: Nutrition  Goal: Optimal nutrition therapy  Outcome: Ongoing  Note:   Patient has no nutritional intake during this shift. Continuous IV fluids running.  Will continue to monitor    Care plan reviewed with patient. Patient verbalizes understanding of the plan of care and contributes to goal setting.

## 2020-02-14 NOTE — PROGRESS NOTES
right cheek since surgery yesterday. No throat or tongue issues. Medications:  Reviewed    Infusion Medications    sodium chloride 100 mL/hr at 02/14/20 0514     Scheduled Medications    sodium chloride flush  10 mL Intravenous 2 times per day    ceFAZolin (ANCEF) IVPB  2 g Intravenous Q8H    losartan  50 mg Oral Daily    And    hydrochlorothiazide  12.5 mg Oral Daily    lidocaine  1 patch Transdermal Daily    orphenadrine  60 mg Intramuscular Q12H    atorvastatin  20 mg Oral Daily    calcium-vitamin D  1 tablet Oral Daily    pantoprazole  40 mg Oral QAM AC    sulfaSALAzine  1,000 mg Oral BID    predniSONE  15 mg Oral Daily     PRN Meds: sodium chloride flush, ondansetron, cyclobenzaprine, morphine, ketorolac, magnesium hydroxide, acetaminophen, HYDROcodone-acetaminophen      Intake/Output Summary (Last 24 hours) at 2/14/2020 4216  Last data filed at 2/14/2020 0427  Gross per 24 hour   Intake 7050 ml   Output 2710 ml   Net 4340 ml     Weight change:       Exam:  BP (!) 110/56   Pulse 69   Temp 98 °F (36.7 °C) (Oral)   Resp 16   Ht 5' 7\" (1.702 m)   Wt 170 lb (77.1 kg)   SpO2 100%   BMI 26.63 kg/m²     General appearance: No apparent distress, well developed, appears stated age. Eyes:  Pupils equal, round, and reactive to light. Conjunctivae/corneas clear. HENT: Head normal in appearance. External nares normal.  Oral mucosa moist without lesions. Hearing grossly intact. Neck: Supple, with full range of motion. No jugular venous distention. Trachea midline. Respiratory:  Normal respiratory effort. Clear to auscultation, bilaterally without rales or wheezes or Rhonchi. Cardiovascular: Normal rate, regular rhythm with normal S1/S2 without murmurs. No lower extremity edema. Abdomen: Soft,  with normal bowel sounds. Mildly distended and tender in epigastrum. Musculoskeletal: Normal range of motion in extremities.   Evidence of arthritis most notably in bilateral hands Back bandage and the L3-4 level due to loss of disc height and a large central/left central disc protrusion which migrates superiorly. There are also facet degenerative changes at this level. There    is moderate severity bilateral foraminal stenosis. 2. Moderate severity bilateral foraminal stenosis at the L5-S1 level and moderate severity right foraminal stenosis at the L4-5 level. These findings were telephoned to [facility] the patient's nurse on 8A at [address] AM on 2/12/2020 . **This report has been created using voice recognition software. It may contain minor errors which are inherent in voice recognition technology. **      Final report electronically signed by Dr. Rut Massey on 2/12/2020 8:12 AM      MRI BRAIN WO CONTRAST   Final Result      1. No acute findings. 2. Minimal severity chronic small vessel ischemic changes. **This report has been created using voice recognition software. It may contain minor errors which are inherent in voice recognition technology. **      Final report electronically signed by Dr. Rut Massey on 2/12/2020 7:44 AM      CT ABDOMEN PELVIS W IV CONTRAST Additional Contrast? None   Final Result   No acute traumatic abnormality of the solid or hollow viscera of the abdomen and pelvis. No acute inflammatory or infectious process in the abdomen or pelvis. Colonic diverticulosis without evidence of diverticulitis. No evidence of bowel obstruction. Mild bilateral external iliac and inguinal probably reactive adenopathy. Postoperative changes of the lumbar spine, see above. Bibasilar interstitial infiltrates, acute versus chronic, see above. **This report has been created using voice recognition software. It may contain minor errors which are inherent in voice recognition technology. **      Final report electronically signed by Dr. Agustina Martínez on 2/11/2020 9:04 PM      CT LUMBAR RECONSTRUCTION WO POST PROCESS   Final Result   Status post L4-5 and L5-S1 discectomies with disc prostheses and pedicle screw fusion and L4 and L5 laminectomies. No fracture or subluxation. Moderate L3-4 degenerative disc disease. L3-4 possible annular bulge and moderate spinal stenosis with probable impingement upon the right L3 nerve root. See the accompanying abdomen pelvis CT report for discussion of nonspine findings. **This report has been created using voice recognition software. It may contain minor errors which are inherent in voice recognition technology. **         Final report electronically signed by Dr. Cristofer Garcia on 2/11/2020 8:56 PM      CT HEAD WO CONTRAST   Final Result      No acute ischemic infarct, hemorrhage, or mass effect. **This report has been created using voice recognition software. It may contain minor errors which are inherent in voice recognition technology. **      Final report electronically signed by Dr. Cristofer Garcia on 2/11/2020 8:50 PM      XR CHEST PORTABLE   Final Result      No acute cardiopulmonary disease. **This report has been created using voice recognition software. It may contain minor errors which are inherent in voice recognition technology. **      Final report electronically signed by Dr. Cristofer Garcia on 2/11/2020 7:52 PM          DVT prophylaxis: [] Lovenox                                  [x] SCDs                                 [] SQ Heparin                                 [] Encourage ambulation           [] Already on Anticoagulation     Code Status: Full Code    PT/OT Eval Status: yes    Diet:   DIET LOW SODIUM 2 GM; Carb Control: 4 carb choices (60 gms)/meal  Dietary Nutrition Supplements: Low Calorie High Protein Supplement    Fluids: cut down to 75/hr    Tele:   [x] yes             [] no      Electronically signed by Letha Tovar DO on 2/14/2020 at 7:02 AM

## 2020-02-14 NOTE — PROGRESS NOTES
Pt in supine position in bed. Pupils round, equal, and reactive to light. Speech is clear and appropriate. Skin is warm and dry. Heart sounds regular. Lung sounds clear. Chest expansion symmetrical.  Bowel sounds active X4. Abdomen is soft, round, and tender.  Hand grasp strength strong and equal. Pedal pulses strong and equal. Pedal push and pull moderate and equal.

## 2020-02-14 NOTE — PROGRESS NOTES
[address]  INPATIENT OCCUPATIONAL THERAPY  [facility] NEUROSCIENCES 4A  EVALUATION    Time:    Time In: 1500  Time Out: 1525  Timed Code Treatment Minutes: 15 Minutes  Minutes: 25          Date: 2020  Patient Name: Chantel Spain,   Gender: female      MRN: [id number]  : 1965  (62 y.o.)  Referring Practitioner: Kevin Clarke MD  Diagnosis: Extremity weakness  Additional Pertinent Hx: Chantel Spain is a 62 y.o. female whopresents to the emergency department from home for fatigue and extremity weakness secondary to back pain. Patient reports that her back pain shoots into her legs. She reports trouble with movement and getting up. Patient reports a history of similar back pain. She states that she has had 2 back surgeries in the past. Patient is on medications for her chronic pain, which she does take regularly. S/P LUMBAR DECOMPRESSION, DISC REMOVAL BETWEEN L3-4, And extension of previous L4-S1 instrumentation and fusion L3.on 2020    Restrictions/Precautions:  Restrictions/Precautions: Fall Risk, General Precautions  Position Activity Restriction  Spinal Precautions: No Bending, No Lifting, No Twisting    Subjective  Chart Reviewed: Yes, Orders, Imaging, History and Physical, Operative Notes, Progress Notes  Patient assessed for rehabilitation services?: Yes    Subjective: Pt expressin her fear of getting up afraid to fall.      Pain:  Pain Assessment  Patient Currently in Pain: Yes  Pain Assessment: 0-10  Pain Level: 7  Pain Type: Acute pain  Pain Location: Back  Pain Descriptors: (tensing )  Pain Frequency: Intermittent    Social/Functional History:  Lives With: Spouse  Type of Home: House  Home Layout: One level  Home Access: Stairs to enter with rails  Entrance Stairs - Number of Steps: 1 platform step   Home Equipment: Cane   Bathroom Shower/Tub: Tub/Shower unit(possibly has a shower chair )  Bathroom Toilet: Standard  Bathroom Equipment: Toilet raiser(she thinks she has )  Bathroom

## 2020-02-14 NOTE — PROGRESS NOTES
Neurosurgery Progress Note    Patient:  Areta Libman      Unit/Bed:4A-03/003-A    YOB: 1965    MRN: [id number]     Acct: [de-identified]     Admit date: 2/11/2020    Chief Complaint   Patient presents with    Fatigue    Extremity Weakness       Patient Seen, Chart, Physician notes, Labs, Radiology studies reviewed. Subjective: Is postoperative day #1 from reexploration and revision of lumbar fusion with extension to lumbar 3 performed by Dr. Bon Price, without complication. She is comfortable this morning on exam with pain well-controlled. Past, Family, Social History unchanged from admission. Diet:  DIET LOW SODIUM 2 GM; Carb Control: 4 carb choices (60 gms)/meal  Dietary Nutrition Supplements: Low Calorie High Protein Supplement    Medications:  Scheduled Meds:   senna  1 tablet Oral BID    docusate sodium  200 mg Oral Daily    polyethylene glycol  17 g Oral Daily    sodium chloride flush  10 mL Intravenous 2 times per day    ceFAZolin (ANCEF) IVPB  2 g Intravenous Q8H    [Held by provider] losartan  50 mg Oral Daily    And    [Held by provider] hydrochlorothiazide  12.5 mg Oral Daily    lidocaine  1 patch Transdermal Daily    orphenadrine  60 mg Intramuscular Q12H    atorvastatin  20 mg Oral Daily    calcium-vitamin D  1 tablet Oral Daily    pantoprazole  40 mg Oral QAM AC    sulfaSALAzine  1,000 mg Oral BID    predniSONE  15 mg Oral Daily     Continuous Infusions:   sodium chloride 100 mL/hr at 02/14/20 0514     PRN Meds:sodium chloride flush, ondansetron, cyclobenzaprine, morphine, ketorolac, magnesium hydroxide, acetaminophen, HYDROcodone-acetaminophen    Objective: Resting in bed comfortably following revision of her lumbar fusion with extension to lumbar 3 performed by Dr. Bon Price yesterday, without complication. She demonstrates marked improvement for lower extremity strength and sensation bilaterally.   Her incision is dry with dressings intact and her drain is intact and Lumbar Spine (2-3 Views)    Result Date: 2/13/2020  PROCEDURE: XR LUMBAR SPINE (2-3 VIEWS) CLINICAL INFORMATION: surgery COMPARISON: 10/20/2014 TECHNIQUE:  10 fluoroscopic intraoperative views of the lumbar spine  were obtained. FINDINGS: Fluoroscopic intraoperative views of the lumbar spine depict revision of posterior lower lumbar fusion. Images demonstrate a surgical instrument directed towards the intervertebral disc space at L3-L4. The final image demonstrates posterior fusion hardware extending from L3 through S1. Fluoro Time: 1 min 19 secs Mode of Operation: norm Performing Physician:Dr [email or web address]     1. Posterior lumbar fusion revision is demonstrated. Please refer to operative report for further details. **This report has been created using voice recognition software. It may contain minor errors which are inherent in voice recognition technology. ** Final report electronically signed by Dr. Robin Simon on 2/13/2020 2:33 PM    Ct Head Wo Contrast    Result Date: 2/11/2020  PROCEDURE: CT HEAD WO CONTRAST CLINICAL INFORMATION: weakness. COMPARISON: Noncontrast brain CT dated 8/10/2019 TECHNIQUE: Noncontrast 5 mm axial images were obtained through the brain. Sagittal and coronal reconstructions were obtained and reviewed. All CT scans at this facility use dose modulation, iterative reconstruction, and/or weight-based dosing when appropriate to reduce radiation dose to as low as reasonably achievable. FINDINGS: Brain: There is no acute ischemic infarct, hemorrhage, midline shift, mass, or mass effect. There is no focal abnormality of brain parenchymal attenuation. Ventricles/basal cisterns: The ventricles and cisterns are of appropriate size and configuration for the patient's age. No evidence of obstructive hydrocephalus.  Skull base/calvarium/osseous structures: Unremarkable Soft tissues: Unremarkable Intraorbital contents: Unremarkable Sinuses: Unremarkable; the imaged sinuses are clear without evidence of mucosal thickening or fluid levels. Mastoids: Unremarkable; the mastoid air cells are clear. No acute ischemic infarct, hemorrhage, or mass effect. **This report has been created using voice recognition software. It may contain minor errors which are inherent in voice recognition technology. ** Final report electronically signed by Dr. Elizabeth Egan on 2/11/2020 8:50 PM    Mri Lumbar Spine Wo Contrast    Result Date: 2/12/2020  PROCEDURE: MRI LUMBAR SPINE WO CONTRAST CLINICAL INFORMATION: pain. Lower extremity weakness. Thighs are numb and painful. Difficulty walking for 2 to 3 weeks. COMPARISON: Lumbar spine CT 2/11/2020. Lumbar spine MRI 12/31/2014. TECHNIQUE: Sagittal and axial T1 and T2-weighted images were obtained through the lumbar spine. FINDINGS: The patient has had lumbar fusion and laminectomy at L4-S1. There is stable straightening of the normal lumbar lordosis. There is new loss of disc height at the L3-4 level. This is the level above the fusion. There is edema in the endplates. There does appear fusion across the discs at the L4-5 and L5-S1 levels. There are no compression fractures. No pars defects are noted. There is normal signal in the disks above the L3-4 level. The distal spinal cord, conus medullaris and cauda equina are normal. There are no gross abnormalities in the visualized aspects of the distal thoracic spine. On the axial images, at T12-L1, there are no degenerative changes. The disc is normal. There is no spinal canal or foraminal stenosis. At L1-L2, there are mild facet degenerative changes. The disc is normal. There is no spinal canal or foraminal stenosis. At L2-L3, there are mild facet degenerative changes. There is some thickening of ligamentum flavum. There is mild stenosis of the thecal sac. There is no foraminal stenosis. At L3-L4, there is loss of disc height. There is a large central disc protrusion which migrates superiorly in the left lateral recess.  There are moderate severity facet degenerative changes. There is thickening of ligamentum flavum. There is severe spinal canal stenosis. There is compression of the cauda equina. This is new. There is moderate severity bilateral foraminal stenosis. At L4-L5, there has been posterior decompression. There is no stenosis of the thecal sac. There is moderate severity right foraminal stenosis. At L5-S1, there has been posterior decompression. There is no stenosis of the thecal sac. There is moderate severity bilateral foraminal stenosis. There are no suspicious findings in the visualized aspects of the retroperitoneum and paraspinal soft tissues. 1. Severe spinal canal stenosis with compression of the cauda equina at the L3-4 level due to loss of disc height and a large central/left central disc protrusion which migrates superiorly. There are also facet degenerative changes at this level. There is moderate severity bilateral foraminal stenosis. 2. Moderate severity bilateral foraminal stenosis at the L5-S1 level and moderate severity right foraminal stenosis at the L4-5 level. These findings were telephoned to Aysha Kennedy the patient's nurse on 8A at [address] AM on 2/12/2020 . **This report has been created using voice recognition software. It may contain minor errors which are inherent in voice recognition technology. ** Final report electronically signed by Dr. Jeff Stone on 2/12/2020 8:12 AM    Ct Abdomen Pelvis W Iv Contrast Additional Contrast? None    Result Date: 2/11/2020  PROCEDURE: CT ABDOMEN PELVIS W IV CONTRAST CLINICAL INFORMATION: low back pain . COMPARISON: None. TECHNIQUE: 5 mm axial CT images were obtained through the abdomen and pelvis after the administration of intravenous contrast. Coronal and sagittal reconstructions were obtained. All CT scans at this facility use dose modulation, iterative reconstruction, and/or weight-based dosing when appropriate to reduce radiation dose to as low as reasonably achievable.  FINDINGS: fusion. There is a defect in the posterior medial left ilium likely representing a bone graft donor site. No acute traumatic abnormality of the solid or hollow viscera of the abdomen and pelvis. No acute inflammatory or infectious process in the abdomen or pelvis. Colonic diverticulosis without evidence of diverticulitis. No evidence of bowel obstruction. Mild bilateral external iliac and inguinal probably reactive adenopathy. Postoperative changes of the lumbar spine, see above. Bibasilar interstitial infiltrates, acute versus chronic, see above. **This report has been created using voice recognition software. It may contain minor errors which are inherent in voice recognition technology. ** Final report electronically signed by Dr. Juany Banerjee on 2/11/2020 9:04 PM    Xr Chest Portable    Result Date: 2/11/2020  PROCEDURE: XR CHEST PORTABLE CLINICAL INFORMATION: fatigue. COMPARISON: Chest x-ray dated 12/9/2013 TECHNIQUE: AP Portable chest xray FINDINGS: Lines/tubes/devices: none Lungs/pleura:  No pneumonia, pulmonary edema, or obvious mass. No pleural effusion. No pneumothorax. Heart: Heart size is normal. Mediastinum/suzan: No obvious mass or adenopathy. Skeleton: No significant bone or joint abnormality. No acute cardiopulmonary disease. **This report has been created using voice recognition software. It may contain minor errors which are inherent in voice recognition technology. ** Final report electronically signed by Dr. Juany Banerjee on 2/11/2020 7:52 PM    Fluoro For Surgical Procedures    Result Date: 2/13/2020  Radiology exam is complete. No Radiologist dictation. Please follow up with ordering provider. Ct Lumbar Reconstruction Wo Post Process    Result Date: 2/11/2020  PROCEDURE: CT LUMBAR RECONSTRUCTION WO POST PROCESS CLINICAL INFORMATION: Trauma.  COMPARISON: Lumbar spine MRI dated 12/31/2014 and the lumbar spine x-rays dated 10/20/2014 TECHNIQUE: 3 mm axial CT images were reconstructed through the lumbar spine. These are reconstructed from the patient's abdomen and pelvis CT. IV contrast is present. Sagittal and coronal reconstructions were obtained. All CT scans at this facility use dose modulation, iterative reconstruction, and/or weight-based dosing when appropriate to reduce radiation dose to as low as reasonably achievable. FINDINGS: There are 5  non-rib-bearing lumbar vertebrae. The patient is status post L4-5 and L5-S1 discectomies with disc prostheses and bilateral L4-S1 pedicle screw fusion. Patient also status post L4 and L5 laminectomies. There is no fracture. There is no subluxation. There is moderate L3-4 disc space narrowing consistent with degenerative disc disease. There is mild multilevel endplate spondylosis from L3 through S1. The paravertebral soft tissues are unremarkable. The SI joints are unremarkable. There is a defect in the posterior medial left ilium likely representing a bone graft donor site. At L1-2, there is no annular bulge, spinal stenosis, focal disc protrusion, or nerve root impingement. At L2-3, there is no annular bulge, spinal stenosis, focal disc protrusion, or nerve root impingement. At L3-4, there is a possible annular bulge and moderate spinal stenosis. There is probable impingement upon the right L3 nerve root. . At L4-5, evaluation is limited due to extensive beam hardening artifact. There is no spinal stenosis. No definite L4 nerve root impingement. . At L5-S1, evaluation is limited due to extensive beam hardening artifact. There is no spinal stenosis. No definite nerve root impingement. .     Status post L4-5 and L5-S1 discectomies with disc prostheses and pedicle screw fusion and L4 and L5 laminectomies. No fracture or subluxation. Moderate L3-4 degenerative disc disease. L3-4 possible annular bulge and moderate spinal stenosis with probable impingement upon the right L3 nerve root.  See the accompanying abdomen pelvis CT report for discussion of

## 2020-02-15 LAB
ANION GAP SERPL CALCULATED.3IONS-SCNC: 10 MEQ/L (ref 8–16)
BUN BLDV-MCNC: 5 MG/DL (ref 7–22)
CALCIUM SERPL-MCNC: 8.5 MG/DL (ref 8.5–10.5)
CHLORIDE BLD-SCNC: 106 MEQ/L (ref 98–111)
CO2: 24 MEQ/L (ref 23–33)
CREAT SERPL-MCNC: 0.4 MG/DL (ref 0.4–1.2)
ERYTHROCYTE [DISTWIDTH] IN BLOOD BY AUTOMATED COUNT: 14.6 % (ref 11.5–14.5)
ERYTHROCYTE [DISTWIDTH] IN BLOOD BY AUTOMATED COUNT: 51.5 FL (ref 35–45)
GFR SERPL CREATININE-BSD FRML MDRD: > 90 ML/MIN/1.73M2
GLUCOSE BLD-MCNC: 97 MG/DL (ref 70–108)
HCT VFR BLD CALC: 27.3 % (ref 37–47)
HEMOGLOBIN: 8 GM/DL (ref 12–16)
MAGNESIUM: 2.1 MG/DL (ref 1.6–2.4)
MCH RBC QN AUTO: 28.6 PG (ref 26–33)
MCHC RBC AUTO-ENTMCNC: 29.3 GM/DL (ref 32.2–35.5)
MCV RBC AUTO: 97.5 FL (ref 81–99)
PLATELET # BLD: 439 THOU/MM3 (ref 130–400)
PMV BLD AUTO: 9.7 FL (ref 9.4–12.4)
POTASSIUM SERPL-SCNC: 3.1 MEQ/L (ref 3.5–5.2)
RBC # BLD: 2.8 MILL/MM3 (ref 4.2–5.4)
SODIUM BLD-SCNC: 140 MEQ/L (ref 135–145)
WBC # BLD: 8.3 THOU/MM3 (ref 4.8–10.8)

## 2020-02-15 PROCEDURE — 6370000000 HC RX 637 (ALT 250 FOR IP): Performed by: NEUROLOGICAL SURGERY

## 2020-02-15 PROCEDURE — 83735 ASSAY OF MAGNESIUM: CPT

## 2020-02-15 PROCEDURE — 97163 PT EVAL HIGH COMPLEX 45 MIN: CPT

## 2020-02-15 PROCEDURE — 6360000002 HC RX W HCPCS: Performed by: NEUROLOGICAL SURGERY

## 2020-02-15 PROCEDURE — 2709999900 HC NON-CHARGEABLE SUPPLY

## 2020-02-15 PROCEDURE — 2580000003 HC RX 258: Performed by: NEUROLOGICAL SURGERY

## 2020-02-15 PROCEDURE — 97116 GAIT TRAINING THERAPY: CPT

## 2020-02-15 PROCEDURE — 99232 SBSQ HOSP IP/OBS MODERATE 35: CPT | Performed by: INTERNAL MEDICINE

## 2020-02-15 PROCEDURE — 85027 COMPLETE CBC AUTOMATED: CPT

## 2020-02-15 PROCEDURE — 80048 BASIC METABOLIC PNL TOTAL CA: CPT

## 2020-02-15 PROCEDURE — 1200000000 HC SEMI PRIVATE

## 2020-02-15 PROCEDURE — 97530 THERAPEUTIC ACTIVITIES: CPT

## 2020-02-15 PROCEDURE — 6370000000 HC RX 637 (ALT 250 FOR IP): Performed by: INTERNAL MEDICINE

## 2020-02-15 PROCEDURE — 36415 COLL VENOUS BLD VENIPUNCTURE: CPT

## 2020-02-15 PROCEDURE — 99024 POSTOP FOLLOW-UP VISIT: CPT | Performed by: PHYSICIAN ASSISTANT

## 2020-02-15 RX ORDER — POTASSIUM CHLORIDE 7.45 MG/ML
10 INJECTION INTRAVENOUS PRN
Status: DISCONTINUED | OUTPATIENT
Start: 2020-02-15 | End: 2020-02-21 | Stop reason: HOSPADM

## 2020-02-15 RX ORDER — POTASSIUM CHLORIDE 20 MEQ/1
40 TABLET, EXTENDED RELEASE ORAL PRN
Status: DISCONTINUED | OUTPATIENT
Start: 2020-02-15 | End: 2020-02-21 | Stop reason: HOSPADM

## 2020-02-15 RX ADMIN — CALCIUM CARBONATE-VITAMIN D TAB 500 MG-200 UNIT 1 TABLET: 500-200 TAB at 10:28

## 2020-02-15 RX ADMIN — Medication 10 ML: at 08:30

## 2020-02-15 RX ADMIN — PANTOPRAZOLE SODIUM 40 MG: 40 TABLET, DELAYED RELEASE ORAL at 05:34

## 2020-02-15 RX ADMIN — SULFASALAZINE 1000 MG: 500 TABLET ORAL at 10:28

## 2020-02-15 RX ADMIN — NALOXEGOL OXALATE 12.5 MG: 12.5 TABLET, FILM COATED ORAL at 13:48

## 2020-02-15 RX ADMIN — ORPHENADRINE CITRATE 60 MG: 30 INJECTION INTRAMUSCULAR; INTRAVENOUS at 13:45

## 2020-02-15 RX ADMIN — PREDNISONE 15 MG: 10 TABLET ORAL at 10:28

## 2020-02-15 RX ADMIN — DOCUSATE SODIUM 200 MG: 100 CAPSULE, LIQUID FILLED ORAL at 10:28

## 2020-02-15 RX ADMIN — POTASSIUM CHLORIDE 40 MEQ: 1500 TABLET, EXTENDED RELEASE ORAL at 10:44

## 2020-02-15 RX ADMIN — ATORVASTATIN CALCIUM 20 MG: 20 TABLET, FILM COATED ORAL at 10:28

## 2020-02-15 RX ADMIN — HYDROCODONE BITARTRATE AND ACETAMINOPHEN 1 TABLET: 7.5; 325 TABLET ORAL at 14:47

## 2020-02-15 RX ADMIN — SENNOSIDES 8.6 MG: 8.6 TABLET, FILM COATED ORAL at 10:28

## 2020-02-15 RX ADMIN — Medication 10 ML: at 11:30

## 2020-02-15 RX ADMIN — SULFASALAZINE 1000 MG: 500 TABLET ORAL at 21:29

## 2020-02-15 RX ADMIN — MAGNESIUM HYDROXIDE 30 ML: 2400 SUSPENSION ORAL at 18:23

## 2020-02-15 RX ADMIN — ORPHENADRINE CITRATE 60 MG: 30 INJECTION INTRAMUSCULAR; INTRAVENOUS at 01:06

## 2020-02-15 RX ADMIN — Medication 10 ML: at 21:31

## 2020-02-15 RX ADMIN — DEXTROSE MONOHYDRATE 2 G: 50 INJECTION, SOLUTION INTRAVENOUS at 13:54

## 2020-02-15 RX ADMIN — HYDROCODONE BITARTRATE AND ACETAMINOPHEN 1 TABLET: 7.5; 325 TABLET ORAL at 08:09

## 2020-02-15 RX ADMIN — SENNOSIDES 8.6 MG: 8.6 TABLET, FILM COATED ORAL at 21:29

## 2020-02-15 RX ADMIN — HYDROCODONE BITARTRATE AND ACETAMINOPHEN 1 TABLET: 7.5; 325 TABLET ORAL at 01:04

## 2020-02-15 RX ADMIN — Medication 10 ML: at 08:31

## 2020-02-15 RX ADMIN — POLYETHYLENE GLYCOL 3350 17 G: 17 POWDER, FOR SOLUTION ORAL at 10:27

## 2020-02-15 RX ADMIN — DEXTROSE MONOHYDRATE 2 G: 50 INJECTION, SOLUTION INTRAVENOUS at 05:34

## 2020-02-15 RX ADMIN — HYDROCODONE BITARTRATE AND ACETAMINOPHEN 1 TABLET: 7.5; 325 TABLET ORAL at 21:29

## 2020-02-15 ASSESSMENT — PAIN - FUNCTIONAL ASSESSMENT
PAIN_FUNCTIONAL_ASSESSMENT: PREVENTS OR INTERFERES SOME ACTIVE ACTIVITIES AND ADLS

## 2020-02-15 ASSESSMENT — PAIN DESCRIPTION - DESCRIPTORS
DESCRIPTORS: ACHING;CRAMPING
DESCRIPTORS: ACHING
DESCRIPTORS: ACHING;CONSTANT

## 2020-02-15 ASSESSMENT — PAIN DESCRIPTION - LOCATION
LOCATION: BACK
LOCATION: BACK;ABDOMEN
LOCATION: BACK
LOCATION: BACK

## 2020-02-15 ASSESSMENT — PAIN DESCRIPTION - FREQUENCY
FREQUENCY: INTERMITTENT
FREQUENCY: CONTINUOUS
FREQUENCY: INTERMITTENT
FREQUENCY: INTERMITTENT

## 2020-02-15 ASSESSMENT — PAIN SCALES - GENERAL
PAINLEVEL_OUTOF10: 5
PAINLEVEL_OUTOF10: 5
PAINLEVEL_OUTOF10: 0
PAINLEVEL_OUTOF10: 6
PAINLEVEL_OUTOF10: 3
PAINLEVEL_OUTOF10: 6
PAINLEVEL_OUTOF10: 3
PAINLEVEL_OUTOF10: 2
PAINLEVEL_OUTOF10: 4
PAINLEVEL_OUTOF10: 2
PAINLEVEL_OUTOF10: 5
PAINLEVEL_OUTOF10: 0
PAINLEVEL_OUTOF10: 6

## 2020-02-15 ASSESSMENT — PAIN DESCRIPTION - PAIN TYPE
TYPE: SURGICAL PAIN
TYPE: SURGICAL PAIN;ACUTE PAIN
TYPE: SURGICAL PAIN

## 2020-02-15 ASSESSMENT — PAIN DESCRIPTION - ORIENTATION
ORIENTATION: MID;LOWER
ORIENTATION: LOWER;MID
ORIENTATION: MID;LOWER
ORIENTATION: MID;LOWER
ORIENTATION: LOWER;MID
ORIENTATION: MID;LOWER

## 2020-02-15 ASSESSMENT — PAIN DESCRIPTION - PROGRESSION
CLINICAL_PROGRESSION: NOT CHANGED

## 2020-02-15 ASSESSMENT — PAIN DESCRIPTION - ONSET
ONSET: ON-GOING

## 2020-02-15 ASSESSMENT — PAIN DESCRIPTION - DIRECTION: RADIATING_TOWARDS: BIL THIGHS

## 2020-02-15 NOTE — PROGRESS NOTES
[facility]  [address]  Occupational Therapy  Daily Note  Time:   Time In: 1005  Time Out: 1580  Timed Code Treatment Minutes: 24 Minutes  Minutes: 24          Date: 2/15/2020  Patient Name: Areta Libman,   Gender: female      Room: -003-A  MRN: [id number]  : 1965  (62 y.o.)  Referring Practitioner: Padmaja Brasher MD  Diagnosis: Extremity weakness  Additional Pertinent Hx: Areta Libman is a 62 y.o. female whopresents to the emergency department from home for fatigue and extremity weakness secondary to back pain. Patient reports that her back pain shoots into her legs. She reports trouble with movement and getting up. Patient reports a history of similar back pain. She states that she has had 2 back surgeries in the past. Patient is on medications for her chronic pain, which she does take regularly. S/P LUMBAR DECOMPRESSION, DISC REMOVAL BETWEEN L3-4, And extension of previous L4-S1 instrumentation and fusion L3.on 2020    Restrictions/Precautions:  Restrictions/Precautions: Fall Risk, General Precautions  Position Activity Restriction  Spinal Precautions: No Bending, No Lifting, No Twisting    SUBJECTIVE: Pt sidelying in bed upon arrival and required minimal encouragement to particpate in OT tx session. Student nursing staff awaiting instructors to administer medications. Discussed sitting EOB and t/f training with pt agreeable. PAIN: 5/10: B LE (hamstrings) and back. States fluctuates 'up and down' with standing task. COGNITION: WNL    BALANCE:  Sitting Balance:  Stand By Assistance. static sitting EOB. Cues provided to scoot and achieve good B LE support on floor due to L LE dangling. No LOB/leaning noted. Standing Balance: Contact Guard Assistance. B UE support on walker. Cuing to relax shoulders and neck and breathing with pt stating feeling more relaxed. Pt fearful with standing and states she tenses up.      BED MOBILITY:  Supine to Sit: Minimal Assistance, with verbal cues , with increased time for completion     TRANSFERS:  Sit to Stand:  Minimal Assistance, with increased time for completion, cues for hand placement. Stand to Sit: Minimal Assistance, with increased time for completion, cues for hand placement. Pt positions self with close stance to prepare for t/f. Provided demo and instruction for optimal stance to promote good balance and reduce fall risk. Also reviewed achieving adequate knee flexion to prepare for t/f. Good follow through with techniques. FUNCTIONAL MOBILITY:  Not tested due to be assessed by OTR. ADDITIONAL ACTIVITIES:  Reviewed precautions. Pt unable to state other than not sitting up in a chair. Reviewed no bending, lifting, twisting (BLT) with functional examples discussed and open ended questions with pt able to come up with solutions for obtaining items out of reach. Discussed requesting help, repositioning, and use of AE (LHAE-not issued at this time). ASSESSMENT:     Activity Tolerance:  Patient tolerance of  treatment: good. Discharge Recommendations: Patient would benefit from continued therapy after discharge, Continue to assess pending progress  Equipment Recommendations:  Other: may need a RW and BSC pending if she has them at home   Plan: Times per week: 6x  Current Treatment Recommendations: Strengthening, Balance Training, Endurance Training, Patient/Caregiver Education & Training, Self-Care / ADL, Safety Education & Training, Functional Mobility Training    Patient Education  Patient Education: Role of OT, Plan of Care and Precautions    Goals  Short term goals  Time Frame for Short term goals: by discharge   Short term goal 1: Pt to complete sit to stand from various surfaces including toilet with SBA and no vcs for safety orback precautions   Short term goal 2: Pt to be educated on back precautions and dmeo follow through or verbalize during ADL tasks with min vcs   Short term goal 3: pt to compelte LB

## 2020-02-15 NOTE — PLAN OF CARE
Problem: Falls - Risk of:  Goal: Will remain free from falls  Description  Will remain free from falls  Outcome: Ongoing  Note:   Patient rates her pain as a 5 out of 10. Pain goal 2. Patient taking Norco as needed for pain. Problem: Falls - Risk of:  Goal: Absence of physical injury  Description  Absence of physical injury  Outcome: Ongoing  Note:   Pt free of physical injury. Problem: Pain:  Goal: Pain level will decrease  Description  Pain level will decrease  Outcome: Ongoing  Note:   Pt rates her pain as 5 out of 10. Pain goal 2. Patient taking Norco as needed for pain. Problem: Pain:  Goal: Control of acute pain  Description  Control of acute pain  Outcome: Ongoing  Note:   Pt voices repositioning and pain medications help reduce her pain. Problem: Pain:  Goal: Control of chronic pain  Description  Control of chronic pain  Outcome: Ongoing  Note:   Patient denies chronic pain. Problem: Risk for Impaired Skin Integrity  Goal: Tissue integrity - skin and mucous membranes  Description  Structural intactness and normal physiological function of skin and  mucous membranes. Outcome: Ongoing  Note:   Surgical dressing to back dry and intact. Patient able to reposition self in bed. No other skin breakdown noted. Problem: Nutrition  Goal: Optimal nutrition therapy  Outcome: Ongoing  Note:   Patient ate 25% of meals today. Problem: Musculor/Skeletal Functional Status  Goal: Highest potential functional level  Outcome: Ongoing  Note:   Pt working with PT daily. Patient up with one assist with walker. Patient sat in chair for half an hour today. Problem: Discharge Planning:  Goal: Discharged to appropriate level of care  Description  Discharged to appropriate level of care  Outcome: Ongoing  Note:   Discharge planning in progress. Patient plans to go home at discharge with spouse. No date at this time.       Problem: Infection - Surgical Site:  Goal: Will show no infection signs and symptoms  Description  Will show no infection signs and symptoms  Outcome: Ongoing  Note:   Patient afebrile. Surgical dressing to back dry and intact. Problem: Venous Thromboembolism:  Goal: Will show no signs or symptoms of venous thromboembolism  Description  Will show no signs or symptoms of venous thromboembolism  Outcome: Ongoing  Note:   Patient has SCD bilateral lower legs. Patient denies calf pain. Care plan reviewed with patient. Patient verbalize understanding of the plan of care and contribute to goal setting.

## 2020-02-15 NOTE — PROGRESS NOTES
Pt arrived in 7K28 via bed from 4A. Patient alert and oriented x 4. Patient oriented x 4.  at bedside. Call light within reach. See vitals and assessment for further details.

## 2020-02-15 NOTE — PROGRESS NOTES
Neurosurgery Progress Note    Patient:  Gabriela Zavala      Unit/Bed:7K-28/028-A    YOB: 1965    MRN: [id number]     Acct: [de-identified]     Admit date: 2/11/2020    Chief Complaint   Patient presents with    Fatigue    Extremity Weakness       Patient Seen, Chart, Physician notes, Labs, Radiology studies reviewed. Subjective: She remains postoperative day #2 from reexploration and revision of lumbar fusion with extension to lumbar 3 performed by Dr. Shelby Luna, without complication. She is comfortable this morning on exam with pain well-controlled. Past, Family, Social History unchanged from admission. Diet:  DIET LOW SODIUM 2 GM; Carb Control: 4 carb choices (60 gms)/meal  Dietary Nutrition Supplements: Low Calorie High Protein Supplement    Medications:  Scheduled Meds:   potassium replacement protocol   Other RX Placeholder    magnesium replacement protocol   Other RX Placeholder    naloxegol  12.5 mg Oral QAM    senna  1 tablet Oral BID    docusate sodium  200 mg Oral Daily    polyethylene glycol  17 g Oral Daily    sodium chloride flush  10 mL Intravenous 2 times per day    ceFAZolin (ANCEF) IVPB  2 g Intravenous Q8H    [Held by provider] losartan  50 mg Oral Daily    And    [Held by provider] hydrochlorothiazide  12.5 mg Oral Daily    lidocaine  1 patch Transdermal Daily    orphenadrine  60 mg Intramuscular Q12H    atorvastatin  20 mg Oral Daily    calcium-vitamin D  1 tablet Oral Daily    pantoprazole  40 mg Oral QAM AC    sulfaSALAzine  1,000 mg Oral BID    predniSONE  15 mg Oral Daily     Continuous Infusions:  PRN Meds:potassium chloride **OR** potassium alternative oral replacement **OR** potassium chloride, sodium chloride flush, ondansetron, cyclobenzaprine, ketorolac, magnesium hydroxide, acetaminophen, HYDROcodone-acetaminophen    Objective: She is comfortable today with pain well-controlled.   She tolerated removal of the drain at bedside today, without input(s): CHOL, TRIG, HDL, LDLCALC in the last 72 hours. Invalid input(s): LDL    Radiology reports as per the Radiologist  Radiology: Xr Lumbar Spine (2-3 Views)    Result Date: 2/13/2020  PROCEDURE: XR LUMBAR SPINE (2-3 VIEWS) CLINICAL INFORMATION: surgery COMPARISON: 10/20/2014 TECHNIQUE:  10 fluoroscopic intraoperative views of the lumbar spine  were obtained. FINDINGS: Fluoroscopic intraoperative views of the lumbar spine depict revision of posterior lower lumbar fusion. Images demonstrate a surgical instrument directed towards the intervertebral disc space at L3-L4. The final image demonstrates posterior fusion hardware extending from L3 through S1. Fluoro Time: 1 min 19 secs Mode of Operation: norm Performing Physician:Dr august     1. Posterior lumbar fusion revision is demonstrated. Please refer to operative report for further details. **This report has been created using voice recognition software. It may contain minor errors which are inherent in voice recognition technology. ** Final report electronically signed by Dr. Tyrese Ruffin on 2/13/2020 2:33 PM    Ct Head Wo Contrast    Result Date: 2/11/2020  PROCEDURE: CT HEAD WO CONTRAST CLINICAL INFORMATION: weakness. COMPARISON: Noncontrast brain CT dated 8/10/2019 TECHNIQUE: Noncontrast 5 mm axial images were obtained through the brain. Sagittal and coronal reconstructions were obtained and reviewed. All CT scans at this facility use dose modulation, iterative reconstruction, and/or weight-based dosing when appropriate to reduce radiation dose to as low as reasonably achievable. FINDINGS: Brain: There is no acute ischemic infarct, hemorrhage, midline shift, mass, or mass effect. There is no focal abnormality of brain parenchymal attenuation. Ventricles/basal cisterns: The ventricles and cisterns are of appropriate size and configuration for the patient's age. No evidence of obstructive hydrocephalus.  Skull base/calvarium/osseous structures: Unremarkable Soft tissues: Unremarkable Intraorbital contents: Unremarkable Sinuses: Unremarkable; the imaged sinuses are clear without evidence of mucosal thickening or fluid levels. Mastoids: Unremarkable; the mastoid air cells are clear. No acute ischemic infarct, hemorrhage, or mass effect. **This report has been created using voice recognition software. It may contain minor errors which are inherent in voice recognition technology. ** Final report electronically signed by Dr. Randell Pedersen on 2/11/2020 8:50 PM    Mri Lumbar Spine Wo Contrast    Result Date: 2/12/2020  PROCEDURE: MRI LUMBAR SPINE WO CONTRAST CLINICAL INFORMATION: pain. Lower extremity weakness. Thighs are numb and painful. Difficulty walking for 2 to 3 weeks. COMPARISON: Lumbar spine CT 2/11/2020. Lumbar spine MRI 12/31/2014. TECHNIQUE: Sagittal and axial T1 and T2-weighted images were obtained through the lumbar spine. FINDINGS: The patient has had lumbar fusion and laminectomy at L4-S1. There is stable straightening of the normal lumbar lordosis. There is new loss of disc height at the L3-4 level. This is the level above the fusion. There is edema in the endplates. There does appear fusion across the discs at the L4-5 and L5-S1 levels. There are no compression fractures. No pars defects are noted. There is normal signal in the disks above the L3-4 level. The distal spinal cord, conus medullaris and cauda equina are normal. There are no gross abnormalities in the visualized aspects of the distal thoracic spine. On the axial images, at T12-L1, there are no degenerative changes. The disc is normal. There is no spinal canal or foraminal stenosis. At L1-L2, there are mild facet degenerative changes. The disc is normal. There is no spinal canal or foraminal stenosis. At L2-L3, there are mild facet degenerative changes. There is some thickening of ligamentum flavum. There is mild stenosis of the thecal sac. There is no foraminal stenosis.  At L3-L4, there iterative reconstruction, and/or weight-based dosing when appropriate to reduce radiation dose to as low as reasonably achievable. FINDINGS: Lower chest: There are bibasilar interstitial infiltrates. Differential diagnosis includes pulmonary edema, atypical pneumonia, and/or fibrosis. There are 1 cm and smaller bilateral lower lobe bullae. Liver: There are a few 5 mm and smaller hypodense lesions which are difficult to characterize, possibly representing cysts. . There is no solid appearing liver mass or intrahepatic biliary dilatation. Gallbladder/Biliary tree: Unremarkable. No calcified gallstones. No extrahepatic biliary dilatation. Spleen: Unremarkable. No splenomegaly. Pancreas: Unremarkable. No mass or pancreatic ductal dilatation. No findings to suggest acute pancreatitis. Adrenal glands: Unremarkable. No mass. Kidneys and ureters: There is an 8 mm probable cyst of the upper pole of the left kidney. The kidneys are otherwise unremarkable. . No hydroureteronephrosis. No renal or ureteral calculi. No findings to suggest acute pyelonephritis. Stomach, small bowel, and colon: Stomach and duodenum are unremarkable. There is colonic diverticulosis without diverticulitis. Small bowel and colon are otherwise unremarkable. There is no evidence of bowel wall thickening. No evidence of bowel obstruction. Appendix: The appendix is not visualized however there are no findings to suggest acute appendicitis. Omentum and mesentery: Unremarkable Aorta, vascular: No aortic aneurysm or dissection. There are aortoiliofemoral atherosclerotic calcifications. Reproductive: Unremarkable Bladder: Unremarkable. No wall thickening or obvious mass. No calcified stones. Intraperitoneal/retroperitoneal Space: There is no ascites, abscess, or mass. No pneumoperitoneum. There is mild bilateral external iliac adenopathy, likely reactive. Abdominal and pelvic body wall soft tissues: There is mild bilateral inguinal adenopathy, likely reactive. Musculoskeletal structures: Patient is status post L4 and L5 laminectomies, L4-5 and L5-S1 discectomies with bilateral L4-S1 pedicle screw fusion. There is a defect in the posterior medial left ilium likely representing a bone graft donor site. No acute traumatic abnormality of the solid or hollow viscera of the abdomen and pelvis. No acute inflammatory or infectious process in the abdomen or pelvis. Colonic diverticulosis without evidence of diverticulitis. No evidence of bowel obstruction. Mild bilateral external iliac and inguinal probably reactive adenopathy. Postoperative changes of the lumbar spine, see above. Bibasilar interstitial infiltrates, acute versus chronic, see above. **This report has been created using voice recognition software. It may contain minor errors which are inherent in voice recognition technology. ** Final report electronically signed by Dr. Yanna Fitzgerald on 2/11/2020 9:04 PM    Xr Chest Portable    Result Date: 2/11/2020  PROCEDURE: XR CHEST PORTABLE CLINICAL INFORMATION: fatigue. COMPARISON: Chest x-ray dated 12/9/2013 TECHNIQUE: AP Portable chest xray FINDINGS: Lines/tubes/devices: none Lungs/pleura:  No pneumonia, pulmonary edema, or obvious mass. No pleural effusion. No pneumothorax. Heart: Heart size is normal. Mediastinum/suzan: No obvious mass or adenopathy. Skeleton: No significant bone or joint abnormality. No acute cardiopulmonary disease. **This report has been created using voice recognition software. It may contain minor errors which are inherent in voice recognition technology. ** Final report electronically signed by Dr. Yanna Fitzgerald on 2/11/2020 7:52 PM    Fluoro For Surgical Procedures    Result Date: 2/13/2020  Radiology exam is complete. No Radiologist dictation. Please follow up with ordering provider. Ct Lumbar Reconstruction Wo Post Process    Result Date: 2/11/2020  PROCEDURE: CT LUMBAR RECONSTRUCTION WO POST PROCESS CLINICAL INFORMATION: Trauma. COMPARISON: Lumbar spine MRI dated 12/31/2014 and the lumbar spine x-rays dated 10/20/2014 TECHNIQUE: 3 mm axial CT images were reconstructed through the lumbar spine. These are reconstructed from the patient's abdomen and pelvis CT. IV contrast is present. Sagittal and coronal reconstructions were obtained. All CT scans at this facility use dose modulation, iterative reconstruction, and/or weight-based dosing when appropriate to reduce radiation dose to as low as reasonably achievable. FINDINGS: There are 5  non-rib-bearing lumbar vertebrae. The patient is status post L4-5 and L5-S1 discectomies with disc prostheses and bilateral L4-S1 pedicle screw fusion. Patient also status post L4 and L5 laminectomies. There is no fracture. There is no subluxation. There is moderate L3-4 disc space narrowing consistent with degenerative disc disease. There is mild multilevel endplate spondylosis from L3 through S1. The paravertebral soft tissues are unremarkable. The SI joints are unremarkable. There is a defect in the posterior medial left ilium likely representing a bone graft donor site. At L1-2, there is no annular bulge, spinal stenosis, focal disc protrusion, or nerve root impingement. At L2-3, there is no annular bulge, spinal stenosis, focal disc protrusion, or nerve root impingement. At L3-4, there is a possible annular bulge and moderate spinal stenosis. There is probable impingement upon the right L3 nerve root. . At L4-5, evaluation is limited due to extensive beam hardening artifact. There is no spinal stenosis. No definite L4 nerve root impingement. . At L5-S1, evaluation is limited due to extensive beam hardening artifact. There is no spinal stenosis. No definite nerve root impingement. .     Status post L4-5 and L5-S1 discectomies with disc prostheses and pedicle screw fusion and L4 and L5 laminectomies. No fracture or subluxation. Moderate L3-4 degenerative disc disease.  L3-4 possible annular bulge and moderate spinal stenosis with probable impingement upon the right L3 nerve root. See the accompanying abdomen pelvis CT report for discussion of nonspine findings. **This report has been created using voice recognition software. It may contain minor errors which are inherent in voice recognition technology. ** Final report electronically signed by Dr. Amy Prescott on 2/11/2020 8:56 PM    Mri Brain Wo Contrast    Result Date: 2/12/2020  PROCEDURE: MRI BRAIN WO CONTRAST CLINICAL INFORMATION weakness. Lower extremity weakness. Thighs are numb and painful. COMPARISON: Head CT 2/11/2020. TECHNIQUE: Multiplanar and multiple spin echo MRI images were obtained of the brain without contrast. FINDINGS: The diffusion-weighted images are normal.  The brain volume is normal. There is minimal signal hyperintensity scattered in the white matter of the brain suggestive of minimal severity chronic small vessel ischemic changes. There are no intra-or extra-axial collections. There is no hydrocephalus, midline shift or mass effect. There is mineralization in the medial aspects of the basal ganglia bilaterally. No other areas of susceptibility artifact are present. The major intracranial vascular flow voids are present. The midline craniocervical junction structures are normal.  There is a partially empty sella turcica. The brainstem appears normal.     1. No acute findings. 2. Minimal severity chronic small vessel ischemic changes. **This report has been created using voice recognition software. It may contain minor errors which are inherent in voice recognition technology. ** Final report electronically signed by Dr. Nicole Fox on 2/12/2020 7:44 AM    A/P: S/P she remains status postoperative day #2 from reexploration and revision of lumbar fusion with extension to lumbar 3 performed by Dr. Tommy Latif, without complication.   She demonstrates improvement for lower extremity strength and sensation and her pain is well controlled with a dry intact dressing over her incision. The drain was removed at bedside, without complication. We recommend PT, and OT as tolerated for today with placement with TCU/inpatient rehab when space is available.  Neurosurgery to follow    Electronically signed by Paulina Smith PA-C on 2/15/2020 at 12:14 PM

## 2020-02-15 NOTE — PROGRESS NOTES
Pt resting in bed on R side. C/O pain to lower back and upper thighs, cramping in abdomen. Pain rated 5/10. Pain medication given one hour ago. Denies needs. Bed alarm on. Call light within reach.  Samantha Dyson MICHAELA-SN

## 2020-02-15 NOTE — PROGRESS NOTES
[address] [address]  INPATIENT PHYSICAL THERAPY  EVALUATION  [facility] 7K - 7K-28/028-A    Time In: 9398  Time Out: 9097  Timed Code Treatment Minutes: 9 Minutes  Minutes: 25          Date: 2/15/2020  Patient Name: Radha Dixon,  Gender:  female        MRN: [id number]  : 1965  (62 y.o.)      Referring Practitioner: Dr. Eitan Beltran     Additional Pertinent Hx: admit with above diagnosis, s/p LUMBAR DECOMPRESSION, DISC REMOVAL BETWEEN L3-4, And extension of previous L4-S1 instrumentation and fusion L3 on 20     Restrictions/Precautions:  Restrictions/Precautions: Fall Risk, General Precautions  Position Activity Restriction  Spinal Precautions: No Bending, No Lifting, No Twisting    Subjective:  Chart Reviewed: Yes  Patient assessed for rehabilitation services?: Yes  Subjective: pleasant and cooperative    General:                      Pain:  Yes. Pain Assessment  Pain Level: 6(back with mobility)       Social/Functional History:    Lives With: Spouse  Type of Home: House  Home Layout: One level  Home Access: Stairs to enter with rails  Entrance Stairs - Number of Steps: 1 platform step   Home Equipment: Cane, Standard walker     Bathroom Shower/Tub: Tub/Shower unit(possibly has a shower chair )  Bathroom Toilet: Standard  Bathroom Equipment: Toilet raiser(she thinks she has )  Bathroom Accessibility: Accessible  IADL Comments: pt compelting all homemaking tasks        ADL Assistance: Independent  Homemaking Assistance: Independent  Ambulation Assistance: Independent  Transfer Assistance: Independent       Type of occupation: pt is on disability   Additional Comments: pt indep all ADL tasks.      OBJECTIVE:  Range of Motion:  Bilateral Lower Extremity: WFL    Strength:  Bilateral Lower Extremity: WFL , generalized weakness    Balance:  Static Sitting Balance:  Modified Independent  Dynamic Sitting Balance: Supervision, for toileting  Static Standing Balance: Contact Guard Assistance  Dynamic Standing Balance: Contact Guard Assistance, with 0-1 UE support reaching waist to shoulder level    Bed Mobility:  Rolling to Right: Stand By Assistance   Supine to Sit: Minimal Assistance  Scooting: Contact Guard Assistance  HOB up 30 degrees and use BR  Transfers:  Sit to Stand: Contact Guard Assistance  Stand to [address] for hand placement  Ambulation:  Contact Guard Assistance  Distance: 12'x2  Surface: Level Tile  Device:Rolling Walker  Gait Deviations:  Slow Lizz and pt very guarded, min unsteady    Reviewed back precautions, log roll technique    Functional Outcome Measures: Completed  AM-PAC Inpatient Mobility without Stair Climbing Raw Score : 15  AM-PAC Inpatient without Stair Climbing T-Scale Score : 43.03    ASSESSMENT:  Activity Tolerance:  Patient tolerance of  treatment: good. Treatment Initiated: Treatment and education initiated within context of evaluation. Evaluation time included review of current medical information, gathering information related to past medical, social and functional history, completion of standardized testing, formal and informal observation of tasks, assessment of data and development of plan of care and goals. Treatment time included skilled education and facilitation of tasks to increase safety and independence with functional mobility for improved independence and quality of life. Assessment:   Body structures, Functions, Activity limitations: Decreased functional mobility , Decreased strength, Decreased endurance, Decreased balance, Increased pain  Assessment: pt with back pain, back precautions, generalized weakness, dec balance, use of walker and inc assist for safe mobility, recommend cont PT to inc pt I with functional mobility  Prognosis: Excellent    REQUIRES PT FOLLOW UP: Yes    Discharge Recommendations:  Discharge Recommendations: Continue to assess pending progress    Patient Education:  PT Education: Goals, PT Role, Precautions, Plan of Care    Equipment Recommendations:  Equipment Needed: Yes  Mobility Devices: Delphia Ricardo: Rolling    Plan:  Times per week: 6-7X O  Times per day: Daily  Specific instructions for Next Treatment: therex and mobility with back precautions    Goals:  Patient goals : go home  Short term goals  Time Frame for Short term goals: by discharge  Short term goal 1: bed mobility with S  Short term goal 2: transfer with S  Short term goal 3: amb >100'x1 with walker and S to walk safely in home  Short term goal 4: negotiate platform step with walker and SBA to enter home safely  Long term goals  Time Frame for Long term goals : no LTGs set secondary to short ELOS    Following session, patient left in safe position with all fall risk precautions in place.

## 2020-02-15 NOTE — PROGRESS NOTES
Hospitalist Progress Note    Patient:  Luis Manuel Rodriguez      Unit/Bed:4A-03/003-A    YOB: 1965    MRN: [id number]       Acct: [de-identified]     PCP: FABIAN Golden CNP    Date of Admission: 2/11/2020      Assessment/Plan:  Active Hospital Problems    Diagnosis Date Noted    Cauda equina compression ([address] [G83.4] 02/12/2020    Weakness [R53.1] 02/11/2020    Anterior thigh numbness [R20.0] 02/11/2020    Intractable back pain [M54.9] 02/11/2020    Decreased activities of daily living (ADL) [Z78.9] 02/11/2020    Anemia [D64.9] 02/11/2020    Elevated C-reactive protein (CRP) [R79.82] 02/11/2020    Elevated sed rate [R70.0] 02/11/2020    Lung infiltrate [R91.8] 02/11/2020    Lumbar nerve root impingement [M54.16] 02/11/2020    Essential hypertension [I10] 04/23/2018    Back pain, chronic [M54.9, G89.29] 12/12/2013    GERD (gastroesophageal reflux disease) [K21.9] 04/19/2013    History of rheumatoid arthritis [Z87.39] 04/19/2013       1. Severe spinal canal stenosis with compression of the cauda equina at the level of L3-L4: Pt had surgery on 2/13 & tolerated it well. Dr. Gee Duong performed. 1. PT/OT started. 2. Neurosurgery following. 3. Drain removed 2/15am  4. Analgesia and bowel regimen in place. 2. Hypokalemia: will replace, due to poor oral intake? Normal Mg. Monitor. 3. Bilateral lower legs weakness/numbness: Likely secondary to #1.  Continue [place].  Neurosurgery team to manage pain management. 1. This is improving. Numbness is better. 4. Degenerative disease lumbar multilevel: Supportive treatment, see above. 5. Acute postop blood loss Anemia, Normocytic: on arrival was 11.3, drop postop. 1. Appears to be stabilizing ~8. Trend. Transfuse if <7.     2. Will check soluble transferrin receptor as there was low iron but elevated ferritin. 6. Hx HTN: hold antihypertensives postop as BP on lower side.    1. Consider stress dose steroids if necessary since on steroids chronically. 7. Constipation: added on bowel regimen. Will monitor while on opioids. 1. Add on movantic to regimen. 2. Flatus but no BM yet. 3. Encourage OOB. 8. Right facial swelling: suspect due to surgery and positioning, compresses for symptomatic control. No evidence to suggest angioedema. 1. Resolving. 9. Active rheumatoid arthritis currently on DMARDs: Chronically on sulfasalazine and prednisone with methotrexate but not taking folic acid currently.  Also on [place] as outpatient, started more recently. 1. Continue pred, sulfasalazine, mtx  2. Restart folic acid 1 mg daily. 3. Continue [place] to prevent prednisone induced osteoporosis. 10. Bibasilar interstitial infiltrates: CT abdomen/pelvis documented this finding, patient started on ceftriaxone and azithromycin for assumed CAP. 1. Procal 0.04, will DC'ed ABX. 2. Will keep Ancef for post-op as per NS rec's. Expected discharge date:  tbd    Disposition: SNF/IPR         [] Home       [] TCU       [] Rehab       [] Psych       [] SNF       [] [place]       [] Other-    --------------------------------------------    Chief Complaint: Cauda equina. Hospital Course: Per HPI, \"24 y.o. female who presented to [facility] with complaints of lower extremity weakness. Thighs are reported to be numb and painful. Symptoms located bilateral anterior thighs. Patient endorses lumbar pain and is having difficulty ambulatin x 2-3 weeks. She denies injury. She reports history of lumbar fusion in the past. She notes that she is not having any bowel or bladder incontinence.  She denies chest pain or shortness of breath. Denies nausea, vomiting, diarrhea or constipation. Denies fevers. Endorses chills. Patient has history of RA and is on steroids at home. \"    S/p cervical spine surgery on 2/13, with improvement in symptoms. Subjective (past 24 hours): Patient seen at bedside.  Pain is well controlled on midline. Respiratory:  Normal respiratory effort. Clear to auscultation, bilaterally without rales or wheezes or Rhonchi. Cardiovascular: Normal rate, regular rhythm with normal S1/S2 without murmurs. No lower extremity edema. Abdomen: Soft,  with normal bowel sounds. Mildly tender in epigastrum. Nondistended  Musculoskeletal: Normal range of motion in extremities. Evidence of arthritis most notably in bilateral hands Back bandage and drain in place. Skin: Skin color, texture, turgor normal.  No rashes or lesions. Neurologic:  Bilateral LE weakness is improved, Sensation intact bilaterally but slight decrease in anterior thighs BL. Otherwise CN and UE without any gross deficits. General weakness. Psychiatric: Alert and oriented, thought content appropriate, normal insight. Capillary Refill: Brisk,< 3 seconds. Peripheral Pulses: +2 palpable, equal bilaterally. Labs:   Recent Labs     02/13/20  0646 02/13/20  1421 02/14/20  0358   WBC 9.9  --  7.9   HGB 9.8* 7.9* 8.4*   HCT 32.1*  --  27.8*   *  --  412*     Recent Labs     02/13/20  0646 02/13/20  1419    145   K 3.8 3.8     --    CO2 21*  --    BUN 7  --    CREATININE 0.4  --    CALCIUM 9.1  --      No results for input(s): AST, ALT, BILIDIR, BILITOT, ALKPHOS in the last 72 hours. No results for input(s): INR in the last 72 hours. No results for input(s): [place] in the last 72 hours. Microbiology:      Urinalysis:      Lab Results   Component Value Date    NITRU NEGATIVE 02/11/2020    WBCUA 0-2 02/11/2020    BACTERIA NONE SEEN 02/11/2020    RBCUA 3-5 02/11/2020    BLOODU NEGATIVE 02/11/2020    SPECGRAV 1.029 05/24/2013    GLUCOSEU NEGATIVE 02/11/2020       Radiology:  XR LUMBAR SPINE (2-3 VIEWS)   Final Result   1. Posterior lumbar fusion revision is demonstrated. Please refer to operative report for further details. **This report has been created using voice recognition software.   It may contain above. **This report has been created using voice recognition software. It may contain minor errors which are inherent in voice recognition technology. **      Final report electronically signed by Dr. Amanuel Marino on 2/11/2020 9:04 PM      CT LUMBAR RECONSTRUCTION WO POST PROCESS   Final Result   Status post L4-5 and L5-S1 discectomies with disc prostheses and pedicle screw fusion and L4 and L5 laminectomies. No fracture or subluxation. Moderate L3-4 degenerative disc disease. L3-4 possible annular bulge and moderate spinal stenosis with probable impingement upon the right L3 nerve root. See the accompanying abdomen pelvis CT report for discussion of nonspine findings. **This report has been created using voice recognition software. It may contain minor errors which are inherent in voice recognition technology. **         Final report electronically signed by Dr. Amanuel Marino on 2/11/2020 8:56 PM      CT HEAD WO CONTRAST   Final Result      No acute ischemic infarct, hemorrhage, or mass effect. **This report has been created using voice recognition software. It may contain minor errors which are inherent in voice recognition technology. **      Final report electronically signed by Dr. Amanuel Marino on 2/11/2020 8:50 PM      XR CHEST PORTABLE   Final Result      No acute cardiopulmonary disease. **This report has been created using voice recognition software. It may contain minor errors which are inherent in voice recognition technology. **      Final report electronically signed by Dr. Amanuel Marino on 2/11/2020 7:52 PM          DVT prophylaxis: [] Lovenox                                  [x] SCDs                                 [] SQ Heparin                                 [] Encourage ambulation           [] Already on Anticoagulation     Code Status: Full Code    PT/OT Eval Status: yes    Diet:   DIET LOW SODIUM 2 GM; Carb Control: 4 carb choices (60 gms)/meal  Dietary Nutrition Supplements: Low Calorie High Protein Supplement    Fluids: dc    Tele:   [x] yes             [] no      Electronically signed by Melinda Chang DO on 2/15/2020 at 7:50 AM

## 2020-02-16 LAB
ANION GAP SERPL CALCULATED.3IONS-SCNC: 11 MEQ/L (ref 8–16)
BUN BLDV-MCNC: 4 MG/DL (ref 7–22)
CALCIUM SERPL-MCNC: 8.7 MG/DL (ref 8.5–10.5)
CHLORIDE BLD-SCNC: 107 MEQ/L (ref 98–111)
CO2: 26 MEQ/L (ref 23–33)
CREAT SERPL-MCNC: 0.4 MG/DL (ref 0.4–1.2)
ERYTHROCYTE [DISTWIDTH] IN BLOOD BY AUTOMATED COUNT: 14.6 % (ref 11.5–14.5)
ERYTHROCYTE [DISTWIDTH] IN BLOOD BY AUTOMATED COUNT: 51.6 FL (ref 35–45)
GFR SERPL CREATININE-BSD FRML MDRD: > 90 ML/MIN/1.73M2
GLUCOSE BLD-MCNC: 98 MG/DL (ref 70–108)
HCT VFR BLD CALC: 29 % (ref 37–47)
HEMOGLOBIN: 8.8 GM/DL (ref 12–16)
MCH RBC QN AUTO: 29.3 PG (ref 26–33)
MCHC RBC AUTO-ENTMCNC: 30.3 GM/DL (ref 32.2–35.5)
MCV RBC AUTO: 96.7 FL (ref 81–99)
PLATELET # BLD: 476 THOU/MM3 (ref 130–400)
PMV BLD AUTO: 9.7 FL (ref 9.4–12.4)
POTASSIUM SERPL-SCNC: 3.5 MEQ/L (ref 3.5–5.2)
RBC # BLD: 3 MILL/MM3 (ref 4.2–5.4)
SODIUM BLD-SCNC: 144 MEQ/L (ref 135–145)
WBC # BLD: 10 THOU/MM3 (ref 4.8–10.8)

## 2020-02-16 PROCEDURE — 36415 COLL VENOUS BLD VENIPUNCTURE: CPT

## 2020-02-16 PROCEDURE — 6370000000 HC RX 637 (ALT 250 FOR IP): Performed by: INTERNAL MEDICINE

## 2020-02-16 PROCEDURE — 99024 POSTOP FOLLOW-UP VISIT: CPT | Performed by: NEUROLOGICAL SURGERY

## 2020-02-16 PROCEDURE — 99232 SBSQ HOSP IP/OBS MODERATE 35: CPT | Performed by: INTERNAL MEDICINE

## 2020-02-16 PROCEDURE — 6360000002 HC RX W HCPCS: Performed by: NEUROLOGICAL SURGERY

## 2020-02-16 PROCEDURE — 85027 COMPLETE CBC AUTOMATED: CPT

## 2020-02-16 PROCEDURE — 2580000003 HC RX 258: Performed by: NEUROLOGICAL SURGERY

## 2020-02-16 PROCEDURE — 6370000000 HC RX 637 (ALT 250 FOR IP): Performed by: NEUROLOGICAL SURGERY

## 2020-02-16 PROCEDURE — 94760 N-INVAS EAR/PLS OXIMETRY 1: CPT

## 2020-02-16 PROCEDURE — 1200000000 HC SEMI PRIVATE

## 2020-02-16 PROCEDURE — 80048 BASIC METABOLIC PNL TOTAL CA: CPT

## 2020-02-16 RX ORDER — BISACODYL 10 MG
10 SUPPOSITORY, RECTAL RECTAL DAILY PRN
Status: DISCONTINUED | OUTPATIENT
Start: 2020-02-16 | End: 2020-02-21 | Stop reason: HOSPADM

## 2020-02-16 RX ADMIN — PREDNISONE 15 MG: 10 TABLET ORAL at 08:08

## 2020-02-16 RX ADMIN — ATORVASTATIN CALCIUM 20 MG: 20 TABLET, FILM COATED ORAL at 08:08

## 2020-02-16 RX ADMIN — SENNOSIDES 8.6 MG: 8.6 TABLET, FILM COATED ORAL at 08:08

## 2020-02-16 RX ADMIN — Medication 10 ML: at 20:44

## 2020-02-16 RX ADMIN — SULFASALAZINE 1000 MG: 500 TABLET ORAL at 20:44

## 2020-02-16 RX ADMIN — NALOXEGOL OXALATE 12.5 MG: 12.5 TABLET, FILM COATED ORAL at 08:08

## 2020-02-16 RX ADMIN — HYDROCODONE BITARTRATE AND ACETAMINOPHEN 1 TABLET: 7.5; 325 TABLET ORAL at 18:08

## 2020-02-16 RX ADMIN — POLYETHYLENE GLYCOL 3350 17 G: 17 POWDER, FOR SOLUTION ORAL at 08:09

## 2020-02-16 RX ADMIN — ORPHENADRINE CITRATE 60 MG: 30 INJECTION INTRAMUSCULAR; INTRAVENOUS at 11:38

## 2020-02-16 RX ADMIN — HYDROCODONE BITARTRATE AND ACETAMINOPHEN 1 TABLET: 7.5; 325 TABLET ORAL at 04:41

## 2020-02-16 RX ADMIN — CALCIUM CARBONATE-VITAMIN D TAB 500 MG-200 UNIT 1 TABLET: 500-200 TAB at 08:08

## 2020-02-16 RX ADMIN — ORPHENADRINE CITRATE 60 MG: 30 INJECTION INTRAMUSCULAR; INTRAVENOUS at 00:49

## 2020-02-16 RX ADMIN — HYDROCODONE BITARTRATE AND ACETAMINOPHEN 1 TABLET: 7.5; 325 TABLET ORAL at 11:37

## 2020-02-16 RX ADMIN — SULFASALAZINE 1000 MG: 500 TABLET ORAL at 08:09

## 2020-02-16 RX ADMIN — SENNOSIDES 8.6 MG: 8.6 TABLET, FILM COATED ORAL at 20:44

## 2020-02-16 RX ADMIN — PANTOPRAZOLE SODIUM 40 MG: 40 TABLET, DELAYED RELEASE ORAL at 06:32

## 2020-02-16 RX ADMIN — Medication 10 ML: at 08:09

## 2020-02-16 RX ADMIN — DOCUSATE SODIUM 200 MG: 100 CAPSULE, LIQUID FILLED ORAL at 08:08

## 2020-02-16 ASSESSMENT — PAIN DESCRIPTION - PROGRESSION: CLINICAL_PROGRESSION: NOT CHANGED

## 2020-02-16 ASSESSMENT — PAIN SCALES - GENERAL
PAINLEVEL_OUTOF10: 2
PAINLEVEL_OUTOF10: 4
PAINLEVEL_OUTOF10: 4
PAINLEVEL_OUTOF10: 7
PAINLEVEL_OUTOF10: 4
PAINLEVEL_OUTOF10: 2
PAINLEVEL_OUTOF10: 3
PAINLEVEL_OUTOF10: 2
PAINLEVEL_OUTOF10: 4

## 2020-02-16 ASSESSMENT — PAIN DESCRIPTION - ONSET: ONSET: ON-GOING

## 2020-02-16 ASSESSMENT — PAIN - FUNCTIONAL ASSESSMENT: PAIN_FUNCTIONAL_ASSESSMENT: PREVENTS OR INTERFERES SOME ACTIVE ACTIVITIES AND ADLS

## 2020-02-16 ASSESSMENT — PAIN DESCRIPTION - DESCRIPTORS: DESCRIPTORS: ACHING;CONSTANT

## 2020-02-16 ASSESSMENT — PAIN DESCRIPTION - ORIENTATION: ORIENTATION: LOWER;MID

## 2020-02-16 ASSESSMENT — PAIN DESCRIPTION - PAIN TYPE: TYPE: SURGICAL PAIN

## 2020-02-16 ASSESSMENT — PAIN DESCRIPTION - LOCATION: LOCATION: BACK

## 2020-02-16 ASSESSMENT — PAIN DESCRIPTION - FREQUENCY: FREQUENCY: CONTINUOUS

## 2020-02-16 NOTE — PROGRESS NOTES
posterior lower lumbar fusion. Images demonstrate a surgical instrument directed towards the intervertebral disc space at L3-L4. The final image demonstrates posterior fusion hardware extending from L3 through S1. Fluoro Time: 1 min 19 secs Mode of Operation: norm Performing Physician:Dr mata Tillman. Posterior lumbar fusion revision is demonstrated. Please refer to operative report for further details. **This report has been created using voice recognition software. It may contain minor errors which are inherent in voice recognition technology. ** Final report electronically signed by Dr. Keisha Wheeler on 2/13/2020 2:33 PM    Ct Head Wo Contrast    Result Date: 2/11/2020  PROCEDURE: CT HEAD WO CONTRAST CLINICAL INFORMATION: weakness. COMPARISON: Noncontrast brain CT dated 8/10/2019 TECHNIQUE: Noncontrast 5 mm axial images were obtained through the brain. Sagittal and coronal reconstructions were obtained and reviewed. All CT scans at this facility use dose modulation, iterative reconstruction, and/or weight-based dosing when appropriate to reduce radiation dose to as low as reasonably achievable. FINDINGS: Brain: There is no acute ischemic infarct, hemorrhage, midline shift, mass, or mass effect. There is no focal abnormality of brain parenchymal attenuation. Ventricles/basal cisterns: The ventricles and cisterns are of appropriate size and configuration for the patient's age. No evidence of obstructive hydrocephalus. Skull base/calvarium/osseous structures: Unremarkable Soft tissues: Unremarkable Intraorbital contents: Unremarkable Sinuses: Unremarkable; the imaged sinuses are clear without evidence of mucosal thickening or fluid levels. Mastoids: Unremarkable; the mastoid air cells are clear. No acute ischemic infarct, hemorrhage, or mass effect. **This report has been created using voice recognition software. It may contain minor errors which are inherent in voice recognition technology. ** Final report electronically signed by Dr. Bryan Warren on 2/11/2020 8:50 PM    Mri Lumbar Spine Wo Contrast    Result Date: 2/12/2020  PROCEDURE: MRI LUMBAR SPINE WO CONTRAST CLINICAL INFORMATION: pain. Lower extremity weakness. Thighs are numb and painful. Difficulty walking for 2 to 3 weeks. COMPARISON: Lumbar spine CT 2/11/2020. Lumbar spine MRI 12/31/2014. TECHNIQUE: Sagittal and axial T1 and T2-weighted images were obtained through the lumbar spine. FINDINGS: The patient has had lumbar fusion and laminectomy at L4-S1. There is stable straightening of the normal lumbar lordosis. There is new loss of disc height at the L3-4 level. This is the level above the fusion. There is edema in the endplates. There does appear fusion across the discs at the L4-5 and L5-S1 levels. There are no compression fractures. No pars defects are noted. There is normal signal in the disks above the L3-4 level. The distal spinal cord, conus medullaris and cauda equina are normal. There are no gross abnormalities in the visualized aspects of the distal thoracic spine. On the axial images, at T12-L1, there are no degenerative changes. The disc is normal. There is no spinal canal or foraminal stenosis. At L1-L2, there are mild facet degenerative changes. The disc is normal. There is no spinal canal or foraminal stenosis. At L2-L3, there are mild facet degenerative changes. There is some thickening of ligamentum flavum. There is mild stenosis of the thecal sac. There is no foraminal stenosis. At L3-L4, there is loss of disc height. There is a large central disc protrusion which migrates superiorly in the left lateral recess. There are moderate severity facet degenerative changes. There is thickening of ligamentum flavum. There is severe spinal canal stenosis. There is compression of the cauda equina. This is new. There is moderate severity bilateral foraminal stenosis. At L4-L5, there has been posterior decompression.  There is no stenosis of the thecal sac. There is moderate severity right foraminal stenosis. At L5-S1, there has been posterior decompression. There is no stenosis of the thecal sac. There is moderate severity bilateral foraminal stenosis. There are no suspicious findings in the visualized aspects of the retroperitoneum and paraspinal soft tissues. 1. Severe spinal canal stenosis with compression of the cauda equina at the L3-4 level due to loss of disc height and a large central/left central disc protrusion which migrates superiorly. There are also facet degenerative changes at this level. There is moderate severity bilateral foraminal stenosis. 2. Moderate severity bilateral foraminal stenosis at the L5-S1 level and moderate severity right foraminal stenosis at the L4-5 level. These findings were telephoned to [facility] the patient's nurse on 8A at [address] AM on 2/12/2020 . **This report has been created using voice recognition software. It may contain minor errors which are inherent in voice recognition technology. ** Final report electronically signed by Dr. Rut Massey on 2/12/2020 8:12 AM    Ct Abdomen Pelvis W Iv Contrast Additional Contrast? None    Result Date: 2/11/2020  PROCEDURE: CT ABDOMEN PELVIS W IV CONTRAST CLINICAL INFORMATION: low back pain . COMPARISON: None. TECHNIQUE: 5 mm axial CT images were obtained through the abdomen and pelvis after the administration of intravenous contrast. Coronal and sagittal reconstructions were obtained. All CT scans at this facility use dose modulation, iterative reconstruction, and/or weight-based dosing when appropriate to reduce radiation dose to as low as reasonably achievable. FINDINGS: Lower chest: There are bibasilar interstitial infiltrates. Differential diagnosis includes pulmonary edema, atypical pneumonia, and/or fibrosis. There are 1 cm and smaller bilateral lower lobe bullae. Liver:  There are a few 5 mm and smaller hypodense lesions which are difficult to characterize, possibly representing cysts. . There is no solid appearing liver mass or intrahepatic biliary dilatation. Gallbladder/Biliary tree: Unremarkable. No calcified gallstones. No extrahepatic biliary dilatation. Spleen: Unremarkable. No splenomegaly. Pancreas: Unremarkable. No mass or pancreatic ductal dilatation. No findings to suggest acute pancreatitis. Adrenal glands: Unremarkable. No mass. Kidneys and ureters: There is an 8 mm probable cyst of the upper pole of the left kidney. The kidneys are otherwise unremarkable. . No hydroureteronephrosis. No renal or ureteral calculi. No findings to suggest acute pyelonephritis. Stomach, small bowel, and colon: Stomach and duodenum are unremarkable. There is colonic diverticulosis without diverticulitis. Small bowel and colon are otherwise unremarkable. There is no evidence of bowel wall thickening. No evidence of bowel obstruction. Appendix: The appendix is not visualized however there are no findings to suggest acute appendicitis. Omentum and mesentery: Unremarkable Aorta, vascular: No aortic aneurysm or dissection. There are aortoiliofemoral atherosclerotic calcifications. Reproductive: Unremarkable Bladder: Unremarkable. No wall thickening or obvious mass. No calcified stones. Intraperitoneal/retroperitoneal Space: There is no ascites, abscess, or mass. No pneumoperitoneum. There is mild bilateral external iliac adenopathy, likely reactive. Abdominal and pelvic body wall soft tissues: There is mild bilateral inguinal adenopathy, likely reactive. Musculoskeletal structures: Patient is status post L4 and L5 laminectomies, L4-5 and L5-S1 discectomies with bilateral L4-S1 pedicle screw fusion. There is a defect in the posterior medial left ilium likely representing a bone graft donor site. No acute traumatic abnormality of the solid or hollow viscera of the abdomen and pelvis. No acute inflammatory or infectious process in the abdomen or pelvis.  Colonic diverticulosis without radiation dose to as low as reasonably achievable. FINDINGS: There are 5  non-rib-bearing lumbar vertebrae. The patient is status post L4-5 and L5-S1 discectomies with disc prostheses and bilateral L4-S1 pedicle screw fusion. Patient also status post L4 and L5 laminectomies. There is no fracture. There is no subluxation. There is moderate L3-4 disc space narrowing consistent with degenerative disc disease. There is mild multilevel endplate spondylosis from L3 through S1. The paravertebral soft tissues are unremarkable. The SI joints are unremarkable. There is a defect in the posterior medial left ilium likely representing a bone graft donor site. At L1-2, there is no annular bulge, spinal stenosis, focal disc protrusion, or nerve root impingement. At L2-3, there is no annular bulge, spinal stenosis, focal disc protrusion, or nerve root impingement. At L3-4, there is a possible annular bulge and moderate spinal stenosis. There is probable impingement upon the right L3 nerve root. . At L4-5, evaluation is limited due to extensive beam hardening artifact. There is no spinal stenosis. No definite L4 nerve root impingement. . At L5-S1, evaluation is limited due to extensive beam hardening artifact. There is no spinal stenosis. No definite nerve root impingement. .     Status post L4-5 and L5-S1 discectomies with disc prostheses and pedicle screw fusion and L4 and L5 laminectomies. No fracture or subluxation. Moderate L3-4 degenerative disc disease. L3-4 possible annular bulge and moderate spinal stenosis with probable impingement upon the right L3 nerve root. See the accompanying abdomen pelvis CT report for discussion of nonspine findings. **This report has been created using voice recognition software. It may contain minor errors which are inherent in voice recognition technology. ** Final report electronically signed by Dr. Aly Carrizales on 2/11/2020 8:56 PM    Mri Brain Wo Contrast    Result Date:

## 2020-02-17 LAB
ANION GAP SERPL CALCULATED.3IONS-SCNC: 13 MEQ/L (ref 8–16)
BLOOD CULTURE, ROUTINE: NORMAL
BLOOD CULTURE, ROUTINE: NORMAL
BUN BLDV-MCNC: 6 MG/DL (ref 7–22)
CALCIUM SERPL-MCNC: 9 MG/DL (ref 8.5–10.5)
CHLORIDE BLD-SCNC: 104 MEQ/L (ref 98–111)
CO2: 26 MEQ/L (ref 23–33)
CREAT SERPL-MCNC: 0.5 MG/DL (ref 0.4–1.2)
ERYTHROCYTE [DISTWIDTH] IN BLOOD BY AUTOMATED COUNT: 15 % (ref 11.5–14.5)
ERYTHROCYTE [DISTWIDTH] IN BLOOD BY AUTOMATED COUNT: 51.8 FL (ref 35–45)
GFR SERPL CREATININE-BSD FRML MDRD: > 90 ML/MIN/1.73M2
GLUCOSE BLD-MCNC: 90 MG/DL (ref 70–108)
HCT VFR BLD CALC: 29.2 % (ref 37–47)
HEMOGLOBIN: 8.8 GM/DL (ref 12–16)
MCH RBC QN AUTO: 28.6 PG (ref 26–33)
MCHC RBC AUTO-ENTMCNC: 30.1 GM/DL (ref 32.2–35.5)
MCV RBC AUTO: 94.8 FL (ref 81–99)
PLATELET # BLD: 525 THOU/MM3 (ref 130–400)
PMV BLD AUTO: 9.5 FL (ref 9.4–12.4)
POTASSIUM SERPL-SCNC: 3.9 MEQ/L (ref 3.5–5.2)
RBC # BLD: 3.08 MILL/MM3 (ref 4.2–5.4)
SODIUM BLD-SCNC: 143 MEQ/L (ref 135–145)
SOLUBLE TRANSFERRIN RECEPT: 2.7 MG/L (ref 1.9–4.4)
WBC # BLD: 10.5 THOU/MM3 (ref 4.8–10.8)

## 2020-02-17 PROCEDURE — 94760 N-INVAS EAR/PLS OXIMETRY 1: CPT

## 2020-02-17 PROCEDURE — 97116 GAIT TRAINING THERAPY: CPT

## 2020-02-17 PROCEDURE — 97110 THERAPEUTIC EXERCISES: CPT

## 2020-02-17 PROCEDURE — 6370000000 HC RX 637 (ALT 250 FOR IP): Performed by: NEUROLOGICAL SURGERY

## 2020-02-17 PROCEDURE — 6370000000 HC RX 637 (ALT 250 FOR IP): Performed by: INTERNAL MEDICINE

## 2020-02-17 PROCEDURE — 1200000000 HC SEMI PRIVATE

## 2020-02-17 PROCEDURE — 85027 COMPLETE CBC AUTOMATED: CPT

## 2020-02-17 PROCEDURE — 99232 SBSQ HOSP IP/OBS MODERATE 35: CPT | Performed by: INTERNAL MEDICINE

## 2020-02-17 PROCEDURE — 80048 BASIC METABOLIC PNL TOTAL CA: CPT

## 2020-02-17 PROCEDURE — 97535 SELF CARE MNGMENT TRAINING: CPT

## 2020-02-17 PROCEDURE — 2580000003 HC RX 258: Performed by: NEUROLOGICAL SURGERY

## 2020-02-17 PROCEDURE — 36415 COLL VENOUS BLD VENIPUNCTURE: CPT

## 2020-02-17 RX ORDER — TIZANIDINE 4 MG/1
4 TABLET ORAL EVERY 6 HOURS PRN
Status: DISCONTINUED | OUTPATIENT
Start: 2020-02-17 | End: 2020-02-21 | Stop reason: HOSPADM

## 2020-02-17 RX ADMIN — CYCLOBENZAPRINE 10 MG: 10 TABLET, FILM COATED ORAL at 08:47

## 2020-02-17 RX ADMIN — TIZANIDINE 4 MG: 4 TABLET ORAL at 17:23

## 2020-02-17 RX ADMIN — SULFASALAZINE 1000 MG: 500 TABLET ORAL at 20:48

## 2020-02-17 RX ADMIN — HYDROCODONE BITARTRATE AND ACETAMINOPHEN 1 TABLET: 7.5; 325 TABLET ORAL at 09:37

## 2020-02-17 RX ADMIN — SENNOSIDES 8.6 MG: 8.6 TABLET, FILM COATED ORAL at 20:48

## 2020-02-17 RX ADMIN — CALCIUM CARBONATE-VITAMIN D TAB 500 MG-200 UNIT 1 TABLET: 500-200 TAB at 11:05

## 2020-02-17 RX ADMIN — PANTOPRAZOLE SODIUM 40 MG: 40 TABLET, DELAYED RELEASE ORAL at 06:05

## 2020-02-17 RX ADMIN — HYDROCODONE BITARTRATE AND ACETAMINOPHEN 1 TABLET: 7.5; 325 TABLET ORAL at 15:47

## 2020-02-17 RX ADMIN — SULFASALAZINE 1000 MG: 500 TABLET ORAL at 08:48

## 2020-02-17 RX ADMIN — LOSARTAN POTASSIUM 50 MG: 50 TABLET, FILM COATED ORAL at 11:06

## 2020-02-17 RX ADMIN — Medication 10 ML: at 20:48

## 2020-02-17 RX ADMIN — DOCUSATE SODIUM 200 MG: 100 CAPSULE, LIQUID FILLED ORAL at 08:48

## 2020-02-17 RX ADMIN — TIZANIDINE 4 MG: 4 TABLET ORAL at 11:05

## 2020-02-17 RX ADMIN — HYDROCODONE BITARTRATE AND ACETAMINOPHEN 1 TABLET: 7.5; 325 TABLET ORAL at 03:44

## 2020-02-17 RX ADMIN — Medication 10 ML: at 08:50

## 2020-02-17 RX ADMIN — ATORVASTATIN CALCIUM 20 MG: 20 TABLET, FILM COATED ORAL at 11:05

## 2020-02-17 RX ADMIN — SENNOSIDES 8.6 MG: 8.6 TABLET, FILM COATED ORAL at 08:48

## 2020-02-17 RX ADMIN — NALOXEGOL OXALATE 12.5 MG: 12.5 TABLET, FILM COATED ORAL at 08:48

## 2020-02-17 RX ADMIN — HYDROCODONE BITARTRATE AND ACETAMINOPHEN 1 TABLET: 7.5; 325 TABLET ORAL at 22:43

## 2020-02-17 RX ADMIN — POLYETHYLENE GLYCOL 3350 17 G: 17 POWDER, FOR SOLUTION ORAL at 11:06

## 2020-02-17 RX ADMIN — PREDNISONE 15 MG: 10 TABLET ORAL at 08:48

## 2020-02-17 ASSESSMENT — PAIN SCALES - GENERAL
PAINLEVEL_OUTOF10: 0
PAINLEVEL_OUTOF10: 5
PAINLEVEL_OUTOF10: 5
PAINLEVEL_OUTOF10: 3
PAINLEVEL_OUTOF10: 2
PAINLEVEL_OUTOF10: 4
PAINLEVEL_OUTOF10: 6

## 2020-02-17 ASSESSMENT — PAIN DESCRIPTION - FREQUENCY: FREQUENCY: CONTINUOUS

## 2020-02-17 ASSESSMENT — PAIN DESCRIPTION - DESCRIPTORS: DESCRIPTORS: ACHING;CONSTANT

## 2020-02-17 ASSESSMENT — PAIN DESCRIPTION - LOCATION: LOCATION: BACK

## 2020-02-17 ASSESSMENT — PAIN DESCRIPTION - ORIENTATION: ORIENTATION: LOWER;MID

## 2020-02-17 ASSESSMENT — PAIN DESCRIPTION - PROGRESSION: CLINICAL_PROGRESSION: GRADUALLY IMPROVING

## 2020-02-17 ASSESSMENT — PAIN DESCRIPTION - PAIN TYPE: TYPE: SURGICAL PAIN

## 2020-02-17 ASSESSMENT — PAIN - FUNCTIONAL ASSESSMENT: PAIN_FUNCTIONAL_ASSESSMENT: PREVENTS OR INTERFERES SOME ACTIVE ACTIVITIES AND ADLS

## 2020-02-17 ASSESSMENT — PAIN DESCRIPTION - ONSET: ONSET: ON-GOING

## 2020-02-17 NOTE — PLAN OF CARE
Problem: Falls - Risk of:  Goal: Will remain free from falls  Description  Will remain free from falls  2/17/2020 0119 by Emelia Fraire RN  Outcome: Ongoing  Note:   Patient remained free of falls. Call light within reach and used appropriately. Bed alarmed and in lowest position, side rails up x2, personal items within reach and non skid socks worn when ambulating. Ambulates with 1A and walker tolerating well. Working with PT/OT. Problem: Falls - Risk of:  Goal: Absence of physical injury  Description  Absence of physical injury  Outcome: Ongoing  Note:   Patient remained free of any physical injury. Problem: Pain:  Goal: Pain level will decrease  Description  Pain level will decrease  2/17/2020 0119 by Emelia Fraire RN  Outcome: Ongoing  Note:   Patient rates pain 2/10 with a pain goal of 2/10. Pain controlled with medication and repositioning. Patient satisfied. Problem: Risk for Impaired Skin Integrity  Goal: Tissue integrity - skin and mucous membranes  Description  Structural intactness and normal physiological function of skin and  mucous membranes. 2/17/2020 0119 by Emelia Fraire RN  Outcome: Ongoing  Note:   No signs of new skin breakdown with each assessment. Skin remains warm, dry, intact. Mucous membranes pink & moist. Patient understands the importance of frequent repositioning in order to prevent any skin breakdown. Dressing in place to back surgical site clean, dry, and intact. Problem: Nutrition  Goal: Optimal nutrition therapy  2/17/2020 0119 by Emelia Fraire RN  Outcome: Ongoing  Note:   Patient on low sodium 2 GM; carb control diet tolerating well. Denies any n/v.     Problem: Musculor/Skeletal Functional Status  Goal: Highest potential functional level  2/17/2020 0119 by Emelia Fraire RN  Outcome: Ongoing  Note:   Ambulates with 1A using walker and gait belt tolerating fairly well. Some weakness in legs working with PT/OT.      Problem: Discharge Planning:  Goal:

## 2020-02-17 NOTE — PROGRESS NOTES
stenosis with compression of the cauda equina at the level of L3-L4 s/p decompressive laminectomy L3-L4 on 2/13:    SUBJECTIVE     The patient is a 62 y.o. female w/ a PMH of rheumatoid arthritis who presented to the [facility] ED on 2/11/2020 with bilateral LE weakness, numbness and pain associated with difficulty with ambulation in the setting of a history of lumbar fusion. Her symptoms were not associated with bladder or bowel incontinence. - Numbness at bilateral LE improving Bilateral LE weakness improving. Constipation relieved. BP improved      OBJECTIVE     Medications:  Reviewed    Infusion Medications   Scheduled Medications    potassium replacement protocol   Other RX Placeholder    magnesium replacement protocol   Other RX Placeholder    naloxegol  12.5 mg Oral QAM    senna  1 tablet Oral BID    docusate sodium  200 mg Oral Daily    polyethylene glycol  17 g Oral Daily    sodium chloride flush  10 mL Intravenous 2 times per day    losartan  50 mg Oral Daily    And    [Held by provider] hydrochlorothiazide  12.5 mg Oral Daily    lidocaine  1 patch Transdermal Daily    atorvastatin  20 mg Oral Daily    calcium-vitamin D  1 tablet Oral Daily    pantoprazole  40 mg Oral QAM AC    sulfaSALAzine  1,000 mg Oral BID    predniSONE  15 mg Oral Daily     PRN Meds: tiZANidine, bisacodyl, potassium chloride **OR** potassium alternative oral replacement **OR** potassium chloride, sodium chloride flush, ondansetron, cyclobenzaprine, ketorolac, magnesium hydroxide, acetaminophen, HYDROcodone-acetaminophen    Ins and outs:      Intake/Output Summary (Last 24 hours) at 2/17/2020 0843  Last data filed at 2/17/2020 0341  Gross per 24 hour   Intake 440 ml   Output --   Net 440 ml       Physical Examination     BP (!) 121/59   Pulse 90   Temp 98.4 °F (36.9 °C) (Oral)   Resp 16   Ht 5' 7\" (1.702 m)   Wt 183 lb 1.6 oz (83.1 kg)   SpO2 100%   BMI 28.68 kg/m²     General appearance: No apparent distress.  Lying in bed. Sister at bedside  HEENT: Extraocular motion intact. Trachea midline. Right facial swelling improved  Neck: Supple  Respiratory:  CTA bilaterally without rales/wheezes/rhonchi. Cardiovascular: RRR. 3/6 MADELIN. No rubs or gallops. Abdomen: Soft, non-tender, non-distended with normal bowel sounds. Musculoskeletal: Patient is moving extremities x 4 spontaneously. Arthritic changes of the bilateral hands  Neurologic: Grossly non focal. CN: II-XII intact. No sensory deficits detected on palpation of anterior thighs   Psychiatric: Alert and oriented  Vascular: Dorsalis pedis palpable bilaterally. Radial pulses palpable bilaterally. No peripheral edema  Skin:  No visible rashes or lesions. Labs     Recent Labs     02/15/20  0802 02/16/20  0819 02/17/20  0759   WBC 8.3 10.0 10.5   HGB 8.0* 8.8* 8.8*   HCT 27.3* 29.0* 29.2*   * 476* 525*     Recent Labs     02/15/20  0802 02/16/20  0819 02/17/20  0759    144 143   K 3.1* 3.5 3.9    107 104   CO2 24 26 26   BUN 5* 4* 6*   CREATININE 0.4 0.4 0.5   CALCIUM 8.5 8.7 9.0     No results for input(s): AST, ALT, BILIDIR, BILITOT, ALKPHOS in the last 72 hours. No results for input(s): INR in the last 72 hours. No results for input(s): Murleen Iba in the last 72 hours. Urinalysis:      Lab Results   Component Value Date    NITRU NEGATIVE 02/11/2020    WBCUA 0-2 02/11/2020    BACTERIA NONE SEEN 02/11/2020    RBCUA 3-5 02/11/2020    BLOODU NEGATIVE 02/11/2020    SPECGRAV 1.029 05/24/2013    GLUCOSEU NEGATIVE 02/11/2020       Diagnostic imaging/procedures     Xr Lumbar Spine (2-3 Views)    Result Date: 2/13/2020  PROCEDURE: XR LUMBAR SPINE (2-3 VIEWS) CLINICAL INFORMATION: surgery COMPARISON: 10/20/2014 TECHNIQUE:  10 fluoroscopic intraoperative views of the lumbar spine  were obtained. FINDINGS: Fluoroscopic intraoperative views of the lumbar spine depict revision of posterior lower lumbar fusion.  Images demonstrate a surgical instrument Spine Wo Contrast    Result Date: 2/12/2020  PROCEDURE: MRI LUMBAR SPINE WO CONTRAST CLINICAL INFORMATION: pain. Lower extremity weakness. Thighs are numb and painful. Difficulty walking for 2 to 3 weeks. COMPARISON: Lumbar spine CT 2/11/2020. Lumbar spine MRI 12/31/2014. TECHNIQUE: Sagittal and axial T1 and T2-weighted images were obtained through the lumbar spine. FINDINGS: The patient has had lumbar fusion and laminectomy at L4-S1. There is stable straightening of the normal lumbar lordosis. There is new loss of disc height at the L3-4 level. This is the level above the fusion. There is edema in the endplates. There does appear fusion across the discs at the L4-5 and L5-S1 levels. There are no compression fractures. No pars defects are noted. There is normal signal in the disks above the L3-4 level. The distal spinal cord, conus medullaris and cauda equina are normal. There are no gross abnormalities in the visualized aspects of the distal thoracic spine. On the axial images, at T12-L1, there are no degenerative changes. The disc is normal. There is no spinal canal or foraminal stenosis. At L1-L2, there are mild facet degenerative changes. The disc is normal. There is no spinal canal or foraminal stenosis. At L2-L3, there are mild facet degenerative changes. There is some thickening of ligamentum flavum. There is mild stenosis of the thecal sac. There is no foraminal stenosis. At L3-L4, there is loss of disc height. There is a large central disc protrusion which migrates superiorly in the left lateral recess. There are moderate severity facet degenerative changes. There is thickening of ligamentum flavum. There is severe spinal canal stenosis. There is compression of the cauda equina. This is new. There is moderate severity bilateral foraminal stenosis. At L4-L5, there has been posterior decompression. There is no stenosis of the thecal sac. There is moderate severity right foraminal stenosis.  At L5-S1, there dilatation. Gallbladder/Biliary tree: Unremarkable. No calcified gallstones. No extrahepatic biliary dilatation. Spleen: Unremarkable. No splenomegaly. Pancreas: Unremarkable. No mass or pancreatic ductal dilatation. No findings to suggest acute pancreatitis. Adrenal glands: Unremarkable. No mass. Kidneys and ureters: There is an 8 mm probable cyst of the upper pole of the left kidney. The kidneys are otherwise unremarkable. . No hydroureteronephrosis. No renal or ureteral calculi. No findings to suggest acute pyelonephritis. Stomach, small bowel, and colon: Stomach and duodenum are unremarkable. There is colonic diverticulosis without diverticulitis. Small bowel and colon are otherwise unremarkable. There is no evidence of bowel wall thickening. No evidence of bowel obstruction. Appendix: The appendix is not visualized however there are no findings to suggest acute appendicitis. Omentum and mesentery: Unremarkable Aorta, vascular: No aortic aneurysm or dissection. There are aortoiliofemoral atherosclerotic calcifications. Reproductive: Unremarkable Bladder: Unremarkable. No wall thickening or obvious mass. No calcified stones. Intraperitoneal/retroperitoneal Space: There is no ascites, abscess, or mass. No pneumoperitoneum. There is mild bilateral external iliac adenopathy, likely reactive. Abdominal and pelvic body wall soft tissues: There is mild bilateral inguinal adenopathy, likely reactive. Musculoskeletal structures: Patient is status post L4 and L5 laminectomies, L4-5 and L5-S1 discectomies with bilateral L4-S1 pedicle screw fusion. There is a defect in the posterior medial left ilium likely representing a bone graft donor site. No acute traumatic abnormality of the solid or hollow viscera of the abdomen and pelvis. No acute inflammatory or infectious process in the abdomen or pelvis. Colonic diverticulosis without evidence of diverticulitis. No evidence of bowel obstruction.  Mild bilateral external iliac lumbar vertebrae. The patient is status post L4-5 and L5-S1 discectomies with disc prostheses and bilateral L4-S1 pedicle screw fusion. Patient also status post L4 and L5 laminectomies. There is no fracture. There is no subluxation. There is moderate L3-4 disc space narrowing consistent with degenerative disc disease. There is mild multilevel endplate spondylosis from L3 through S1. The paravertebral soft tissues are unremarkable. The SI joints are unremarkable. There is a defect in the posterior medial left ilium likely representing a bone graft donor site. At L1-2, there is no annular bulge, spinal stenosis, focal disc protrusion, or nerve root impingement. At L2-3, there is no annular bulge, spinal stenosis, focal disc protrusion, or nerve root impingement. At L3-4, there is a possible annular bulge and moderate spinal stenosis. There is probable impingement upon the right L3 nerve root. . At L4-5, evaluation is limited due to extensive beam hardening artifact. There is no spinal stenosis. No definite L4 nerve root impingement. . At L5-S1, evaluation is limited due to extensive beam hardening artifact. There is no spinal stenosis. No definite nerve root impingement. .     Status post L4-5 and L5-S1 discectomies with disc prostheses and pedicle screw fusion and L4 and L5 laminectomies. No fracture or subluxation. Moderate L3-4 degenerative disc disease. L3-4 possible annular bulge and moderate spinal stenosis with probable impingement upon the right L3 nerve root. See the accompanying abdomen pelvis CT report for discussion of nonspine findings. **This report has been created using voice recognition software. It may contain minor errors which are inherent in voice recognition technology. ** Final report electronically signed by Dr. Craig Caraballo on 2/11/2020 8:56 PM    Mri Brain Wo Contrast    Result Date: 2/12/2020  PROCEDURE: MRI BRAIN WO CONTRAST CLINICAL INFORMATION weakness. Lower extremity weakness.  Thighs are numb and painful. COMPARISON: Head CT 2/11/2020. TECHNIQUE: Multiplanar and multiple spin echo MRI images were obtained of the brain without contrast. FINDINGS: The diffusion-weighted images are normal.  The brain volume is normal. There is minimal signal hyperintensity scattered in the white matter of the brain suggestive of minimal severity chronic small vessel ischemic changes. There are no intra-or extra-axial collections. There is no hydrocephalus, midline shift or mass effect. There is mineralization in the medial aspects of the basal ganglia bilaterally. No other areas of susceptibility artifact are present. The major intracranial vascular flow voids are present. The midline craniocervical junction structures are normal.  There is a partially empty sella turcica. The brainstem appears normal.     1. No acute findings. 2. Minimal severity chronic small vessel ischemic changes. **This report has been created using voice recognition software. It may contain minor errors which are inherent in voice recognition technology. ** Final report electronically signed by Dr. Fara Frazier on 2/12/2020 7:44 AM        DVT prophylaxis: [] Lovenox                                 [x] SCDs                                 [] SQ Heparin                                 [] Encourage ambulation           [] Already on Anticoagulation     Disposition:    [x] Home       [] TCU       [] Rehab       [] Psych       [] SNF       [] [place]       [] Other-

## 2020-02-17 NOTE — PROGRESS NOTES
[facility]  INPATIENT PHYSICAL THERAPY  DAILY NOTE  [facility] ORTHOPEDICS 7K - 7K-28/028-A    Time In: 2617  Time Out: 0840  Timed Code Treatment Minutes: 23 Minutes  Minutes: 23          Date: 2020  Patient Name: Mayra Ellison,  Gender:  female        MRN: [id number]  : 1965  (62 y.o.)     Referring Practitioner: Dr. Fabiana Thornton     Additional Pertinent Hx: admit with above diagnosis, s/p LUMBAR DECOMPRESSION, DISC REMOVAL BETWEEN L3-4, And extension of previous L4-S1 instrumentation and fusion L3 on 20     Prior Level of Function:  Lives With: Spouse  Type of Home: House  Home Layout: One level  Home Access: Stairs to enter with rails  Entrance Stairs - Number of Steps: 1 platform step   Home Equipment: Cane, Standard walker   Bathroom Shower/Tub: Tub/Shower unit(possibly has a shower chair )  Bathroom Toilet: Standard  Bathroom Equipment: Toilet raiser(she thinks she has )  Bathroom Accessibility: Accessible    ADL Assistance: Independent  Homemaking Assistance: Independent  Ambulation Assistance: Independent  Transfer Assistance: Independent  IADL Comments: pt compelting all homemaking tasks   Additional Comments: pt indep all ADL tasks. Restrictions/Precautions:  Restrictions/Precautions: Fall Risk, General Precautions  Position Activity Restriction  Spinal Precautions: No Bending, No Lifting, No Twisting    SUBJECTIVE: RN approved session. Pt sitting at EOB, pleasant and agreeable to therapy. PAIN: 7/10: Back pain. Pt in tears with there ex but wanted to continue because \"I just want to get better\"    OBJECTIVE:    Transfers:  Sit to Stand: Minimal Assistance, From EOB and from toilet. Cuing for hand placement. Stand to Sit:Minimal Assistance    Ambulation:  Contact Guard Assistance, Minimal Assistance  Distance: 45 feet x1   Surface: Level Tile  Device:Rolling Walker  Gait Deviations:   Forward Flexed Posture, Slow Lizz, Decreased Step Length Bilaterally, Decreased Weight

## 2020-02-18 LAB
ANION GAP SERPL CALCULATED.3IONS-SCNC: 15 MEQ/L (ref 8–16)
BUN BLDV-MCNC: 7 MG/DL (ref 7–22)
CALCIUM SERPL-MCNC: 9.4 MG/DL (ref 8.5–10.5)
CHLORIDE BLD-SCNC: 105 MEQ/L (ref 98–111)
CO2: 24 MEQ/L (ref 23–33)
CREAT SERPL-MCNC: 0.4 MG/DL (ref 0.4–1.2)
ERYTHROCYTE [DISTWIDTH] IN BLOOD BY AUTOMATED COUNT: 15.6 % (ref 11.5–14.5)
ERYTHROCYTE [DISTWIDTH] IN BLOOD BY AUTOMATED COUNT: 54.4 FL (ref 35–45)
GFR SERPL CREATININE-BSD FRML MDRD: > 90 ML/MIN/1.73M2
GLUCOSE BLD-MCNC: 96 MG/DL (ref 70–108)
HCT VFR BLD CALC: 29.5 % (ref 37–47)
HEMOGLOBIN: 8.7 GM/DL (ref 12–16)
MCH RBC QN AUTO: 28.8 PG (ref 26–33)
MCHC RBC AUTO-ENTMCNC: 29.5 GM/DL (ref 32.2–35.5)
MCV RBC AUTO: 97.7 FL (ref 81–99)
PLATELET # BLD: 537 THOU/MM3 (ref 130–400)
PMV BLD AUTO: 9.8 FL (ref 9.4–12.4)
POTASSIUM SERPL-SCNC: 4 MEQ/L (ref 3.5–5.2)
RBC # BLD: 3.02 MILL/MM3 (ref 4.2–5.4)
SODIUM BLD-SCNC: 144 MEQ/L (ref 135–145)
WBC # BLD: 10.6 THOU/MM3 (ref 4.8–10.8)

## 2020-02-18 PROCEDURE — 6370000000 HC RX 637 (ALT 250 FOR IP): Performed by: NEUROLOGICAL SURGERY

## 2020-02-18 PROCEDURE — 85027 COMPLETE CBC AUTOMATED: CPT

## 2020-02-18 PROCEDURE — 80048 BASIC METABOLIC PNL TOTAL CA: CPT

## 2020-02-18 PROCEDURE — 99232 SBSQ HOSP IP/OBS MODERATE 35: CPT | Performed by: INTERNAL MEDICINE

## 2020-02-18 PROCEDURE — 2580000003 HC RX 258: Performed by: NEUROLOGICAL SURGERY

## 2020-02-18 PROCEDURE — 6360000002 HC RX W HCPCS: Performed by: INTERNAL MEDICINE

## 2020-02-18 PROCEDURE — 97535 SELF CARE MNGMENT TRAINING: CPT

## 2020-02-18 PROCEDURE — 1200000000 HC SEMI PRIVATE

## 2020-02-18 PROCEDURE — 97530 THERAPEUTIC ACTIVITIES: CPT

## 2020-02-18 PROCEDURE — 97110 THERAPEUTIC EXERCISES: CPT

## 2020-02-18 PROCEDURE — 6370000000 HC RX 637 (ALT 250 FOR IP): Performed by: INTERNAL MEDICINE

## 2020-02-18 PROCEDURE — 97116 GAIT TRAINING THERAPY: CPT

## 2020-02-18 PROCEDURE — 2580000003 HC RX 258: Performed by: INTERNAL MEDICINE

## 2020-02-18 PROCEDURE — 36415 COLL VENOUS BLD VENIPUNCTURE: CPT

## 2020-02-18 RX ORDER — BISACODYL 10 MG
10 SUPPOSITORY, RECTAL RECTAL ONCE
Status: DISCONTINUED | OUTPATIENT
Start: 2020-02-18 | End: 2020-02-21 | Stop reason: HOSPADM

## 2020-02-18 RX ORDER — HYDROCODONE BITARTRATE AND ACETAMINOPHEN 5; 325 MG/1; MG/1
1 TABLET ORAL EVERY 6 HOURS PRN
Status: DISCONTINUED | OUTPATIENT
Start: 2020-02-18 | End: 2020-02-21 | Stop reason: HOSPADM

## 2020-02-18 RX ORDER — 0.9 % SODIUM CHLORIDE 0.9 %
500 INTRAVENOUS SOLUTION INTRAVENOUS ONCE
Status: COMPLETED | OUTPATIENT
Start: 2020-02-18 | End: 2020-02-18

## 2020-02-18 RX ADMIN — DOCUSATE SODIUM 200 MG: 100 CAPSULE, LIQUID FILLED ORAL at 08:59

## 2020-02-18 RX ADMIN — SODIUM CHLORIDE 500 ML: 9 INJECTION, SOLUTION INTRAVENOUS at 12:35

## 2020-02-18 RX ADMIN — CALCIUM CARBONATE-VITAMIN D TAB 500 MG-200 UNIT 1 TABLET: 500-200 TAB at 08:53

## 2020-02-18 RX ADMIN — POLYETHYLENE GLYCOL 3350 17 G: 17 POWDER, FOR SOLUTION ORAL at 08:59

## 2020-02-18 RX ADMIN — Medication 10 ML: at 20:01

## 2020-02-18 RX ADMIN — HYDROCODONE BITARTRATE AND ACETAMINOPHEN 1 TABLET: 7.5; 325 TABLET ORAL at 05:00

## 2020-02-18 RX ADMIN — TIZANIDINE 4 MG: 4 TABLET ORAL at 07:51

## 2020-02-18 RX ADMIN — TIZANIDINE 4 MG: 4 TABLET ORAL at 00:45

## 2020-02-18 RX ADMIN — BISACODYL 10 MG: 10 SUPPOSITORY RECTAL at 20:01

## 2020-02-18 RX ADMIN — PREDNISONE 15 MG: 10 TABLET ORAL at 08:53

## 2020-02-18 RX ADMIN — SULFASALAZINE 1000 MG: 500 TABLET ORAL at 20:00

## 2020-02-18 RX ADMIN — SULFASALAZINE 1000 MG: 500 TABLET ORAL at 08:53

## 2020-02-18 RX ADMIN — HYDROCORTISONE SODIUM SUCCINATE 100 MG: 100 INJECTION, POWDER, FOR SOLUTION INTRAMUSCULAR; INTRAVENOUS at 12:36

## 2020-02-18 RX ADMIN — TIZANIDINE 4 MG: 4 TABLET ORAL at 15:10

## 2020-02-18 RX ADMIN — MAGNESIUM HYDROXIDE 30 ML: 2400 SUSPENSION ORAL at 11:46

## 2020-02-18 RX ADMIN — Medication 10 ML: at 12:37

## 2020-02-18 RX ADMIN — PANTOPRAZOLE SODIUM 40 MG: 40 TABLET, DELAYED RELEASE ORAL at 04:59

## 2020-02-18 RX ADMIN — SENNOSIDES 8.6 MG: 8.6 TABLET, FILM COATED ORAL at 08:53

## 2020-02-18 RX ADMIN — SENNOSIDES 8.6 MG: 8.6 TABLET, FILM COATED ORAL at 20:00

## 2020-02-18 RX ADMIN — NALOXEGOL OXALATE 12.5 MG: 12.5 TABLET, FILM COATED ORAL at 08:53

## 2020-02-18 RX ADMIN — ATORVASTATIN CALCIUM 20 MG: 20 TABLET, FILM COATED ORAL at 08:53

## 2020-02-18 ASSESSMENT — PAIN SCALES - GENERAL
PAINLEVEL_OUTOF10: 2
PAINLEVEL_OUTOF10: 3
PAINLEVEL_OUTOF10: 0
PAINLEVEL_OUTOF10: 7
PAINLEVEL_OUTOF10: 2
PAINLEVEL_OUTOF10: 2

## 2020-02-18 ASSESSMENT — PAIN DESCRIPTION - PROGRESSION
CLINICAL_PROGRESSION: NOT CHANGED

## 2020-02-18 ASSESSMENT — PAIN DESCRIPTION - PAIN TYPE
TYPE: ACUTE PAIN;SURGICAL PAIN
TYPE: ACUTE PAIN
TYPE: ACUTE PAIN;SURGICAL PAIN
TYPE: ACUTE PAIN;SURGICAL PAIN

## 2020-02-18 ASSESSMENT — PAIN DESCRIPTION - DESCRIPTORS
DESCRIPTORS: ACHING

## 2020-02-18 ASSESSMENT — PAIN DESCRIPTION - LOCATION
LOCATION: BACK

## 2020-02-18 ASSESSMENT — PAIN - FUNCTIONAL ASSESSMENT
PAIN_FUNCTIONAL_ASSESSMENT: ACTIVITIES ARE NOT PREVENTED

## 2020-02-18 ASSESSMENT — PAIN DESCRIPTION - ONSET
ONSET: ON-GOING

## 2020-02-18 ASSESSMENT — PAIN DESCRIPTION - FREQUENCY
FREQUENCY: CONTINUOUS

## 2020-02-18 ASSESSMENT — PAIN DESCRIPTION - ORIENTATION
ORIENTATION: LOWER;MID
ORIENTATION: LOWER;MID
ORIENTATION: LEFT;MID
ORIENTATION: LOWER;MID

## 2020-02-18 NOTE — PROGRESS NOTES
was guided in 1 set(s) 10 reps of exercise to both lower extremities. Ankle pumps, Glut sets, Quad sets, Heelslides and Hip abduction/adduction. Exercises were completed for increased independence with functional mobility. Functional Outcome Measures: Completed  AM-PAC Inpatient Mobility without Stair Climbing Raw Score : 15  AM-PAC Inpatient without Stair Climbing T-Scale Score : 43.03    ASSESSMENT:  Assessment: Patient progressing toward established goals. and Pt tolerated session well. Limited by decreased strength and decreased mobility. Would benefit from continued therapy at discharge. Activity Tolerance:  Patient tolerance of  treatment: good. Equipment Recommendations:Equipment Needed: Yes  Mobility Devices: Kristofer Cram: Rolling  Discharge Recommendations:  Continue to assess pending progress    Plan: Times per week: 6-7X O  Times per day: Daily  Specific instructions for Next Treatment: therex and mobility with back precautions    Patient Education  Patient Education: Plan of Care, Bed Mobility, Transfers, Gait    Goals:  Patient goals : go home  Short term goals  Time Frame for Short term goals: by discharge  Short term goal 1: bed mobility with S  Short term goal 2: transfer with S  Short term goal 3: amb >100'x1 with walker and S to walk safely in home  Short term goal 4: negotiate platform step with walker and SBA to enter home safely  Long term goals  Time Frame for Long term goals : no LTGs set secondary to short ELOS    Following session, patient left in safe position with all fall risk precautions in place.

## 2020-02-18 NOTE — PROGRESS NOTES
[facility]  Occupational Therapy  Daily Note  Time:   Time In: 1055  Time Out: 1119  Timed Code Treatment Minutes: 24 Minutes  Minutes: 24          Date: 2020  Patient Name: Yun Reeves,   Gender: female      Room: 7-28/028-A  MRN: [id number]  : 1965  (62 y.o.)  Referring Practitioner: Abel Landis MD  Diagnosis: Extremity weakness  Additional Pertinent Hx: Yun Reeves is a 62 y.o. female whopresents to the emergency department from home for fatigue and extremity weakness secondary to back pain. Patient reports that her back pain shoots into her legs. She reports trouble with movement and getting up. Patient reports a history of similar back pain. She states that she has had 2 back surgeries in the past. Patient is on medications for her chronic pain, which she does take regularly. S/P LUMBAR DECOMPRESSION, DISC REMOVAL BETWEEN L3-4, And extension of previous L4-S1 instrumentation and fusion L3.on 2020    Restrictions/Precautions:  Restrictions/Precautions: Fall Risk, General Precautions  Position Activity Restriction  Spinal Precautions: No Bending, No Lifting, No Twisting    SUBJECTIVE: Pt lying supine upon arrival, agreeable to OT session. RN okayed therapy session. PAIN: 3/10: Mid/low Back    COGNITION: WFL    ADL:   No ADL's completed this session. BALANCE:  Sitting Balance:  Stand By Assistance. Standing Balance: Contact Guard Assistance. x1 minute in prep for mobility, static standing rest break x2 episodes. BED MOBILITY:  Supine to Sit: Stand By Assistance   Sit to Supine: Minimal Assistance To raise BLE onto bed. Scooting: Stand By Assistance EOB. Comment: Good carryover for log roll. TRANSFERS:  Sit to Stand:  Contact Guard Assistance. Stand to Sit: Contact Guard Assistance. Comment: Min cues for hand placement for Sit-to-stand.     FUNCTIONAL MOBILITY:  Assistive Device: Rolling Walker  Assist Level:  Contact Guard Assistance. Distance: Completed functional mobility short distance within unit hallway and within pt room for IADL task at very slow pace, no LOB noted. Pt requires x2 standing rest breaks and lenghty seated rest break after trial of mobility, mod fatigue and min dizziness noted. ADDITIONAL ACTIVITIES:  Pt participated in IADL task to ambulate within pt room with use of RW to retrieve linens from floor level utilizing reacher. Pt required CGA for balance and min cues for safe technique or walker safety while reaching into cupboards. Completed to increase kitchen safety and facilitate functional reaching required for simple meal prep tasks. ASSESSMENT:     Activity Tolerance:  Patient tolerance of  treatment: fair. Discharge Recommendations: Patient would benefit from continued therapy after discharge, Continue to assess pending progress  Equipment Recommendations: Other: may need a RW and BSC pending if she has them at home   Plan: Times per week: 6x  Current Treatment Recommendations: Strengthening, Balance Training, Endurance Training, Patient/Caregiver Education & Training, Self-Care / ADL, Safety Education & Training, Functional Mobility Training    Patient Education  Patient Education: IADL's, Precautions, Importance of Increasing Activity, Assistive Device Safety and Safety with transfers and mobility. Goals  Short term goals  Time Frame for Short term goals: by discharge   Short term goal 1: Pt to complete sit to stand from various surfaces including toilet with SBA and no vcs for safety orback precautions   Short term goal 2: Pt to be educated on back precautions and dmeo follow through or verbalize during ADL tasks with min vcs   Short term goal 3: pt to compelte LB ADL tasks with min A and use of LHAE as needed. Short term goal 4: Pt to navigate [facility] distances around obstacles and with distractions using AD with SBA and no LOB to be able to obtain needed items for ADL tasks. Following session, patient left in safe position with all fall risk precautions in place.

## 2020-02-18 NOTE — PROGRESS NOTES
Nutrition Assessment    Type and Reason for Visit: Reassess(po check)    Nutrition Recommendations:   Ensure high protein, low CHO bid. Activa tid & hot beverage tid. Monitor ability for bms, Consider probiotic. Recommend MVI  With iron as pt tolerates. Continue lab checks including glucose & Hgb   Encouraged best effort intake with lean protein & controlled CHO. Nutrition Assessment:   Pt. nutritionally compromised AEB decreased appetite and intake for a few weeks pta, but per pt is starting to improve   At risk for further nutrition compromise r/t pain at times, s/p OR 2/13 laminectomy and underlying medical condition (hx RA, hyperglycemia with steroids). Nutrition recommendations/interventions as per above. Malnutrition Assessment:  · Malnutrition Status: At risk for malnutrition  · Context: Acute illness or injury  · Findings of the 6 clinical characteristics of malnutrition (Minimum of 2 out of 6 clinical characteristics is required to make the diagnosis of moderate or severe Protein Calorie Malnutrition based on AND/ASPEN Guidelines):  1. Energy Intake-Less than or equal to 75% of estimated energy requirement, Greater than or equal to 7 days    2. Weight Loss-No significant weight loss,    3. Fat Loss-No significant subcutaneous fat loss,    4. Muscle Loss-No significant muscle mass loss,    5. Fluid Accumulation-No significant fluid accumulation,    6.  Strength-Not measured    Nutrition Risk Level:  Moderate    Nutrient Needs:  · Estimated Daily Total Kcal: 0198-6282 kcals (20-25 kcals/kgm wt of 86 kgm)  · Estimated Daily Protein (g): 79+ grams/day (1.3+ grams/kgm IBW)  · Estimated Daily Total Fluid (ml/day):  Per Dr    Nutrition Diagnosis:   · Problem: Inadequate oral intake  · Etiology: related to (pain at times)     Signs and symptoms:  as evidenced by Diet history of poor intake    Objective Information:  · Nutrition-Focused Physical Findings: Per pt had Mongolian toast for breakfast, appetite is getting better, does drink the high protein Ensure , last bm was on Sunday & trying to have a bm. Pt is agreeable to activa. A1c 6.2%. Per pt has been on steroids for years, is taking DB med, but hoping to get off of. Current  glucose 96, Hgb 8.7. meds include Glucophage, prednisone, Vitamin D & bm meds  · Wound Type: Surgical Wound(2/13 OR- laminectomy)  · Current Nutrition Therapies:  · Oral Diet Orders: 2gm Sodium, Carb Control 4 Carbs/Meal   · Oral Diet intake: (improving per pt)  · Oral Nutrition Supplement (ONS) Orders: Low Calorie High Protein Supplement, Other Oral Supplement (See Comment)(Ensure high protein, low CHO bid. activa tid)  · ONS intake: (per pt is taking Ensure High protein )  · Anthropometric Measures:  · Ht: 5' 7\" (170.2 cm)   · Current Body Wt: 183 lb 3.2 oz (83.1 kg)(2/15 no edema)  · Admission Body Wt: 189 lb 11.2 oz (86 kg)(2/12 - no edema, bedscale by this observer)  · Usual Body Wt: (per EMR: 7/10/19: 204# 4.8 oz, 11/12/19: 194# 11.2 oz)  · % Weight Change:  ,  -2.6% in 3 months  · Ideal Body Wt: 135 lb (61.2 kg),    · BMI Classification: BMI 25.0 - 29.9 Overweight(28.7)    Nutrition Interventions:   Continue current diet, Modify current ONS  Continued Inpatient Monitoring, Education Initiated, Coordination of Care(2/12 Encouraged good nutrition at best efforts. Discussed CHO controlled diet - pt. requests to have added to diet order.  )    Nutrition Evaluation:   · Evaluation: Progressing toward goals   · Goals: Pt. will consume 75% or more of meals during LOS.     · Monitoring: Meal Intake, Supplement Intake, Diet Tolerance, Skin Integrity, Weight, Pertinent Labs, Patient/Family Education, Monitor Bowel Function      Electronically signed by Valery Roach RD, LD on 2/18/20 at 10:27 AM    Contact Number: [phone]

## 2020-02-18 NOTE — PLAN OF CARE
Problem: Falls - Risk of:  Goal: Will remain free from falls  Description  Will remain free from falls  Outcome: Ongoing  Note:   No falls this shift. PT/OT on case. Problem: Falls - Risk of:  Goal: Absence of physical injury  Description  Absence of physical injury  Outcome: Ongoing  Note:   No injury this shift. Problem: Pain:  Goal: Pain level will decrease  Description  Pain level will decrease  Outcome: Ongoing  Note:   Patient with back pain this shift. Prn medications available. Problem: Risk for Impaired Skin Integrity  Goal: Tissue integrity - skin and mucous membranes  Description  Structural intactness and normal physiological function of skin and  mucous membranes. Outcome: Ongoing  Note:   No new skin breakdown noted. Encourage to turn. Monitor. Problem: Nutrition  Goal: Optimal nutrition therapy  2/18/2020 1843 by Ulises Walter RN  Outcome: Ongoing  Note:   Adequate appetite this shift. Problem: Musculor/Skeletal Functional Status  Goal: Highest potential functional level  Outcome: Ongoing  Note:   Patient working with PT/OT. Remains x 1 assist.      Problem: Discharge Planning:  Goal: Discharged to appropriate level of care  Description  Discharged to appropriate level of care  Outcome: Ongoing  Note:   Discharge to home when stable. Problem: Infection - Surgical Site:  Goal: Will show no infection signs and symptoms  Description  Will show no infection signs and symptoms  Outcome: Ongoing  Note:   None noted this shift. Afebrile. Problem: Venous Thromboembolism:  Goal: Will show no signs or symptoms of venous thromboembolism  Description  Will show no signs or symptoms of venous thromboembolism  Outcome: Ongoing  Note:   None noted this shift. Scds in use.       Problem: Pain:  Goal: Control of acute pain  Description  Control of acute pain  Outcome: Completed     Problem: Pain:  Goal: Control of chronic pain  Description  Control of chronic pain  Outcome: Completed     Care plan reviewed with patient and family. Patient and family verbalize understanding of the plan of care and contribute to goal setting.

## 2020-02-18 NOTE — PROGRESS NOTES
[address] [address]  Acute Inpatient Rehab Preadmission Assessment    Patient Name: Shaun Doyle        MRN: [id number]    : 1965  (62 y.o.)  Gender: female     Admitted from:[address]  Initial Assessment    Date of admission to the hospital: 2020  7:06 PM  Date patient eligible for admission:2020    Primary Diagnosis: Weakness [R53.1]  Cauda equina compression ([address] [G83.4]       Did patient have surgery? yes - 1.  New Posterior decompressive laminectomy from L3 to L4.  2.  Bilateral facetectomy at  L3-L4. 3.  Bilateral foraminotomy L3-L4   4.   Resection of herniated disc the level of L3-L4 from both sides. 5.  Extension of previous posterior instrumentation and fusion from to L3 using BioStratum system (Solera) as follow:     ·   Placement of 6.5 x 50  pedicle screw  at the level of L3, left. ·   Placement of 6.5  x 50 pedicle screws at the L3, right.   ·    Placement of  70  mm annette in the right. ·   Placement of 70  mm annette in the left. · Placement of 2 annette connector. ·   Placement of new 36 mm Crosslink.      7  . Applying biological graft Vitoss. 8  . Using the intraoperative neurophysiology. 9 . Using the surgical microscope.     Anesthesia: General endotracheal       Physicians: Marvin Colbert MD,  Dr. Klarissa Coombs, Dr. Maria Del Rosario Avilez for clinical complications/co-morbidities:   Past Medical History:   Diagnosis Date    Arthritis pain     Blood circulation, collateral     both arms--related to RA    Difficulty in swallowing     DVT (deep venous thrombosis) ([address]     Esophageal dysmotility 5/10/2013    Essential hypertension 2018    GERD (gastroesophageal reflux disease)     Hemorrhoids     Osteoarthritis     Pneumonia     Rheumatoid arthritis ([address]     Tobacco abuse 2013       Financial Information  Primary insurance: Commercial: Company: ., Contact Information: .     Secondary Insurance:  Commercial: Company: ., Contact Information: .    Has the patient had two or more falls in the past year or any fall with injury in the past year? yes    Did the patient have major surgery during the 100 days prior to admission? yes    Precautions:   falls, infections and skin  Restrictions/Precautions: Fall Risk, General Precautions    Isolation Precautions: None       Physiatrist: Dr. Walker Patton    Patients Occupation: Unemployed, not seeking work  Reviewed Lab and Diagnostic reports from Current Admission: Yes    Patients Prior Functional  Level: Prior Function  ADL Assistance: Independent  Homemaking Assistance: Independent  Ambulation Assistance: Independent  Transfer Assistance: Independent  Additional Comments: pt indep all ADL tasks. Current functional status for upper extremity ADLs:     Current functional status for lower extremity ADLs:    Contact Guard Assistance. initiated oral care in standing but d/t incresed pain and fatigue requesting to finish in sitting  Current functional status for bed, chair, wheelchair transfers:   Lower Extremity Dressing: Minimal Assistance. donning shorts with use of reacher. Pt educated on use of LHAE- reacher, sock aid, [address] and LH sponge- to complete LB ADL tasks while maintaining back precautions. Visual and verbal demo given with pt then returning demo the use of reacher to don sh  Current functional status for toilet transfers:   Sit to Stand:  [address] from EOB and min A from chair with no arms  Stand to Sit: Contact Guard Assistance. otno EOB and chair with no arms     Current functional status for locomotion:   Contact Guard Assistance, Minimal Assistance  Distance: 45 feet x1   Surface: Level Tile  Device:Rolling Walker  Gait Deviations: Forward Flexed Posture, Slow Lizz, Decreased Step Length Bilaterally, Decreased Weight Shift Bilaterally, Decreased Gait Speed and Min instability with directional changes. Pt noting Min dizziness while turning.    Current functional status for

## 2020-02-18 NOTE — PROGRESS NOTES
Hospitalist Progress Note    Patient:  Chyna Duong  YOB: 1965  MRN: [id number]   PCP: FABIAN Hammer CNP       Acct: [de-identified]  Unit[facility]Bed: [facility]    Date of Admission: 2/11/2020      ASSESSMENT     1. Severe spinal canal stenosis with compression of the cauda equina at the level of L3-L4 s/p decompressive laminectomy L3-L4 on 2/13:  Still has residual deficits including  paresthesias at the bilateral anterior thighs and mild LE weakness Neurosurgery, Dr. Sridevi Henry following. PT/OT. Considering discharge to TCU. Patient ambulatory with the use of a walker and this is improving gradually  2. Post-op acute blood loss anemia: Hgb 11.3--->7.9. Stable 8.8. Patient asymptomatic  3. Acute hypotension: Noted on morning of 2/18 SBP to 80s. Likley narcotic induced. Patient was symptomatic. Treated with Hydrocortisone and fluid bolus in setting of chronic steroid use. Losartan was restarted the day prior and may have been contributory. Held again on 2/18  4. Constipation: Intermittent. Dulocolax suppository prn. Continue naloxegol. Laxatives prn  5. Essential HTN: Losartan- hydrochlorothiazide initially held. Restarted on 2/17, but patient was hypotensive again on 2/18. Currently held  6. Hypokalemia: replace prn  7. Right facial swelling, improved: suspect due to position during surgery. No evidence to suggest angioedema. 8. Chronically immunosuppressed state on chronic steroids, active rheumatoid arthritis currently on DMARDs: Chronically on sulfasalazine and prednisone with methotrexate but not taking folic acid currently.  Also on Somponi as outpatient, started more recently. Folic acid started. Continue prednisone and sulfasalazine. Continue calcium/vitamin D supplementation  9. Bibasilar interstitial infiltrates: CT abdomen/pelvis documented this finding, patient started on ceftriaxone and azithromycin for assumed CAP.  Procal 0.04, DC'ed ABX per Dr. Gina Bowden and patient has remained Oral Daily    calcium-vitamin D  1 tablet Oral Daily    pantoprazole  40 mg Oral QAM AC    sulfaSALAzine  1,000 mg Oral BID    predniSONE  15 mg Oral Daily     PRN Meds: HYDROcodone 5 mg - acetaminophen, tiZANidine, bisacodyl, potassium chloride **OR** potassium alternative oral replacement **OR** potassium chloride, sodium chloride flush, ondansetron, cyclobenzaprine, ketorolac, magnesium hydroxide, acetaminophen    Ins and outs:      Intake/Output Summary (Last 24 hours) at 2/18/2020 1012  Last data filed at 2/18/2020 0452  Gross per 24 hour   Intake 550 ml   Output --   Net 550 ml       Physical Examination     BP (!) 90/50 Comment: manual  Pulse 62   Temp 97.8 °F (36.6 °C) (Oral)   Resp 18   Ht 5' 7\" (1.702 m)   Wt 183 lb 1.6 oz (83.1 kg)   SpO2 100%   BMI 28.68 kg/m²     General appearance: No apparent distress. Lying in bed. HEENT: Extraocular motion intact. Trachea midline. Right facial swelling improved  Neck: Supple  Respiratory:  CTA bilaterally without rales/wheezes/rhonchi. Cardiovascular: RRR. 3/6 MADELIN. No rubs or gallops. Abdomen: Soft, non-tender, non-distended with normal bowel sounds. Musculoskeletal: Patient is moving extremities x 4 spontaneously. Arthritic changes of the bilateral hands  Neurologic: Grossly non focal. CN: II-XII intact. No sensory deficits detected on palpation of anterior thighs   Psychiatric: Alert and oriented  Vascular: Dorsalis pedis palpable bilaterally. Radial pulses palpable bilaterally. No peripheral edema  Skin:  No visible rashes or lesions.     Labs     Recent Labs     02/16/20  0819 02/17/20  0759 02/18/20  0700   WBC 10.0 10.5 10.6   HGB 8.8* 8.8* 8.7*   HCT 29.0* 29.2* 29.5*   * 525* 537*     Recent Labs     02/16/20  0819 02/17/20  0759 02/18/20  0700    143 144   K 3.5 3.9 4.0    104 105   CO2 26 26 24   BUN 4* 6* 7   CREATININE 0.4 0.5 0.4   CALCIUM 8.7 9.0 9.4     No results for input(s): AST, ALT, BILIDIR, BILITOT, ALKPHOS in mass effect. There is no focal abnormality of brain parenchymal attenuation. Ventricles/basal cisterns: The ventricles and cisterns are of appropriate size and configuration for the patient's age. No evidence of obstructive hydrocephalus. Skull base/calvarium/osseous structures: Unremarkable Soft tissues: Unremarkable Intraorbital contents: Unremarkable Sinuses: Unremarkable; the imaged sinuses are clear without evidence of mucosal thickening or fluid levels. Mastoids: Unremarkable; the mastoid air cells are clear. No acute ischemic infarct, hemorrhage, or mass effect. **This report has been created using voice recognition software. It may contain minor errors which are inherent in voice recognition technology. ** Final report electronically signed by Dr. Juany Banerjee on 2/11/2020 8:50 PM    Mri Lumbar Spine Wo Contrast    Result Date: 2/12/2020  PROCEDURE: MRI LUMBAR SPINE WO CONTRAST CLINICAL INFORMATION: pain. Lower extremity weakness. Thighs are numb and painful. Difficulty walking for 2 to 3 weeks. COMPARISON: Lumbar spine CT 2/11/2020. Lumbar spine MRI 12/31/2014. TECHNIQUE: Sagittal and axial T1 and T2-weighted images were obtained through the lumbar spine. FINDINGS: The patient has had lumbar fusion and laminectomy at L4-S1. There is stable straightening of the normal lumbar lordosis. There is new loss of disc height at the L3-4 level. This is the level above the fusion. There is edema in the endplates. There does appear fusion across the discs at the L4-5 and L5-S1 levels. There are no compression fractures. No pars defects are noted. There is normal signal in the disks above the L3-4 level. The distal spinal cord, conus medullaris and cauda equina are normal. There are no gross abnormalities in the visualized aspects of the distal thoracic spine. On the axial images, at T12-L1, there are no degenerative changes. The disc is normal. There is no spinal canal or foraminal stenosis.  At L1-L2, there are mild facet degenerative changes. The disc is normal. There is no spinal canal or foraminal stenosis. At L2-L3, there are mild facet degenerative changes. There is some thickening of ligamentum flavum. There is mild stenosis of the thecal sac. There is no foraminal stenosis. At L3-L4, there is loss of disc height. There is a large central disc protrusion which migrates superiorly in the left lateral recess. There are moderate severity facet degenerative changes. There is thickening of ligamentum flavum. There is severe spinal canal stenosis. There is compression of the cauda equina. This is new. There is moderate severity bilateral foraminal stenosis. At L4-L5, there has been posterior decompression. There is no stenosis of the thecal sac. There is moderate severity right foraminal stenosis. At L5-S1, there has been posterior decompression. There is no stenosis of the thecal sac. There is moderate severity bilateral foraminal stenosis. There are no suspicious findings in the visualized aspects of the retroperitoneum and paraspinal soft tissues. 1. Severe spinal canal stenosis with compression of the cauda equina at the L3-4 level due to loss of disc height and a large central/left central disc protrusion which migrates superiorly. There are also facet degenerative changes at this level. There is moderate severity bilateral foraminal stenosis. 2. Moderate severity bilateral foraminal stenosis at the L5-S1 level and moderate severity right foraminal stenosis at the L4-5 level. These findings were telephoned to [facility] the patient's nurse on 8A at [address] AM on 2/12/2020 . **This report has been created using voice recognition software. It may contain minor errors which are inherent in voice recognition technology. ** Final report electronically signed by Dr. Bandar Cat on 2/12/2020 8:12 AM    Ct Abdomen Pelvis W Iv Contrast Additional Contrast? None    Result Date: 2/11/2020  PROCEDURE: CT ABDOMEN PELVIS W IV CONTRAST CLINICAL INFORMATION: low back pain . COMPARISON: None. TECHNIQUE: 5 mm axial CT images were obtained through the abdomen and pelvis after the administration of intravenous contrast. Coronal and sagittal reconstructions were obtained. All CT scans at this facility use dose modulation, iterative reconstruction, and/or weight-based dosing when appropriate to reduce radiation dose to as low as reasonably achievable. FINDINGS: Lower chest: There are bibasilar interstitial infiltrates. Differential diagnosis includes pulmonary edema, atypical pneumonia, and/or fibrosis. There are 1 cm and smaller bilateral lower lobe bullae. Liver: There are a few 5 mm and smaller hypodense lesions which are difficult to characterize, possibly representing cysts. . There is no solid appearing liver mass or intrahepatic biliary dilatation. Gallbladder/Biliary tree: Unremarkable. No calcified gallstones. No extrahepatic biliary dilatation. Spleen: Unremarkable. No splenomegaly. Pancreas: Unremarkable. No mass or pancreatic ductal dilatation. No findings to suggest acute pancreatitis. Adrenal glands: Unremarkable. No mass. Kidneys and ureters: There is an 8 mm probable cyst of the upper pole of the left kidney. The kidneys are otherwise unremarkable. . No hydroureteronephrosis. No renal or ureteral calculi. No findings to suggest acute pyelonephritis. Stomach, small bowel, and colon: Stomach and duodenum are unremarkable. There is colonic diverticulosis without diverticulitis. Small bowel and colon are otherwise unremarkable. There is no evidence of bowel wall thickening. No evidence of bowel obstruction. Appendix: The appendix is not visualized however there are no findings to suggest acute appendicitis. Omentum and mesentery: Unremarkable Aorta, vascular: No aortic aneurysm or dissection. There are aortoiliofemoral atherosclerotic calcifications. Reproductive: Unremarkable Bladder: Unremarkable. No wall thickening or obvious mass.

## 2020-02-18 NOTE — PLAN OF CARE
Problem: Nutrition  Goal: Optimal nutrition therapy  Outcome: Ongoing   Nutrition Problem: Inadequate oral intake  Intervention: Food and/or Nutrient Delivery: Continue current diet, Modify current ONS  Nutritional Goals: Pt. will consume 75% or more of meals during LOS.

## 2020-02-19 LAB
ANION GAP SERPL CALCULATED.3IONS-SCNC: 15 MEQ/L (ref 8–16)
BUN BLDV-MCNC: 10 MG/DL (ref 7–22)
CALCIUM SERPL-MCNC: 9.3 MG/DL (ref 8.5–10.5)
CHLORIDE BLD-SCNC: 105 MEQ/L (ref 98–111)
CO2: 23 MEQ/L (ref 23–33)
CREAT SERPL-MCNC: 0.4 MG/DL (ref 0.4–1.2)
ERYTHROCYTE [DISTWIDTH] IN BLOOD BY AUTOMATED COUNT: 16.3 % (ref 11.5–14.5)
ERYTHROCYTE [DISTWIDTH] IN BLOOD BY AUTOMATED COUNT: 55.4 FL (ref 35–45)
GFR SERPL CREATININE-BSD FRML MDRD: > 90 ML/MIN/1.73M2
GLUCOSE BLD-MCNC: 97 MG/DL (ref 70–108)
HCT VFR BLD CALC: 30.5 % (ref 37–47)
HEMOGLOBIN: 9.1 GM/DL (ref 12–16)
MCH RBC QN AUTO: 29.3 PG (ref 26–33)
MCHC RBC AUTO-ENTMCNC: 29.8 GM/DL (ref 32.2–35.5)
MCV RBC AUTO: 98.1 FL (ref 81–99)
PLATELET # BLD: 597 THOU/MM3 (ref 130–400)
PMV BLD AUTO: 9.7 FL (ref 9.4–12.4)
POTASSIUM SERPL-SCNC: 3.7 MEQ/L (ref 3.5–5.2)
RBC # BLD: 3.11 MILL/MM3 (ref 4.2–5.4)
SODIUM BLD-SCNC: 143 MEQ/L (ref 135–145)
WBC # BLD: 12.4 THOU/MM3 (ref 4.8–10.8)

## 2020-02-19 PROCEDURE — 85027 COMPLETE CBC AUTOMATED: CPT

## 2020-02-19 PROCEDURE — 94760 N-INVAS EAR/PLS OXIMETRY 1: CPT

## 2020-02-19 PROCEDURE — 97110 THERAPEUTIC EXERCISES: CPT

## 2020-02-19 PROCEDURE — 6370000000 HC RX 637 (ALT 250 FOR IP): Performed by: NEUROLOGICAL SURGERY

## 2020-02-19 PROCEDURE — 1200000000 HC SEMI PRIVATE

## 2020-02-19 PROCEDURE — 97116 GAIT TRAINING THERAPY: CPT

## 2020-02-19 PROCEDURE — 6370000000 HC RX 637 (ALT 250 FOR IP): Performed by: INTERNAL MEDICINE

## 2020-02-19 PROCEDURE — 36415 COLL VENOUS BLD VENIPUNCTURE: CPT

## 2020-02-19 PROCEDURE — 99232 SBSQ HOSP IP/OBS MODERATE 35: CPT | Performed by: PHYSICIAN ASSISTANT

## 2020-02-19 PROCEDURE — 80048 BASIC METABOLIC PNL TOTAL CA: CPT

## 2020-02-19 PROCEDURE — 2580000003 HC RX 258: Performed by: NEUROLOGICAL SURGERY

## 2020-02-19 PROCEDURE — 97535 SELF CARE MNGMENT TRAINING: CPT

## 2020-02-19 RX ADMIN — HYDROCODONE BITARTRATE AND ACETAMINOPHEN 1 TABLET: 5; 325 TABLET ORAL at 07:39

## 2020-02-19 RX ADMIN — Medication 10 ML: at 07:45

## 2020-02-19 RX ADMIN — NALOXEGOL OXALATE 12.5 MG: 12.5 TABLET, FILM COATED ORAL at 07:40

## 2020-02-19 RX ADMIN — CALCIUM CARBONATE-VITAMIN D TAB 500 MG-200 UNIT 1 TABLET: 500-200 TAB at 07:40

## 2020-02-19 RX ADMIN — PREDNISONE 15 MG: 10 TABLET ORAL at 07:40

## 2020-02-19 RX ADMIN — TIZANIDINE 4 MG: 4 TABLET ORAL at 17:34

## 2020-02-19 RX ADMIN — TIZANIDINE 4 MG: 4 TABLET ORAL at 00:12

## 2020-02-19 RX ADMIN — SULFASALAZINE 1000 MG: 500 TABLET ORAL at 21:56

## 2020-02-19 RX ADMIN — TIZANIDINE 4 MG: 4 TABLET ORAL at 17:35

## 2020-02-19 RX ADMIN — SULFASALAZINE 1000 MG: 500 TABLET ORAL at 07:39

## 2020-02-19 RX ADMIN — PANTOPRAZOLE SODIUM 40 MG: 40 TABLET, DELAYED RELEASE ORAL at 06:35

## 2020-02-19 RX ADMIN — HYDROCODONE BITARTRATE AND ACETAMINOPHEN 1 TABLET: 5; 325 TABLET ORAL at 17:35

## 2020-02-19 RX ADMIN — SENNOSIDES 8.6 MG: 8.6 TABLET, FILM COATED ORAL at 21:56

## 2020-02-19 RX ADMIN — Medication 10 ML: at 21:56

## 2020-02-19 RX ADMIN — DOCUSATE SODIUM 200 MG: 100 CAPSULE, LIQUID FILLED ORAL at 07:39

## 2020-02-19 RX ADMIN — ATORVASTATIN CALCIUM 20 MG: 20 TABLET, FILM COATED ORAL at 07:40

## 2020-02-19 ASSESSMENT — PAIN SCALES - GENERAL
PAINLEVEL_OUTOF10: 4
PAINLEVEL_OUTOF10: 3
PAINLEVEL_OUTOF10: 3
PAINLEVEL_OUTOF10: 4
PAINLEVEL_OUTOF10: 3
PAINLEVEL_OUTOF10: 7

## 2020-02-19 ASSESSMENT — PAIN DESCRIPTION - PROGRESSION

## 2020-02-19 ASSESSMENT — PAIN - FUNCTIONAL ASSESSMENT
PAIN_FUNCTIONAL_ASSESSMENT: ACTIVITIES ARE NOT PREVENTED

## 2020-02-19 ASSESSMENT — PAIN DESCRIPTION - LOCATION
LOCATION: BACK

## 2020-02-19 ASSESSMENT — PAIN DESCRIPTION - ONSET
ONSET: ON-GOING

## 2020-02-19 ASSESSMENT — PAIN DESCRIPTION - FREQUENCY
FREQUENCY: CONTINUOUS

## 2020-02-19 ASSESSMENT — PAIN DESCRIPTION - ORIENTATION
ORIENTATION: LOWER;MID

## 2020-02-19 ASSESSMENT — PAIN DESCRIPTION - DESCRIPTORS
DESCRIPTORS: ACHING

## 2020-02-19 ASSESSMENT — PAIN DESCRIPTION - PAIN TYPE
TYPE: ACUTE PAIN;SURGICAL PAIN

## 2020-02-19 NOTE — PROGRESS NOTES
Insurance denied Rehab admission. Peer to peer call must be made by acute care attending if desired, contact information provided to Renato Leavitt.

## 2020-02-19 NOTE — PLAN OF CARE
Problem: Falls - Risk of:  Goal: Will remain free from falls  Description  Will remain free from falls  2/19/2020 0315 by Eh Wilkerson RN  Outcome: Ongoing  Note:   Patient remained free of falls. Call light within reach and used appropriately. Bed alarmed and in lowest position, side rails up x2, personal items within reach and non skid socks worn when ambulating. Ambulates with 1A and walker tolerating well. Problem: Pain:  Goal: Pain level will decrease  Description  Pain level will decrease  2/19/2020 0315 by Eh Wilkerson RN  Outcome: Ongoing  Note:   Patient rates pain 2/10 with a pain goal of no pain, 0/10. Pain controlled with medication and repositioning. Patient satisfied. Problem: Risk for Impaired Skin Integrity  Goal: Tissue integrity - skin and mucous membranes  Description  Structural intactness and normal physiological function of skin and  mucous membranes. 2/19/2020 0315 by Eh Wilkerson RN  Outcome: Ongoing  Note:   No signs of new skin breakdown with each assessment. Skin remains warm, dry, intact. Mucous membranes pink & moist. Patient understands the importance of frequent repositioning in order to prevent any skin breakdown. Dressing in place to back surgical site clean, dry, and intact. Problem: Nutrition  Goal: Optimal nutrition therapy  2/19/2020 0315 by Eh Wilkerson RN  Outcome: Ongoing  Note:   Patient on low sodium 2 GM; carb control diet tolerating well. Denies any n/v.     Problem: Musculor/Skeletal Functional Status  Goal: Highest potential functional level  2/19/2020 0315 by Eh Wilkerson RN  Outcome: Ongoing  Note:   Ambulates with 1A using walker and gait belt tolerating fairly well. Some weakness in legs working with PT/OT     Problem: Discharge Planning:  Goal: Discharged to appropriate level of care  Description  Discharged to appropriate level of care  2/19/2020 0315 by Eh Wilkerson RN  Outcome: Ongoing  Note:   Discharge planning in progress.

## 2020-02-19 NOTE — PROGRESS NOTES
[address] [address]  INPATIENT PHYSICAL THERAPY  DAILY NOTE  [facility] 7K - 7K-28/028-A  Time In: 2177  Time Out: 1885  Timed Code Treatment Minutes: 24 Minutes  Minutes: 24          Date: 2020  Patient Name: Antonio Ch,  Gender:  female        MRN: [id number]  : 1965  (62 y.o.)     Referring Practitioner: Dr. Byers Captain     Additional Pertinent Hx: admit with above diagnosis, s/p LUMBAR DECOMPRESSION, DISC REMOVAL BETWEEN L3-4, And extension of previous L4-S1 instrumentation and fusion L3 on 20     Prior Level of Function:  Lives With: Spouse  Type of Home: House  Home Layout: One level  Home Access: Stairs to enter with rails  Entrance Stairs - Number of Steps: 1 platform step   Home Equipment: Cane, Standard walker   Bathroom Shower/Tub: Tub/Shower unit(possibly has a shower chair )  Bathroom Toilet: Standard  Bathroom Equipment: Toilet raiser(she thinks she has )  Bathroom Accessibility: Accessible    ADL Assistance: Independent  Homemaking Assistance: Independent  Ambulation Assistance: Independent  Transfer Assistance: Independent  IADL Comments: pt compelting all homemaking tasks   Additional Comments: pt indep all ADL tasks. Restrictions/Precautions:  Restrictions/Precautions: Fall Risk, General Precautions  Position Activity Restriction  Spinal Precautions: No Bending, No Lifting, No Twisting    SUBJECTIVE: Pt is pleasant and cooperative. Motivated. PAIN: 6/10- back     OBJECTIVE:  Bed Mobility:  Supine to Sit: Contact Guard Assistance    Transfers:  Sit to Stand: Contact Guard Assistance, X 1  Stand to [facility] 68, X 1    Ambulation:  Contact Guard Assistance, Minimal Assistance, X 1  Distance: ~75 feet x 1   Surface: Level Tile  Device:Rolling Walker  Gait Deviations:   Forward Flexed Posture, Slow Lizz, Decreased Step Length Bilaterally, Decreased Heel Strike Bilaterally, Narrow Base of Support and Mild Path Deviations    Balance:  Dynamic Standing Balance: Contact Guard Assistance, Minimal Assistance, X 1    Exercise:  Patient was guided in 10 reps of exercise to both lower extremities. Ankle pumps, Long arc quads, Hip abduction/adduction and Seated hip flexion. Exercises were completed for increased independence with functional mobility. Functional Outcome Measures: Completed  AM-PAC Inpatient Mobility without Stair Climbing Raw Score : 15  AM-PAC Inpatient without Stair Climbing T-Scale Score : 43.03    ASSESSMENT:  Assessment: Patient progressing toward established goals. Activity Tolerance:  Patient tolerance of  treatment: good. Equipment Recommendations:Equipment Needed: Yes  Mobility Devices: Martene Bunting: Rolling  Discharge Recommendations:  Continue to assess pending progress- IP Rehab     Plan: Times per week: 6-7X O  Times per day: Daily  Specific instructions for Next Treatment: therex and mobility with back precautions    Patient Education  Patient Education: Gait    Goals:  Patient goals : go home  Short term goals  Time Frame for Short term goals: by discharge  Short term goal 1: bed mobility with S  Short term goal 2: transfer with S  Short term goal 3: amb >100'x1 with walker and S to walk safely in home  Short term goal 4: negotiate platform step with walker and SBA to enter home safely  Long term goals  Time Frame for Long term goals : no LTGs set secondary to short ELOS    Following session, patient left in safe position with all fall risk precautions in place.

## 2020-02-19 NOTE — PROGRESS NOTES
Hospitalist Progress Note      Patient:  Chyna Duong    Unit/Bed:7K-28/028-A  YOB: 1965  MRN: [id number]   Acct: [de-identified]   PCP: FABIAN Hammer CNP  Date of Admission: 2/11/2020    Assessment/Plan:    1. Cauda Equina Syndrome at L3-L4 s/p decompressive laminectomy L3-L4: Surgery on 2/13 (POD6). PT/OT. Pt still having residual deficits - bilateral thigh region and LE weakness. Pt is using walker to ambulate. 2. Acute Blood Loss Anemia, post-operatively: Hgb 11.3 to 7.9 to 8.8 to 8.7 to 9.1. 3. Constipation: Possible that this is residual from the Cauda Equina Syndrome and this is neurogenic. Will continue to monitor. 4. Essential HTN: BP has been liable. Continue to monitor. BP medications have hold parameters. 5. Hypokalemia: Resolved. 6. RA, chronically on steroids and DMARDs: Continue home medications. Chief Complaint: Back Pain     Initial H and P:-    62 y.o. female who presented to [address] [place] [address] with complaints of lower extremity weakness. Thighs are reported to be numb and painful. Symptoms located bilateral anterior thighs. Patient endorses lumbar pain and is having difficulty ambulatin x 2-3 weeks. She denies injury. She reports history of lumbar fusion in the past. She notes that she is not having any bowel or bladder incontinence. She denies chest pain or shortness of breath. Denies nausea, vomiting, diarrhea or constipation. Denies fevers. Endorses chills. Patient has history of RA and is on steroids at home. Subjective (past 24 hours):   Pt states that she is working hard with PT/OT. She states that her pain is manageable. Insurance denied IP Rehab. Past medical history, family history, social history and allergies reviewed again and is unchanged since admission. ROS Pt admits to fatigue. Pt denies CP, SOB, HA, abd pain.    Medications:  Reviewed    Infusion Medications   Scheduled Medications    bisacodyl  10 mg Rectal Once    potassium replacement protocol   Other RX Placeholder    magnesium replacement protocol   Other RX Placeholder    naloxegol  12.5 mg Oral QAM    senna  1 tablet Oral BID    docusate sodium  200 mg Oral Daily    polyethylene glycol  17 g Oral Daily    sodium chloride flush  10 mL Intravenous 2 times per day    [Held by provider] losartan  50 mg Oral Daily    And    [Held by provider] hydrochlorothiazide  12.5 mg Oral Daily    lidocaine  1 patch Transdermal Daily    atorvastatin  20 mg Oral Daily    calcium-vitamin D  1 tablet Oral Daily    pantoprazole  40 mg Oral QAM AC    sulfaSALAzine  1,000 mg Oral BID    predniSONE  15 mg Oral Daily     PRN Meds: HYDROcodone 5 mg - acetaminophen, tiZANidine, bisacodyl, potassium chloride **OR** potassium alternative oral replacement **OR** potassium chloride, sodium chloride flush, ondansetron, cyclobenzaprine, magnesium hydroxide, acetaminophen      Intake/Output Summary (Last 24 hours) at 2/19/2020 1324  Last data filed at 2/19/2020 1321  Gross per 24 hour   Intake 1394.68 ml   Output --   Net 1394.68 ml       Diet:  DIET LOW SODIUM 2 GM; Carb Control: 4 carb choices (60 gms)/meal  Dietary Nutrition Supplements: Low Calorie High Protein Supplement  Dietary Nutrition Supplements: Other Oral Supplement (see comment)    Exam:  BP (!) 105/52   Pulse 67   Temp 97.2 °F (36.2 °C) (Oral)   Resp 18   Ht 5' 7\" (1.702 m)   Wt 183 lb 1.6 oz (83.1 kg)   SpO2 100%   BMI 28.68 kg/m²   General appearance: No apparent distress, appears stated age and cooperative. HEENT: Pupils equal, round, and reactive to light. Conjunctivae/corneas clear. Neck: Supple, with full range of motion. No jugular venous distention. Trachea midline. Respiratory:  Normal respiratory effort. Clear to auscultation, bilaterally without Rales/Wheezes/Rhonchi.   Cardiovascular: Regular rate and rhythm with normal S1/S2 without murmurs, rubs or gallops. Abdomen: Soft, non-tender, non-distended with normal bowel sounds. Musculoskeletal: passive and active ROM x 4 extremities. Skin: Skin color, texture, turgor normal.  No rashes or lesions. Dressing c/d/i   Neurologic:  Neurovascularly intact without any focal sensory/motor deficits. Cranial nerves: II-XII intact, grossly non-focal. Numbness of anterior thighs   Psychiatric: Alert and oriented, thought content appropriate, normal insight  Capillary Refill: Brisk,< 3 seconds   Peripheral Pulses: +2 palpable, equal bilaterally     Labs:   Recent Labs     02/17/20  0759 02/18/20  0700 02/19/20  0708   WBC 10.5 10.6 12.4*   HGB 8.8* 8.7* 9.1*   HCT 29.2* 29.5* 30.5*   * 537* 597*     Recent Labs     02/17/20  0759 02/18/20  0700 02/19/20  0708    144 143   K 3.9 4.0 3.7    105 105   CO2 26 24 23   BUN 6* 7 10   CREATININE 0.5 0.4 0.4   CALCIUM 9.0 9.4 9.3     Microbiology:    Blood culture #1:   Lab Results   Component Value Date    BC No growth-preliminary No growth  02/11/2020     Urinalysis:      Lab Results   Component Value Date    NITRU NEGATIVE 02/11/2020    WBCUA 0-2 02/11/2020    BACTERIA NONE SEEN 02/11/2020    RBCUA 3-5 02/11/2020    BLOODU NEGATIVE 02/11/2020    SPECGRAV 1.029 05/24/2013    GLUCOSEU NEGATIVE 02/11/2020       Radiology:  XR LUMBAR SPINE (2-3 VIEWS)   Final Result   1. Posterior lumbar fusion revision is demonstrated. Please refer to operative report for further details. **This report has been created using voice recognition software. It may contain minor errors which are inherent in voice recognition technology. **      Final report electronically signed by Dr. Janae Fall on 2/13/2020 2:33 PM      FLUORO FOR SURGICAL PROCEDURES   Final Result      MRI LUMBAR SPINE WO CONTRAST   Final Result       1.  Severe spinal canal stenosis with compression of the cauda equina at the L3-4 level due to loss of disc height and a large central/left central disc laminectomies. No fracture or subluxation. Moderate L3-4 degenerative disc disease. L3-4 possible annular bulge and moderate spinal stenosis with probable impingement upon the right L3 nerve root. See the accompanying abdomen pelvis CT report for discussion of nonspine findings. **This report has been created using voice recognition software. It may contain minor errors which are inherent in voice recognition technology. **         Final report electronically signed by Dr. Patricia Mccarthy on 2/11/2020 8:56 PM      CT HEAD WO CONTRAST   Final Result      No acute ischemic infarct, hemorrhage, or mass effect. **This report has been created using voice recognition software. It may contain minor errors which are inherent in voice recognition technology. **      Final report electronically signed by Dr. Patricia Mccarthy on 2/11/2020 8:50 PM      XR CHEST PORTABLE   Final Result      No acute cardiopulmonary disease. **This report has been created using voice recognition software. It may contain minor errors which are inherent in voice recognition technology. **      Final report electronically signed by Dr. Patricia Mccarthy on 2/11/2020 7:52 PM        Electronically signed by JEREMIAH Chahal on 2/19/2020 at 1:24 PM

## 2020-02-19 NOTE — PROGRESS NOTES
Assistance. Stand to Sit: Stand By Assistance. FUNCTIONAL MOBILITY:  Assistive Device: Walker  Assist Level:  Stand By Assistance. Distance: To and from bathroom     Additional Activities: Pt. Reports that if she has to go home she would need a walker and a tub bench. Pt.denied needed of LHAE. ASSESSMENT:     Activity Tolerance:  Patient tolerance of  treatment: good. Discharge Recommendations: Patient would benefit from continued therapy after discharge, Continue to assess pending progress  Equipment Recommendations: Other: may need a RW and BSC pending if she has them at home   Plan: Times per week: 6x  Current Treatment Recommendations: Strengthening, Balance Training, Endurance Training, Patient/Caregiver Education & Training, Self-Care / ADL, Safety Education & Training, Functional Mobility Training    Patient Education  Patient Education: Energy Conservation Taking time to complete tasks and to do so in sitting as able    Goals  Short term goals  Time Frame for Short term goals: by discharge   Short term goal 1: Pt to complete sit to stand from various surfaces including toilet with SBA and no vcs for safety orback precautions   Short term goal 2: Pt to be educated on back precautions and dmeo follow through or verbalize during ADL tasks with min vcs   Short term goal 3: pt to compelte LB ADL tasks with min A and use of LHAE as needed. Short term goal 4: Pt to navigate [place] distances around obstacles and with distractions using AD with SBA and no LOB to be able to obtain needed items for ADL tasks. Following session, patient left in safe position with all fall risk precautions in place.

## 2020-02-20 PROCEDURE — 97110 THERAPEUTIC EXERCISES: CPT

## 2020-02-20 PROCEDURE — 6370000000 HC RX 637 (ALT 250 FOR IP): Performed by: INTERNAL MEDICINE

## 2020-02-20 PROCEDURE — 99232 SBSQ HOSP IP/OBS MODERATE 35: CPT | Performed by: PHYSICIAN ASSISTANT

## 2020-02-20 PROCEDURE — 97116 GAIT TRAINING THERAPY: CPT

## 2020-02-20 PROCEDURE — 1200000000 HC SEMI PRIVATE

## 2020-02-20 PROCEDURE — 97535 SELF CARE MNGMENT TRAINING: CPT

## 2020-02-20 PROCEDURE — 6370000000 HC RX 637 (ALT 250 FOR IP): Performed by: NEUROLOGICAL SURGERY

## 2020-02-20 PROCEDURE — 2580000003 HC RX 258: Performed by: NEUROLOGICAL SURGERY

## 2020-02-20 RX ADMIN — HYDROCODONE BITARTRATE AND ACETAMINOPHEN 1 TABLET: 5; 325 TABLET ORAL at 16:29

## 2020-02-20 RX ADMIN — TIZANIDINE 4 MG: 4 TABLET ORAL at 16:29

## 2020-02-20 RX ADMIN — HYDROCODONE BITARTRATE AND ACETAMINOPHEN 1 TABLET: 5; 325 TABLET ORAL at 04:39

## 2020-02-20 RX ADMIN — SULFASALAZINE 1000 MG: 500 TABLET ORAL at 20:39

## 2020-02-20 RX ADMIN — SULFASALAZINE 1000 MG: 500 TABLET ORAL at 10:07

## 2020-02-20 RX ADMIN — PREDNISONE 15 MG: 10 TABLET ORAL at 10:07

## 2020-02-20 RX ADMIN — CALCIUM CARBONATE-VITAMIN D TAB 500 MG-200 UNIT 1 TABLET: 500-200 TAB at 10:07

## 2020-02-20 RX ADMIN — Medication 10 ML: at 20:39

## 2020-02-20 RX ADMIN — DOCUSATE SODIUM 200 MG: 100 CAPSULE, LIQUID FILLED ORAL at 13:19

## 2020-02-20 RX ADMIN — SENNOSIDES 8.6 MG: 8.6 TABLET, FILM COATED ORAL at 10:07

## 2020-02-20 RX ADMIN — NALOXEGOL OXALATE 12.5 MG: 12.5 TABLET, FILM COATED ORAL at 10:07

## 2020-02-20 RX ADMIN — POLYETHYLENE GLYCOL 3350 17 G: 17 POWDER, FOR SOLUTION ORAL at 13:19

## 2020-02-20 RX ADMIN — PANTOPRAZOLE SODIUM 40 MG: 40 TABLET, DELAYED RELEASE ORAL at 06:04

## 2020-02-20 RX ADMIN — TIZANIDINE 4 MG: 4 TABLET ORAL at 10:07

## 2020-02-20 RX ADMIN — ATORVASTATIN CALCIUM 20 MG: 20 TABLET, FILM COATED ORAL at 10:07

## 2020-02-20 RX ADMIN — SENNOSIDES 8.6 MG: 8.6 TABLET, FILM COATED ORAL at 20:39

## 2020-02-20 RX ADMIN — TIZANIDINE 4 MG: 4 TABLET ORAL at 04:39

## 2020-02-20 ASSESSMENT — PAIN - FUNCTIONAL ASSESSMENT
PAIN_FUNCTIONAL_ASSESSMENT: ACTIVITIES ARE NOT PREVENTED
PAIN_FUNCTIONAL_ASSESSMENT: ACTIVITIES ARE NOT PREVENTED

## 2020-02-20 ASSESSMENT — PAIN DESCRIPTION - DESCRIPTORS
DESCRIPTORS: ACHING;CONSTANT
DESCRIPTORS: ACHING;DISCOMFORT

## 2020-02-20 ASSESSMENT — PAIN DESCRIPTION - ORIENTATION
ORIENTATION: LOWER
ORIENTATION: LOWER;LEFT

## 2020-02-20 ASSESSMENT — PAIN DESCRIPTION - FREQUENCY
FREQUENCY: CONTINUOUS
FREQUENCY: CONTINUOUS

## 2020-02-20 ASSESSMENT — PAIN DESCRIPTION - ONSET
ONSET: ON-GOING
ONSET: ON-GOING

## 2020-02-20 ASSESSMENT — PAIN SCALES - GENERAL
PAINLEVEL_OUTOF10: 2
PAINLEVEL_OUTOF10: 4
PAINLEVEL_OUTOF10: 4

## 2020-02-20 ASSESSMENT — PAIN DESCRIPTION - PROGRESSION
CLINICAL_PROGRESSION: NOT CHANGED

## 2020-02-20 ASSESSMENT — PAIN DESCRIPTION - LOCATION
LOCATION: BACK
LOCATION: BACK

## 2020-02-20 ASSESSMENT — PAIN DESCRIPTION - PAIN TYPE
TYPE: SURGICAL PAIN
TYPE: SURGICAL PAIN

## 2020-02-20 NOTE — PROGRESS NOTES
[facility] Inpatient Rehab Preadmission Assessment    Patient Name: Benitez Melgar        MRN: [id number]    : 1965  (62 y.o.)  Gender: female     Admitted from:[address]  updated Assessment    Date of admission to the hospital: 2020  7:06 PM  Date patient eligible for admission:2020    Primary Diagnosis: Weakness [R53.1]  Cauda equina compression ([address] [G83.4]       Did patient have surgery? yes - 1.  New Posterior decompressive laminectomy from L3 to L4.  2.  Bilateral facetectomy at  L3-L4. 3.  Bilateral foraminotomy L3-L4   4.   Resection of herniated disc the level of L3-L4 from both sides. 5.  Extension of previous posterior instrumentation and fusion from to L3 using [employer] system (Solera) as follow:     ·   Placement of 6.5 x 50  pedicle screw  at the level of L3, left. ·   Placement of 6.5  x 50 pedicle screws at the L3, right.   ·    Placement of  70  mm annette in the right. ·   Placement of 70  mm annette in the left. · Placement of 2 annette connector. ·   Placement of new 36 mm Crosslink.      7  . Applying biological graft Vitoss. 8  . Using the intraoperative neurophysiology. 9 . Using the surgical microscope.     Anesthesia: General endotracheal       Physicians: JEREMIAH Yen,  Dr. Sera Jaimes, Dr. Brittany Knott for clinical complications/co-morbidities:   Past Medical History:   Diagnosis Date    Arthritis pain     Blood circulation, collateral     both arms--related to RA    Difficulty in swallowing     DVT (deep venous thrombosis) ([address]     Esophageal dysmotility 5/10/2013    Essential hypertension 2018    GERD (gastroesophageal reflux disease)     Hemorrhoids     Osteoarthritis     Pneumonia     Rheumatoid arthritis ([address]     Tobacco abuse 2013       Financial Information  Primary insurance: Commercial: Company: ., Contact Information: .     Secondary Insurance:  Commercial: Company: ., Contact Information: .    Has the patient had two or more falls in the past year or any fall with injury in the past year? yes    Did the patient have major surgery during the 100 days prior to admission? yes    Precautions:   falls, infections and skin  Restrictions/Precautions: Fall Risk, General Precautions    Isolation Precautions: None       Physiatrist: Dr. Valerie Lemos    Patients Occupation: Unemployed, not seeking work  Reviewed Lab and Diagnostic reports from Current Admission: Yes    Patients Prior Functional  Level: Prior Function  ADL Assistance: Independent  Homemaking Assistance: Independent  Ambulation Assistance: Independent  Transfer Assistance: Independent  Additional Comments: pt indep all ADL tasks. Current functional status for upper extremity ADLs:     Current functional status for lower extremity ADLs:    Contact Guard Assistance. initiated oral care in standing but d/t incresed pain and fatigue requesting to finish in sitting  Current functional status for bed, chair, wheelchair transfers:   Lower Extremity Dressing: Minimal Assistance. donning shorts with use of reacher. Pt educated on use of LHAE- reacher, sock aid, [address] and LH sponge- to complete LB ADL tasks while maintaining back precautions. Visual and verbal demo given with pt then returning demo the use of reacher to don sh  Current functional status for toilet transfers:   Sit to Stand:  [employer]. from EOB and min A from chair with no arms  Stand to Sit: Contact Guard Assistance. otno EOB and chair with no arms     Current functional status for locomotion:   Contact Guard Assistance, Minimal Assistance  Distance: 45 feet x1   Surface: Level Tile  Device:Rolling Walker  Gait Deviations: Forward Flexed Posture, Slow Lizz, Decreased Step Length Bilaterally, Decreased Weight Shift Bilaterally, Decreased Gait Speed and Min instability with directional changes. Pt noting Min dizziness while turning.    Current functional status for bladder management: Modified independence    Current functional status for bowel management:Modified independence    Current functional status for comprehension: Modified independence    Current functional status for expression: Modified independence    Current functional status for social interaction: Modified independence    Current functional status for problem solving: Modified independence    Current functional status for memory: Modified independence    Expected level of Improvement in Self-Care:  Complete independence    Expected level of Improvement in Sphincter Control:  Complete independence    Expected level of Improvement in Transfers: Complete independence    Expected level of Improvement in Locomotion:  Complete independence    Expected level of Improvement in Communication and Social Cognition: Complete independence    Expected length of time to achieve that level of improvement: 2 weeks    Current rehab issues: ADL dysfunction,bladder management,bowel management,carry over of therapy techniques, discharge planning, disease and co-morbidity management, gait/mobility dysfunction, medication management, nutrition and hydration management,Ongoing assessment of safety, Pain management, Patient and family education, Prevention of secondary complications, Skin Integrity, NWB,TTWB, PWB,cognitive impairment, communication impairment. Required therapy: Physical Therapy, Occupational Therapy and Speech Therapy 3 hours per day, 5-6 days per week. Recreational Therapy 1 hour per week. Expected Discharge Destination: Home    Expected Post Discharge Treatments: Out Patient    Other information relevant to the care needs:     Acute Inpatient Rehabilitation Disclosure Statement provided to patient. Patient verbalized understanding. I have reviewed and concur with the findings and results of the pre-admission screening assessment completed by the Inpatient Rehabilitation Admissions Coordinator.     Magnolia Bonilla Junior Jenkins MD

## 2020-02-20 NOTE — PROGRESS NOTES
Hospitalist Progress Note      Patient:  Samuel Bunch    Unit/Bed:7K-28/028-A  YOB: 1965  MRN: [id number]   Acct: [de-identified]   PCP: FABIAN De Dios CNP  Date of Admission: 2/11/2020    Assessment/Plan:    1. Cauda Equina Syndrome at L3-L4 s/p decompressive laminectomy L3-L4: Surgery on 2/13 (POD6). PT/OT. Pt still having residual deficits - bilateral thigh region and LE weakness. Pt is using walker to ambulate. 2. Acute Blood Loss Anemia, post-operatively: Hgb 11.3 to 7.9 to 8.8 to 8.7 to 9.1. 3. Constipation: Possible that this is residual from the Cauda Equina Syndrome and this is neurogenic. Will continue to monitor. 4. Essential HTN: BP has been liable. Continue to monitor. BP medications have hold parameters. 5. Hypokalemia: Resolved. 6. RA, chronically on steroids and DMARDs: Continue home medications. Chief Complaint: Back Pain     Initial H and P:-    62 y.o. female who presented to [address] [place] [address] with complaints of lower extremity weakness. Thighs are reported to be numb and painful. Symptoms located bilateral anterior thighs. Patient endorses lumbar pain and is having difficulty ambulatin x 2-3 weeks. She denies injury. She reports history of lumbar fusion in the past. She notes that she is not having any bowel or bladder incontinence. She denies chest pain or shortness of breath. Denies nausea, vomiting, diarrhea or constipation. Denies fevers. Endorses chills. Patient has history of RA and is on steroids at home. 2/19 - Pt states that she is working hard with PT/OT. She states that her pain is manageable. Insurance denied IP Rehab. Subjective (past 24 hours):   Pt is up and walking with therapy. She states that she is getting stronger and stronger everyday. Pt is still struggling with constipation. She has not had a BM since the suppository.      Peer to Peer had to be rescheduled due to time report electronically signed by Dr. Amanuel Marino on 2/11/2020 9:04 PM      CT LUMBAR RECONSTRUCTION WO POST PROCESS   Final Result   Status post L4-5 and L5-S1 discectomies with disc prostheses and pedicle screw fusion and L4 and L5 laminectomies. No fracture or subluxation. Moderate L3-4 degenerative disc disease. L3-4 possible annular bulge and moderate spinal stenosis with probable impingement upon the right L3 nerve root. See the accompanying abdomen pelvis CT report for discussion of nonspine findings. **This report has been created using voice recognition software. It may contain minor errors which are inherent in voice recognition technology. **         Final report electronically signed by Dr. Amanuel Marino on 2/11/2020 8:56 PM      CT HEAD WO CONTRAST   Final Result      No acute ischemic infarct, hemorrhage, or mass effect. **This report has been created using voice recognition software. It may contain minor errors which are inherent in voice recognition technology. **      Final report electronically signed by Dr. Amanuel Marino on 2/11/2020 8:50 PM      XR CHEST PORTABLE   Final Result      No acute cardiopulmonary disease. **This report has been created using voice recognition software. It may contain minor errors which are inherent in voice recognition technology. **      Final report electronically signed by Dr. Amanuel Marino on 2/11/2020 7:52 PM        Electronically signed by JEREMIAH [place] Res on 2/20/2020 at 3:50 PM

## 2020-02-20 NOTE — PLAN OF CARE
discharge needs. Problem: Infection - Surgical Site:  Goal: Will show no infection signs and symptoms  Description  Will show no infection signs and symptoms  Outcome: Ongoing  Note:   Patient remained free of any signs or symptoms of infection. Patient afebrile. Dressing to back clean, dry, and intact. Problem: Venous Thromboembolism:  Goal: Will show no signs or symptoms of venous thromboembolism  Description  Will show no signs or symptoms of venous thromboembolism  Outcome: Ongoing  Note:   No signs and symptoms of DVT. Calves soft and nontender. Patient compliant with SCDs to help in prevention of DVTs. Care plan reviewed with patient. Patient verbalize understanding of the plan of care and contribute to goal setting.

## 2020-02-20 NOTE — PROGRESS NOTES
[address]  Occupational Therapy  Daily Note  Time:   Time In: 9846  Time Out: 1227  Timed Code Treatment Minutes: 19 Minutes  Minutes: 19          Date: 2020  Patient Name: Ree Bond,   Gender: female      Room: 7-28/028-A  MRN: [id number]  : 1965  (62 y.o.)  Referring Practitioner: Deidra Nunez MD  Diagnosis: Extremity weakness  Additional Pertinent Hx: Ree Bond is a 62 y.o. female whopresents to the emergency department from home for fatigue and extremity weakness secondary to back pain. Patient reports that her back pain shoots into her legs. She reports trouble with movement and getting up. Patient reports a history of similar back pain. She states that she has had 2 back surgeries in the past. Patient is on medications for her chronic pain, which she does take regularly. S/P LUMBAR DECOMPRESSION, DISC REMOVAL BETWEEN L3-4, And extension of previous L4-S1 instrumentation and fusion L3.on 2020    Restrictions/Precautions:  Restrictions/Precautions: Fall Risk, General Precautions  Position Activity Restriction  Spinal Precautions: No Bending, No Lifting, No Twisting    SUBJECTIVE: Patient head elevated in bed    upon arrival.  RN Ok'ed treatment. Patient received the food tray. The therapist asked if she wanted  The MICHAELS to try later. The patient was very interested in finding out more information about AE before being discharged. The patient was agreeable to the therapist providing AE education while she ate. PAIN: no complaints        Patient reports having a tub with shower    ADDITIONAL ACTIVITIES:  Patient report maybe needing a reacher, and a tub chair or bench. Education given on the difference between tub benches and shower chair and purposes of these. Also given  education on consideration for bathroom size and safety. Patient reports having a [facility]  Education given on various places to purchase.    The therapist answered all

## 2020-02-20 NOTE — PROGRESS NOTES
Awaiting peer to peer to be completed today at 1 pm. If peer to peer is approved plan inpatient rehab today.  Patient will go to [phone]

## 2020-02-21 ENCOUNTER — TELEPHONE (OUTPATIENT)
Dept: FAMILY MEDICINE CLINIC | Age: 55
End: 2020-02-21

## 2020-02-21 VITALS
TEMPERATURE: 97 F | SYSTOLIC BLOOD PRESSURE: 100 MMHG | WEIGHT: 183.1 LBS | HEART RATE: 76 BPM | RESPIRATION RATE: 16 BRPM | BODY MASS INDEX: 28.74 KG/M2 | HEIGHT: 67 IN | DIASTOLIC BLOOD PRESSURE: 49 MMHG | OXYGEN SATURATION: 98 %

## 2020-02-21 PROCEDURE — 94760 N-INVAS EAR/PLS OXIMETRY 1: CPT

## 2020-02-21 PROCEDURE — 97110 THERAPEUTIC EXERCISES: CPT

## 2020-02-21 PROCEDURE — 97116 GAIT TRAINING THERAPY: CPT

## 2020-02-21 PROCEDURE — 99239 HOSP IP/OBS DSCHRG MGMT >30: CPT | Performed by: PHYSICIAN ASSISTANT

## 2020-02-21 PROCEDURE — 2580000003 HC RX 258: Performed by: NEUROLOGICAL SURGERY

## 2020-02-21 PROCEDURE — 6370000000 HC RX 637 (ALT 250 FOR IP): Performed by: INTERNAL MEDICINE

## 2020-02-21 PROCEDURE — 6370000000 HC RX 637 (ALT 250 FOR IP): Performed by: NEUROLOGICAL SURGERY

## 2020-02-21 RX ORDER — POLYETHYLENE GLYCOL 3350 17 G/17G
17 POWDER, FOR SOLUTION ORAL DAILY PRN
Qty: 30 EACH | Refills: 0 | Status: SHIPPED | OUTPATIENT
Start: 2020-02-21 | End: 2020-03-22

## 2020-02-21 RX ORDER — PSEUDOEPHEDRINE HCL 30 MG
200 TABLET ORAL DAILY
Qty: 20 CAPSULE | Refills: 0 | Status: SHIPPED | OUTPATIENT
Start: 2020-02-22 | End: 2022-02-08

## 2020-02-21 RX ORDER — PREDNISONE 1 MG/1
15 TABLET ORAL DAILY
Qty: 30 TABLET | Refills: 0 | Status: SHIPPED | OUTPATIENT
Start: 2020-02-22 | End: 2020-03-03

## 2020-02-21 RX ORDER — HYDROCODONE BITARTRATE AND ACETAMINOPHEN 5; 325 MG/1; MG/1
1 TABLET ORAL EVERY 6 HOURS PRN
Qty: 20 TABLET | Refills: 0 | Status: SHIPPED | OUTPATIENT
Start: 2020-02-21 | End: 2020-02-26

## 2020-02-21 RX ORDER — SENNA PLUS 8.6 MG/1
1 TABLET ORAL 2 TIMES DAILY
Qty: 60 TABLET | Refills: 0 | Status: SHIPPED | OUTPATIENT
Start: 2020-02-21 | End: 2020-03-22

## 2020-02-21 RX ADMIN — POLYETHYLENE GLYCOL 3350 17 G: 17 POWDER, FOR SOLUTION ORAL at 07:55

## 2020-02-21 RX ADMIN — HYDROCODONE BITARTRATE AND ACETAMINOPHEN 1 TABLET: 5; 325 TABLET ORAL at 07:55

## 2020-02-21 RX ADMIN — PANTOPRAZOLE SODIUM 40 MG: 40 TABLET, DELAYED RELEASE ORAL at 05:42

## 2020-02-21 RX ADMIN — HYDROCODONE BITARTRATE AND ACETAMINOPHEN 1 TABLET: 5; 325 TABLET ORAL at 14:54

## 2020-02-21 RX ADMIN — TIZANIDINE 4 MG: 4 TABLET ORAL at 07:56

## 2020-02-21 RX ADMIN — SULFASALAZINE 1000 MG: 500 TABLET ORAL at 07:56

## 2020-02-21 RX ADMIN — PREDNISONE 15 MG: 10 TABLET ORAL at 07:57

## 2020-02-21 RX ADMIN — SENNOSIDES 8.6 MG: 8.6 TABLET, FILM COATED ORAL at 07:57

## 2020-02-21 RX ADMIN — DOCUSATE SODIUM 200 MG: 100 CAPSULE, LIQUID FILLED ORAL at 07:50

## 2020-02-21 RX ADMIN — CALCIUM CARBONATE-VITAMIN D TAB 500 MG-200 UNIT 1 TABLET: 500-200 TAB at 07:57

## 2020-02-21 RX ADMIN — ATORVASTATIN CALCIUM 20 MG: 20 TABLET, FILM COATED ORAL at 07:57

## 2020-02-21 RX ADMIN — Medication 10 ML: at 07:55

## 2020-02-21 RX ADMIN — NALOXEGOL OXALATE 12.5 MG: 12.5 TABLET, FILM COATED ORAL at 07:57

## 2020-02-21 RX ADMIN — TIZANIDINE 4 MG: 4 TABLET ORAL at 14:54

## 2020-02-21 ASSESSMENT — PAIN DESCRIPTION - PAIN TYPE
TYPE: SURGICAL PAIN
TYPE: SURGICAL PAIN

## 2020-02-21 ASSESSMENT — PAIN DESCRIPTION - LOCATION
LOCATION: BACK
LOCATION: BACK

## 2020-02-21 ASSESSMENT — PAIN DESCRIPTION - ORIENTATION
ORIENTATION: LOWER
ORIENTATION: LOWER

## 2020-02-21 ASSESSMENT — PAIN - FUNCTIONAL ASSESSMENT
PAIN_FUNCTIONAL_ASSESSMENT: PREVENTS OR INTERFERES SOME ACTIVE ACTIVITIES AND ADLS
PAIN_FUNCTIONAL_ASSESSMENT: PREVENTS OR INTERFERES SOME ACTIVE ACTIVITIES AND ADLS

## 2020-02-21 ASSESSMENT — PAIN DESCRIPTION - ONSET
ONSET: ON-GOING
ONSET: ON-GOING

## 2020-02-21 ASSESSMENT — PAIN DESCRIPTION - PROGRESSION
CLINICAL_PROGRESSION: NOT CHANGED
CLINICAL_PROGRESSION: NOT CHANGED

## 2020-02-21 ASSESSMENT — PAIN SCALES - GENERAL
PAINLEVEL_OUTOF10: 7
PAINLEVEL_OUTOF10: 4
PAINLEVEL_OUTOF10: 4
PAINLEVEL_OUTOF10: 2

## 2020-02-21 ASSESSMENT — PAIN DESCRIPTION - DESCRIPTORS
DESCRIPTORS: ACHING
DESCRIPTORS: ACHING

## 2020-02-21 ASSESSMENT — PAIN DESCRIPTION - FREQUENCY
FREQUENCY: CONTINUOUS
FREQUENCY: CONTINUOUS

## 2020-02-21 NOTE — PROGRESS NOTES
Decreased Step Length Bilaterally, Decreased Gait Speed and Decreased Heel Strike Bilaterally    Balance:  Static Sitting Balance:  Stand By Assistance    Exercise:  Patient was guided in seated set(s) 10 reps of exercise to both lower extremities. Ankle pumps, Glut sets, Long arc quads, Hip abduction/adduction and Seated hip flexion. Exercises were completed for increased independence with functional mobility. Functional Outcome Measures: Completed  AM-PAC Inpatient Mobility without Stair Climbing Raw Score : 15  AM-PAC Inpatient without Stair Climbing T-Scale Score : 43.03    ASSESSMENT:  Assessment: Patient progressing toward established goals. Activity Tolerance:  Patient tolerance of  treatment: good. Pt able to recall 2/3 precautions. Pt up in bedside chair at end of session. Equipment Recommendations:Equipment Needed: Yes  Mobility Devices: Delphia Ricardo: Rolling  Discharge Recommendations:    Continue to assess pending progress, Home with assist PRN, Home with Home health PT, Patient would benefit from continued therapy after discharge    Plan: Times per week: 6-7X O  Times per day: Daily  Specific instructions for Next Treatment: therex and mobility with back precautions    Patient Education  Patient Education: Plan of Care    Goals:  Patient goals : go home  Short term goals  Time Frame for Short term goals: by discharge  Short term goal 1: bed mobility with S  Short term goal 2: transfer with S  Short term goal 3: amb >100'x1 with walker and S to walk safely in home  Short term goal 4: negotiate platform step with walker and SBA to enter home safely  Long term goals  Time Frame for Long term goals : no LTGs set secondary to short ELOS    Following session, patient left in safe position with all fall risk precautions in place.

## 2020-02-21 NOTE — TELEPHONE ENCOUNTER
.Transition of Care visit scheduled.   2/27/2020  Patient is being discharged to home   Date of discharge 02/21/20  Discharge from facility [facility]  Reason for admission back surgery

## 2020-02-21 NOTE — CARE COORDINATION
2/21/20, 10:07 AM  DISCHARGE [facility] day: 9  Location: [facility]/028A Reason for admit: Weakness [R53.1]  Cauda equina compression ([address] [G83.4]   Barriers to Discharge: PT/OT following, pt ambulating 90 feet with walker. Peer to peer review to be completed today at 10:30  PCP: FABIAN Plascencia CNP  Readmission Risk Score: 12%  Patient Discharge Plan: Plan is for inpatient rehab if peer to peer done and insurance approves.

## 2020-02-21 NOTE — DISCHARGE SUMMARY
Hospitalist Discharge Summary        Patient: Benitez Melgar  YOB: 1965  MRN: [id number]   Acct: [de-identified]    Primary Care Physician: FABIAN Rowley CNP    Admit date  2/11/2020    Discharge date:  2/21/2020 1:52 PM    Chief Complaint on presentation :-  Back Pain     Discharge Assessment and Plan:-   1. Cauda Equina Syndrome at L3-L4 s/p decompressive laminectomy L3-L4: Surgery on 2/13 (POD6). PT/OT. Pt still having residual deficits - bilateral thigh region and LE weakness. Pt is using walker to ambulate. 2. Acute Blood Loss Anemia, post-operatively: Hgb 11.3 to 7.9 to 8.8 to 8.7 to 9.1. 3. Constipation: Possible that this is residual from the Cauda Equina Syndrome and this is neurogenic. Will continue to monitor. 4. Essential HTN: BP has been liable. Continue to monitor. BP medications have hold parameters. 5. Hypokalemia: Resolved. 6. RA, chronically on steroids and DMARDs: Continue home medications. Initial H and P and Hospital course:-  62 y.o. female who presented to [address] [place] [address] with complaints of lower extremity weakness. Thighs are reported to be numb and painful. Symptoms located bilateral anterior thighs. Patient endorses lumbar pain and is having difficulty ambulatin x 2-3 weeks. She denies injury. She reports history of lumbar fusion in the past. She notes that she is not having any bowel or bladder incontinence.  She denies chest pain or shortness of breath. Denies nausea, vomiting, diarrhea or constipation. Denies fevers. Endorses chills. Patient has history of RA and is on steroids at home. Pt underwent surgery on 2/13 and he tolerated it well. On 2/14, Patient seen at bedside. Pain is currently better with current analgesia regimen. Notes improvement in numbness, tingling and pains in bilateral legs. No more pain. Sore in low back. Drain remains in. Admits to constipation, no BM since surgery, but does admit to some flatus.  Patient tablet  Commonly known as:  FOSAMAX     ALEVE PO     atorvastatin 20 MG tablet  Commonly known as:  LIPITOR  TAKE 1 TABLET BY MOUTH EVERY DAY     Calcium 600+D 600-800 MG-UNIT Tabs  Generic drug:  Calcium Carb-Cholecalciferol     cyclobenzaprine 10 MG tablet  Commonly known as:  FLEXERIL     diclofenac sodium 1 % Gel  Apply 2 g topically 4 times daily as needed for Pain Apply to right side neck.      losartan-hydrochlorothiazide 50-12.5 MG per tablet  Commonly known as:  Hyzaar  Take 1 tablet by mouth daily     metFORMIN 850 MG tablet  Commonly known as:  Glucophage  Take 1 tablet by mouth daily (with breakfast)     methotrexate 2.5 MG chemo tablet  Commonly known as:  RHEUMATREX     omeprazole 20 MG delayed release capsule  Commonly known as:  PRILOSEC     SIMPONI ARIA IV     sulfaSALAzine 500 MG tablet  Commonly known as:  AZULFIDINE     traMADol 50 MG tablet  Commonly known as:  ULTRAM           Where to Get Your Medications      These medications were sent to [facility]/pharmacy [address] [place] [address] [address] [facility] [phone] -  [phone]  [address] [place] [address]    Phone:  [phone]   · predniSONE 5 MG tablet     You can get these medications from any pharmacy    Bring a paper prescription for each of these medications  · docusate 100 MG Caps  · HYDROcodone-acetaminophen 5-325 MG per tablet  · polyethylene glycol packet  · senna 8.6 MG tablet          Labs :-  Recent Results (from the past 72 hour(s))   CBC    Collection Time: 02/19/20  7:08 AM   Result Value Ref Range    WBC 12.4 (H) 4.8 - 10.8 thou/mm3    RBC 3.11 (L) 4.20 - 5.40 mill/mm3    Hemoglobin 9.1 (L) 12.0 - 16.0 gm/dl    Hematocrit 30.5 (L) 37.0 - 47.0 %    MCV 98.1 81.0 - 99.0 fL    MCH 29.3 26.0 - 33.0 pg    MCHC 29.8 (L) 32.2 - 35.5 gm/dl    RDW-CV 16.3 (H) 11.5 - 14.5 %    RDW-SD 55.4 (H) 35.0 - 45.0 fL    Platelets 242 (H) 996 - 400 thou/mm3    MPV 9.7 9.4 - 12.4 fL   Basic Metabolic Panel    Collection Time: 02/19/20 7:08 AM   Result Value Ref Range    Sodium 143 135 - 145 meq/L    Potassium 3.7 3.5 - 5.2 meq/L    Chloride 105 98 - 111 meq/L    CO2 23 23 - 33 meq/L    Glucose 97 70 - 108 mg/dL    BUN 10 7 - 22 mg/dL    CREATININE 0.4 0.4 - 1.2 mg/dL    Calcium 9.3 8.5 - 10.5 mg/dL   Anion Gap    Collection Time: 02/19/20  7:08 AM   Result Value Ref Range    Anion Gap 15.0 8.0 - 16.0 meq/L   Glomerular Filtration Rate, Estimated    Collection Time: 02/19/20  7:08 AM   Result Value Ref Range    Est, Glom Filt Rate >90 ml/min/1.73m2        Microbiology:    Blood culture #1:   Lab Results   Component Value Date    BC No growth-preliminary No growth  02/11/2020     Urinalysis:      Lab Results   Component Value Date    NITRU NEGATIVE 02/11/2020    WBCUA 0-2 02/11/2020    BACTERIA NONE SEEN 02/11/2020    RBCUA 3-5 02/11/2020    BLOODU NEGATIVE 02/11/2020    SPECGRAV 1.029 05/24/2013    GLUCOSEU NEGATIVE 02/11/2020       Radiology:-  Ct Head Wo Contrast    Result Date: 2/11/2020  PROCEDURE: CT HEAD WO CONTRAST CLINICAL INFORMATION: weakness. COMPARISON: Noncontrast brain CT dated 8/10/2019 TECHNIQUE: Noncontrast 5 mm axial images were obtained through the brain. Sagittal and coronal reconstructions were obtained and reviewed. All CT scans at this facility use dose modulation, iterative reconstruction, and/or weight-based dosing when appropriate to reduce radiation dose to as low as reasonably achievable. FINDINGS: Brain: There is no acute ischemic infarct, hemorrhage, midline shift, mass, or mass effect. There is no focal abnormality of brain parenchymal attenuation. Ventricles/basal cisterns: The ventricles and cisterns are of appropriate size and configuration for the patient's age. No evidence of obstructive hydrocephalus.  Skull base/calvarium/osseous structures: Unremarkable Soft tissues: Unremarkable Intraorbital contents: Unremarkable Sinuses: Unremarkable; the imaged sinuses are clear without evidence of mucosal thickening or fluid levels. Mastoids: Unremarkable; the mastoid air cells are clear. No acute ischemic infarct, hemorrhage, or mass effect. **This report has been created using voice recognition software. It may contain minor errors which are inherent in voice recognition technology. ** Final report electronically signed by Dr. Tata Garcia on 2/11/2020 8:50 PM    Mri Lumbar Spine Wo Contrast    Result Date: 2/12/2020  PROCEDURE: MRI LUMBAR SPINE WO CONTRAST CLINICAL INFORMATION: pain. Lower extremity weakness. Thighs are numb and painful. Difficulty walking for 2 to 3 weeks. COMPARISON: Lumbar spine CT 2/11/2020. Lumbar spine MRI 12/31/2014. TECHNIQUE: Sagittal and axial T1 and T2-weighted images were obtained through the lumbar spine. FINDINGS: The patient has had lumbar fusion and laminectomy at L4-S1. There is stable straightening of the normal lumbar lordosis. There is new loss of disc height at the L3-4 level. This is the level above the fusion. There is edema in the endplates. There does appear fusion across the discs at the L4-5 and L5-S1 levels. There are no compression fractures. No pars defects are noted. There is normal signal in the disks above the L3-4 level. The distal spinal cord, conus medullaris and cauda equina are normal. There are no gross abnormalities in the visualized aspects of the distal thoracic spine. On the axial images, at T12-L1, there are no degenerative changes. The disc is normal. There is no spinal canal or foraminal stenosis. At L1-L2, there are mild facet degenerative changes. The disc is normal. There is no spinal canal or foraminal stenosis. At L2-L3, there are mild facet degenerative changes. There is some thickening of ligamentum flavum. There is mild stenosis of the thecal sac. There is no foraminal stenosis. At L3-L4, there is loss of disc height. There is a large central disc protrusion which migrates superiorly in the left lateral recess.  There are moderate severity facet is a defect in the posterior medial left ilium likely representing a bone graft donor site. No acute traumatic abnormality of the solid or hollow viscera of the abdomen and pelvis. No acute inflammatory or infectious process in the abdomen or pelvis. Colonic diverticulosis without evidence of diverticulitis. No evidence of bowel obstruction. Mild bilateral external iliac and inguinal probably reactive adenopathy. Postoperative changes of the lumbar spine, see above. Bibasilar interstitial infiltrates, acute versus chronic, see above. **This report has been created using voice recognition software. It may contain minor errors which are inherent in voice recognition technology. ** Final report electronically signed by Dr. Patricia Mccarthy on 2/11/2020 9:04 PM    Xr Chest Portable    Result Date: 2/11/2020  PROCEDURE: XR CHEST PORTABLE CLINICAL INFORMATION: fatigue. COMPARISON: Chest x-ray dated 12/9/2013 TECHNIQUE: AP Portable chest xray FINDINGS: Lines/tubes/devices: none Lungs/pleura:  No pneumonia, pulmonary edema, or obvious mass. No pleural effusion. No pneumothorax. Heart: Heart size is normal. Mediastinum/suzan: No obvious mass or adenopathy. Skeleton: No significant bone or joint abnormality. No acute cardiopulmonary disease. **This report has been created using voice recognition software. It may contain minor errors which are inherent in voice recognition technology. ** Final report electronically signed by Dr. Patricia Mccarthy on 2/11/2020 7:52 PM    Ct Lumbar Reconstruction Wo Post Process    Result Date: 2/11/2020  PROCEDURE: CT LUMBAR RECONSTRUCTION WO POST PROCESS CLINICAL INFORMATION: Trauma. COMPARISON: Lumbar spine MRI dated 12/31/2014 and the lumbar spine x-rays dated 10/20/2014 TECHNIQUE: 3 mm axial CT images were reconstructed through the lumbar spine. These are reconstructed from the patient's abdomen and pelvis CT. IV contrast is present. Sagittal and coronal reconstructions were obtained.  All electronically signed by Dr. Roger Curry on 2/11/2020 8:56 PM    Mri Brain Wo Contrast    Result Date: 2/12/2020  PROCEDURE: MRI BRAIN WO CONTRAST CLINICAL INFORMATION weakness. Lower extremity weakness. Thighs are numb and painful. COMPARISON: Head CT 2/11/2020. TECHNIQUE: Multiplanar and multiple spin echo MRI images were obtained of the brain without contrast. FINDINGS: The diffusion-weighted images are normal.  The brain volume is normal. There is minimal signal hyperintensity scattered in the white matter of the brain suggestive of minimal severity chronic small vessel ischemic changes. There are no intra-or extra-axial collections. There is no hydrocephalus, midline shift or mass effect. There is mineralization in the medial aspects of the basal ganglia bilaterally. No other areas of susceptibility artifact are present. The major intracranial vascular flow voids are present. The midline craniocervical junction structures are normal.  There is a partially empty sella turcica. The brainstem appears normal.     1. No acute findings. 2. Minimal severity chronic small vessel ischemic changes. **This report has been created using voice recognition software. It may contain minor errors which are inherent in voice recognition technology. ** Final report electronically signed by Dr. Ayan Mcghee on 2/12/2020 7:44 AM       Follow-up scheduled after discharge :-    in the next few days with FABIAN De Dios - CNP  in the next few days with Neurosurgery    Consultations during this hospital stay:-  [] NONE [] Cardiology  [] Nephrology  [] Hemo onco   [] GI   [] ID  [] Endocrine  [] Pulm    [] Neuro    [] Psych   [] Urology  [] ENT   [] G SURGERY   []Ortho    []CV surg    [] Palliative  [] Hospice [] Pain management   []    []TCU   [] PT/OT  OTHERS:- Neurosurgery     Disposition: home with HH  Condition at Discharge: Stable    Time Spent:- >100 minutes    Electronically signed by JEREMIAH Carter on 2/21/2020 at [address] [facility]

## 2020-02-24 ENCOUNTER — TELEPHONE (OUTPATIENT)
Dept: FAMILY MEDICINE CLINIC | Age: 55
End: 2020-02-24

## 2020-02-26 ENCOUNTER — OFFICE VISIT (OUTPATIENT)
Dept: NEUROSURGERY | Age: 55
End: 2020-02-26

## 2020-02-26 VITALS
HEART RATE: 72 BPM | SYSTOLIC BLOOD PRESSURE: 102 MMHG | HEIGHT: 67 IN | BODY MASS INDEX: 27.65 KG/M2 | WEIGHT: 176.2 LBS | DIASTOLIC BLOOD PRESSURE: 64 MMHG

## 2020-02-26 PROCEDURE — 99024 POSTOP FOLLOW-UP VISIT: CPT | Performed by: PHYSICIAN ASSISTANT

## 2020-02-26 RX ORDER — PREDNISONE 1 MG/1
1 TABLET ORAL DAILY
COMMUNITY
Start: 2020-01-08 | End: 2020-08-27 | Stop reason: DRUGHIGH

## 2020-02-26 NOTE — PROGRESS NOTES
[address] [address]  Dept: [phone]  Dept Fax: [phone]  Loc: [facility] Follow Visit  Visit Date: 2/26/2020      Ranjit Santos  is a 62 y.o. female who is returning to the office today for a post-op follow-up visit. She had surgery on 2/13/2020 with Dr. Evans Ellison to undergo for ration and revision of prior lumbar fusion section of disc herniation and extension of her lumbar fusion to lumbar 3, without complication. She presents today primarily for suture removal incision inspection. She is ambulating with assistance of a rolling four-point walker with pain well-controlled. She is still taking medications for pain but is reduced the amount of pain medications necessary to maintain adequate pain control. She is ambulating and notes improvement of prior numbness and tingling and weakness. Encouraged to keep to a minimum any bending, lifting, or twisting until her next scheduled follow-up in 4 weeks. Physical exam she is stable and grossly intact with pain well-controlled and her incision is healing nicely. We will remove the surgical sutures and lone remaining staples today and apply Steri-Strips to the incision site. She is encouraged to keep the Steri-Strips in place for 3 to 5 days removing any remaining Steri-Strips after 1 week. He is, overall, very happy with the outcome of her procedure. Based on exam findings and her improvement from surgery we have agreed to see her in 4 weeks time where we will reevaluate her improvement and continued healing of her incision. She is encouraged in the interim to contact her office with any additional questions or concerns related to her surgery, her subsequent follow-up recovery in care or should she experience any significant changes in her general health.     · Patient was evaluated today and is doing very well overall.   · No new complaints were voiced. · Patient  lives with their family  · Wound: wound healing as expected  · Follow-up Studies: No orders of the defined types were placed in this encounter. ·      Assessment/Plan:  · Status Post reexploration and revision of lumbar fusion with extension to lumbar 3 performed by Dr. Wanda Dye, without complication. · Doing very well overall  · Encouraged gradual increase in physical and mental activity. · Fall precaution and home safety education provided to patient.   · Follow-up: Follow-up in 4 weeks      Electronically signed by Juan Carlos Botello PA-C on 2/26/20 at 2:41 PM

## 2020-02-27 ENCOUNTER — OFFICE VISIT (OUTPATIENT)
Dept: FAMILY MEDICINE CLINIC | Age: 55
End: 2020-02-27
Payer: COMMERCIAL

## 2020-02-27 VITALS
WEIGHT: 175.3 LBS | RESPIRATION RATE: 12 BRPM | DIASTOLIC BLOOD PRESSURE: 60 MMHG | BODY MASS INDEX: 27.45 KG/M2 | SYSTOLIC BLOOD PRESSURE: 112 MMHG | HEART RATE: 56 BPM

## 2020-02-27 PROBLEM — S42.401A CLOSED FRACTURE OF RIGHT ELBOW: Status: ACTIVE | Noted: 2020-02-27

## 2020-02-27 PROBLEM — S16.1XXA STRAIN OF NECK MUSCLE: Status: ACTIVE | Noted: 2020-02-27

## 2020-02-27 PROBLEM — S92.413A CLOSED FRACTURE OF PROXIMAL PHALANX OF GREAT TOE: Status: ACTIVE | Noted: 2020-02-27

## 2020-02-27 PROCEDURE — 99495 TRANSJ CARE MGMT MOD F2F 14D: CPT | Performed by: NURSE PRACTITIONER

## 2020-02-27 RX ORDER — ATORVASTATIN CALCIUM 20 MG/1
20 TABLET, FILM COATED ORAL DAILY
Qty: 90 TABLET | Refills: 3
Start: 2020-02-27 | End: 2020-08-06

## 2020-02-27 SDOH — ECONOMIC STABILITY: FOOD INSECURITY: WITHIN THE PAST 12 MONTHS, YOU WORRIED THAT YOUR FOOD WOULD RUN OUT BEFORE YOU GOT MONEY TO BUY MORE.: NEVER TRUE

## 2020-02-27 SDOH — ECONOMIC STABILITY: TRANSPORTATION INSECURITY
IN THE PAST 12 MONTHS, HAS LACK OF TRANSPORTATION KEPT YOU FROM MEETINGS, WORK, OR FROM GETTING THINGS NEEDED FOR DAILY LIVING?: NO

## 2020-02-27 SDOH — ECONOMIC STABILITY: INCOME INSECURITY: HOW HARD IS IT FOR YOU TO PAY FOR THE VERY BASICS LIKE FOOD, HOUSING, MEDICAL CARE, AND HEATING?: NOT HARD AT ALL

## 2020-02-27 SDOH — ECONOMIC STABILITY: FOOD INSECURITY: WITHIN THE PAST 12 MONTHS, THE FOOD YOU BOUGHT JUST DIDN'T LAST AND YOU DIDN'T HAVE MONEY TO GET MORE.: NEVER TRUE

## 2020-02-27 SDOH — ECONOMIC STABILITY: TRANSPORTATION INSECURITY
IN THE PAST 12 MONTHS, HAS THE LACK OF TRANSPORTATION KEPT YOU FROM MEDICAL APPOINTMENTS OR FROM GETTING MEDICATIONS?: NO

## 2020-02-27 ASSESSMENT — PATIENT HEALTH QUESTIONNAIRE - PHQ9
1. LITTLE INTEREST OR PLEASURE IN DOING THINGS: 0
SUM OF ALL RESPONSES TO PHQ QUESTIONS 1-9: 0
SUM OF ALL RESPONSES TO PHQ QUESTIONS 1-9: 0
SUM OF ALL RESPONSES TO PHQ9 QUESTIONS 1 & 2: 0
2. FEELING DOWN, DEPRESSED OR HOPELESS: 0

## 2020-02-27 ASSESSMENT — ENCOUNTER SYMPTOMS
BACK PAIN: 1
COUGH: 0
NAUSEA: 0
ABDOMINAL PAIN: 0
SHORTNESS OF BREATH: 0

## 2020-03-19 ENCOUNTER — TELEPHONE (OUTPATIENT)
Dept: NEUROSURGERY | Age: 55
End: 2020-03-19

## 2020-03-19 RX ORDER — TRAMADOL HYDROCHLORIDE 50 MG/1
50 TABLET ORAL EVERY 4 HOURS PRN
Qty: 42 TABLET | Refills: 0 | Status: SHIPPED | OUTPATIENT
Start: 2020-03-19 | End: 2020-03-26

## 2020-03-19 RX ORDER — CYCLOBENZAPRINE HCL 10 MG
10 TABLET ORAL NIGHTLY PRN
Qty: 30 TABLET | Refills: 0 | Status: SHIPPED | OUTPATIENT
Start: 2020-03-19 | End: 2020-03-29

## 2020-04-21 ENCOUNTER — TELEPHONE (OUTPATIENT)
Dept: FAMILY MEDICINE CLINIC | Age: 55
End: 2020-04-21

## 2020-05-21 ENCOUNTER — TELEPHONE (OUTPATIENT)
Dept: FAMILY MEDICINE CLINIC | Age: 55
End: 2020-05-21

## 2020-05-21 LAB
ABSOLUTE BASO #: 0 X10E9/L (ref 0–0.9)
ABSOLUTE EOS #: 0.1 X10E9/L (ref 0–0.4)
ABSOLUTE LYMPH #: 1.4 X10E9/L (ref 1–3.5)
ABSOLUTE MONO #: 0.3 X10E9/L (ref 0–0.9)
ABSOLUTE NEUT #: 4.9 X10E9/L (ref 1.5–6.6)
ALT SERPL-CCNC: 9 U/L (ref 0–31)
BASOPHILS RELATIVE PERCENT: 0.7 %
CREAT SERPL-MCNC: 0.54 MG/DL (ref 0.4–1)
EGFR AFRICAN AMERICAN: >60 ML/MIN/1.73SQ.M
EGFR IF NONAFRICAN AMERICAN: >60 ML/MIN/1.73SQ.M
EOSINOPHILS RELATIVE PERCENT: 2.2 %
FERRITIN: 49 NG/ML (ref 11–307)
FOLATE: 4.7 NG/ML
HCT VFR BLD CALC: 35.2 % (ref 35–47)
HEMOGLOBIN: 11.1 G/DL (ref 11.7–16)
IRON SATURATION: 10 % SATURATION (ref 15–50)
IRON, SERUM: 27 UG/DL (ref 50–170)
LYMPHOCYTE %: 21 %
MCH RBC QN AUTO: 27.2 PG (ref 26–33.5)
MCHC RBC AUTO-ENTMCNC: 31.6 G/DL (ref 32–36)
MCV RBC AUTO: 86 FL (ref 81–100)
MONOCYTES # BLD: 4.7 %
NEUTROPHILS RELATIVE PERCENT: 71.4 %
PDW BLD-RTO: 19.2 % (ref 11.5–14.7)
PLATELETS: 451 X10E9/L (ref 150–450)
PMV BLD AUTO: 10.2 FL (ref 7–12)
RBC: 4.09 X10E12/L (ref 3.8–5.2)
SEDIMENTATION RATE, ERYTHROCYTE: 96 MM/H (ref 0–30)
TOTAL IRON BINDING CAPACITY: 284 UG/DL (ref 250–425)
VITAMIN B-12: 326 PG/ML (ref 180–914)
WBC: 6.8 X10E9/L (ref 4.8–10.8)

## 2020-05-21 NOTE — TELEPHONE ENCOUNTER
----- Message from FABIAN Monteiro CNP sent at 5/21/2020  8:56 AM EDT -----  Notify blood levels improved.    Would recommend a Folate and Iron supplement Daily to improve

## 2020-05-21 NOTE — TELEPHONE ENCOUNTER
Pt was notified and voiced understanding. Pt says years ago she took an iron pill that did not cause constipation, but could not remember the name. She is asking if you would recommend one for her to take. Please advise. No

## 2020-05-22 ENCOUNTER — OFFICE VISIT (OUTPATIENT)
Dept: NEUROSURGERY | Age: 55
End: 2020-05-22
Payer: COMMERCIAL

## 2020-05-22 VITALS
DIASTOLIC BLOOD PRESSURE: 69 MMHG | SYSTOLIC BLOOD PRESSURE: 122 MMHG | BODY MASS INDEX: 24.89 KG/M2 | HEIGHT: 67 IN | HEART RATE: 102 BPM | WEIGHT: 158.6 LBS | TEMPERATURE: 97.9 F

## 2020-05-22 PROCEDURE — 99214 OFFICE O/P EST MOD 30 MIN: CPT | Performed by: PHYSICIAN ASSISTANT

## 2020-05-22 PROCEDURE — G8427 DOCREV CUR MEDS BY ELIG CLIN: HCPCS | Performed by: PHYSICIAN ASSISTANT

## 2020-05-22 PROCEDURE — G8420 CALC BMI NORM PARAMETERS: HCPCS | Performed by: PHYSICIAN ASSISTANT

## 2020-05-22 PROCEDURE — 1036F TOBACCO NON-USER: CPT | Performed by: PHYSICIAN ASSISTANT

## 2020-05-22 PROCEDURE — 3017F COLORECTAL CA SCREEN DOC REV: CPT | Performed by: PHYSICIAN ASSISTANT

## 2020-05-22 NOTE — PROGRESS NOTES
fusion with extension to lumbar 3 performed by Dr. Azeem Mahan, without complication. · Doing very well overall  · Encouraged gradual increase in physical and mental activity. · Fall precaution and home safety education provided to patient. · Follow-up: Low up in approximately 3 months with a new CT scan of the lumbar spine to ascertain progress towards fusion.       Electronically signed by Bina Cuevas PA-C on 5/22/20 at 8:59 AM EDT

## 2020-06-18 ENCOUNTER — HOSPITAL ENCOUNTER (OUTPATIENT)
Age: 55
Discharge: HOME | End: 2020-06-18
Payer: COMMERCIAL

## 2020-06-18 ENCOUNTER — HOSPITAL ENCOUNTER (OUTPATIENT)
Dept: GENERAL RADIOLOGY | Age: 55
Discharge: HOME | End: 2020-06-18
Payer: COMMERCIAL

## 2020-06-18 PROCEDURE — 71046 X-RAY EXAM CHEST 2 VIEWS: CPT

## 2020-08-06 RX ORDER — ATORVASTATIN CALCIUM 20 MG/1
TABLET, FILM COATED ORAL
Qty: 90 TABLET | Refills: 3 | Status: SHIPPED | OUTPATIENT
Start: 2020-08-06 | End: 2020-12-18 | Stop reason: SDUPTHER

## 2020-08-21 ENCOUNTER — OFFICE VISIT (OUTPATIENT)
Dept: NEUROSURGERY | Age: 55
End: 2020-08-21
Payer: COMMERCIAL

## 2020-08-21 VITALS
SYSTOLIC BLOOD PRESSURE: 112 MMHG | HEART RATE: 85 BPM | WEIGHT: 163 LBS | TEMPERATURE: 97.6 F | HEIGHT: 67 IN | BODY MASS INDEX: 25.58 KG/M2 | DIASTOLIC BLOOD PRESSURE: 66 MMHG

## 2020-08-21 PROCEDURE — 3017F COLORECTAL CA SCREEN DOC REV: CPT | Performed by: PHYSICIAN ASSISTANT

## 2020-08-21 PROCEDURE — 1036F TOBACCO NON-USER: CPT | Performed by: PHYSICIAN ASSISTANT

## 2020-08-21 PROCEDURE — G8427 DOCREV CUR MEDS BY ELIG CLIN: HCPCS | Performed by: PHYSICIAN ASSISTANT

## 2020-08-21 PROCEDURE — G8417 CALC BMI ABV UP PARAM F/U: HCPCS | Performed by: PHYSICIAN ASSISTANT

## 2020-08-21 PROCEDURE — 99214 OFFICE O/P EST MOD 30 MIN: CPT | Performed by: PHYSICIAN ASSISTANT

## 2020-08-21 NOTE — PROGRESS NOTES
in this encounter. ·      Assessment/Plan:  · Status Post status post lumbar fusion. · Doing very well overall  · Encouraged gradual increase in physical and mental activity. · Fall precaution and home safety education provided to patient.   · Follow-up: As needed      Electronically signed by Rebecca Benites PA-C on 8/21/20 at 9:35 AM EDT

## 2020-08-27 ENCOUNTER — OFFICE VISIT (OUTPATIENT)
Dept: FAMILY MEDICINE CLINIC | Age: 55
End: 2020-08-27
Payer: COMMERCIAL

## 2020-08-27 VITALS
SYSTOLIC BLOOD PRESSURE: 118 MMHG | WEIGHT: 167 LBS | DIASTOLIC BLOOD PRESSURE: 68 MMHG | BODY MASS INDEX: 26.16 KG/M2 | HEART RATE: 78 BPM | RESPIRATION RATE: 14 BRPM | TEMPERATURE: 96.9 F

## 2020-08-27 PROBLEM — I10 ESSENTIAL (PRIMARY) HYPERTENSION: Status: RESOLVED | Noted: 2018-04-23 | Resolved: 2020-08-27

## 2020-08-27 PROBLEM — R20.0 ANTERIOR THIGH NUMBNESS: Status: RESOLVED | Noted: 2020-02-11 | Resolved: 2020-08-27

## 2020-08-27 PROBLEM — M54.9 INTRACTABLE BACK PAIN: Status: RESOLVED | Noted: 2020-02-11 | Resolved: 2020-08-27

## 2020-08-27 PROBLEM — R53.1 WEAKNESS: Status: RESOLVED | Noted: 2020-02-11 | Resolved: 2020-08-27

## 2020-08-27 PROBLEM — R79.82 ELEVATED C-REACTIVE PROTEIN (CRP): Status: RESOLVED | Noted: 2020-02-11 | Resolved: 2020-08-27

## 2020-08-27 PROBLEM — R91.8 LUNG INFILTRATE ON CT: Status: RESOLVED | Noted: 2020-02-11 | Resolved: 2020-08-27

## 2020-08-27 PROBLEM — S42.401A CLOSED FRACTURE OF RIGHT ELBOW: Status: RESOLVED | Noted: 2020-02-27 | Resolved: 2020-08-27

## 2020-08-27 PROBLEM — S92.413A CLOSED FRACTURE OF PROXIMAL PHALANX OF GREAT TOE: Status: RESOLVED | Noted: 2020-02-27 | Resolved: 2020-08-27

## 2020-08-27 PROBLEM — R70.0 ELEVATED SED RATE: Status: RESOLVED | Noted: 2020-02-11 | Resolved: 2020-08-27

## 2020-08-27 PROBLEM — Z78.9 DECREASED ACTIVITIES OF DAILY LIVING (ADL): Status: RESOLVED | Noted: 2020-02-11 | Resolved: 2020-08-27

## 2020-08-27 PROBLEM — S16.1XXA STRAIN OF NECK MUSCLE: Status: RESOLVED | Noted: 2020-02-27 | Resolved: 2020-08-27

## 2020-08-27 PROCEDURE — 99396 PREV VISIT EST AGE 40-64: CPT | Performed by: NURSE PRACTITIONER

## 2020-08-27 RX ORDER — FOLIC ACID 1 MG/1
TABLET ORAL
COMMUNITY
Start: 2020-07-02

## 2020-08-27 RX ORDER — PREDNISONE 1 MG/1
10 TABLET ORAL DAILY
Qty: 30 TABLET | Refills: 0 | Status: SHIPPED
Start: 2020-08-27

## 2020-08-27 RX ORDER — FERROUS SULFATE 325(65) MG
650 TABLET ORAL
COMMUNITY

## 2020-08-27 RX ORDER — LEFLUNOMIDE 10 MG/1
10 TABLET ORAL DAILY
COMMUNITY
End: 2020-12-18

## 2020-08-27 ASSESSMENT — ENCOUNTER SYMPTOMS
ABDOMINAL PAIN: 0
NAUSEA: 0
COUGH: 0
BACK PAIN: 1

## 2020-08-27 NOTE — PROGRESS NOTES
Lab Results   Component Value Date    TSH 2.640 02/11/2020       Lab Results   Component Value Date    WBC 6.8 05/20/2020    HGB 11.1 (L) 05/20/2020    HCT 35.2 05/20/2020    MCV 86 05/20/2020     (H) 05/20/2020         Health Maintenance   Topic Date Due    Shingles Vaccine (1 of 2) 06/14/2013    Pneumococcal 0-64 years Vaccine (2 of 3 - PCV13) 08/01/2014    Cervical cancer screen  06/01/2016    A1C test (Diabetic or Prediabetic)  07/22/2020    Lipid screen  07/22/2020    Flu vaccine (1) 09/01/2020    Potassium monitoring  02/19/2021    Creatinine monitoring  05/20/2021    Breast cancer screen  06/18/2021    Colon cancer screen colonoscopy  01/23/2024    DTaP/Tdap/Td vaccine (2 - Td) 07/10/2029    Hepatitis C screen  Completed    HIV screen  Completed    Hepatitis A vaccine  Aged Out    Hepatitis B vaccine  Aged Out    Hib vaccine  Aged Out    Meningococcal (ACWY) vaccine  Aged Out       Immunization History   Administered Date(s) Administered    FLUARIX 3 YEARS AND OVER 11/02/2015    Influenza Virus Vaccine 10/01/2013, 09/22/2014    Influenza, Quadv, IM, PF (6 mo and older Fluzone, Flulaval, Fluarix, and 3 yrs and older Afluria) 09/13/2016, 09/11/2018, 09/17/2019    Pneumococcal Polysaccharide ([id number]) 08/01/2013    Tdap (Boostrix, Adacel) 07/10/2019       Review of Systems   Constitutional: Positive for fatigue. Negative for chills and fever. HENT: Negative. Eyes: Negative for visual disturbance. Respiratory: Negative for cough. Gastrointestinal: Negative for abdominal pain and nausea. Musculoskeletal: Positive for arthralgias and back pain. Skin: Negative for rash. Neurological: Negative for dizziness and light-headedness. Psychiatric/Behavioral: Negative. Objective:   Physical Exam  Constitutional:       General: She is not in acute distress. Appearance: She is well-developed.    HENT:      Right Ear: Tympanic membrane normal.      Left Ear: Tympanic membrane normal.      Nose: Nose normal.   Cardiovascular:      Rate and Rhythm: Normal rate and regular rhythm. Pulses: Normal pulses. Heart sounds: Normal heart sounds, S1 normal and S2 normal. No murmur. No S3 sounds. Pulmonary:      Effort: Pulmonary effort is normal.      Breath sounds: Normal breath sounds. No decreased breath sounds, wheezing or rhonchi. Abdominal:      General: Bowel sounds are normal.      Palpations: Abdomen is soft. Tenderness: There is no abdominal tenderness. Neurological:      Mental Status: She is alert and oriented to person, place, and time. Cranial Nerves: No cranial nerve deficit. Sensory: No sensory deficit. Coordination: Coordination normal.      Gait: Gait normal.   Psychiatric:         Behavior: Behavior normal.         Assessment:       Diagnosis Orders   1. Well adult exam  CBC    Lipid Panel    Comprehensive Metabolic Panel    Hemoglobin A1C    TSH with Reflex    Hemoglobin A1C   2. Mixed hyperlipidemia     3. IFG (impaired fasting glucose)     4. Rheumatoid arthritis, involving unspecified site, unspecified rheumatoid factor presence ([facility] [address]     5. Immunosuppression due to chronic steroid use     6.  Hx of decompressive lumbar laminectomy             Plan:      Chronic conditions stabe  Diet and exercise and weight loss discussed  Screening Labs  Follow up with Specialists  Preventative UTD - Shingles check with insurance  Preventative UTD  A1C repeat in 6 months  RTO in 1 year

## 2020-08-27 NOTE — PROGRESS NOTES
Chronic Disease Visit Information    BP Readings from Last 3 Encounters:   08/27/20 118/68   08/21/20 112/66   05/22/20 122/69          Hemoglobin A1C (%)   Date Value   07/22/2019 6.2   06/12/2018 6.0 (H)   05/11/2018 5.9 (H)     LDL Calculated (mg/dL)   Date Value   07/22/2019 84     HDL (mg/dL)   Date Value   07/22/2019 41     BUN (mg/dL)   Date Value   02/19/2020 10     CREATININE (mg/dL)   Date Value   05/20/2020 0.54     Glucose (mg/dL)   Date Value   02/19/2020 97   07/22/2019 103 (H)            Have you changed or started any medications since your last visit including any over-the-counter medicines, vitamins, or herbal medicines? yes -    Are you having any side effects from any of your medications? -  no  Have you stopped taking any of your medications? Is so, why? -  yes -     Have you seen any other physician or provider since your last visit? Yes - Records Requested  Have you had any other diagnostic tests since your last visit? No  Have you been seen in the emergency room and/or had an admission to a hospital since we last saw you? No  Have you had your annual diabetic retinal (eye) exam? No  Have you had your routine dental cleaning in the past 6 months? yes -     Have you activated your [employer] account? If not, what are your barriers?  Yes     Patient Care Team:  FABIAN Arndt CNP as PCP - General (Nurse Practitioner)  FABIAN Arndt CNP as PCP - [facility] Provider         Medical History Review  Past Medical, Family, and Social History reviewed and does contribute to the patient presenting condition    Health Maintenance   Topic Date Due    Shingles Vaccine (1 of 2) 06/14/2013    Pneumococcal 0-64 years Vaccine (2 of 3 - PCV13) 08/01/2014    Cervical cancer screen  06/01/2016    A1C test (Diabetic or Prediabetic)  07/22/2020    Lipid screen  07/22/2020    Flu vaccine (1) 09/01/2020    Potassium monitoring  02/19/2021    Creatinine monitoring  05/20/2021    Breast cancer

## 2020-08-27 NOTE — PATIENT INSTRUCTIONS
You may receive a survey regarding the care you received during your visit. Your input is valuable to us. We encourage you to complete and return your survey. We hope you will choose us in the future for your healthcare needs. Patient Education        Prediabetes: Care Instructions  Overview     Prediabetes is a warning sign that you're at risk for getting type 2 diabetes. It means that your blood sugar is higher than it should be. But it's not high enough to be diabetes. The food you eat naturally turns into sugar. Your body uses the sugar for energy. Normally, an organ called the pancreas makes insulin. And insulin allows the sugar in your blood to get into your body's cells. But sometimes the body can't use insulin the right way. So the sugar stays in your blood instead. This is called insulin resistance. The buildup of sugar in your blood means you have prediabetes. The good news is that you may be able to prevent or delay diabetes. Making small lifestyle changes, like getting active and changing your eating habits, may help you get your blood sugar back to normal. You can work with your doctor to make a treatment plan. Follow-up care is a key part of your treatment and safety. Be sure to make and go to all appointments, and call your doctor if you are having problems. It's also a good idea to know your test results and keep a list of the medicines you take. How can you care for yourself at home? · Watch your weight. A healthy weight helps your body use insulin properly. · Limit the amount of calories, sweets, and unhealthy fat you eat. Ask your doctor if you should see a dietitian. A registered dietitian can help you create meal plans that fit your lifestyle. · Get at least 30 minutes of exercise on most days of the week. Exercise helps control your blood sugar. It also helps you maintain a healthy weight. Walking is a good choice.  You also may want to do other activities, such as running, swimming, cycling, or playing tennis or team sports. · Do not smoke. Smoking can make prediabetes worse. If you need help quitting, talk to your doctor about stop-smoking programs and medicines. These can increase your chances of quitting for good. · If your doctor prescribed medicines, take them exactly as prescribed. Call your doctor if you think you are having a problem with your medicine. You will get more details on the specific medicines your doctor prescribes. When should you call for help? Watch closely for changes in your health, and be sure to contact your doctor if:  · You have any symptoms of diabetes. These may include:  ? Being thirsty more often. ? Urinating more. ? Being hungrier. ? Losing weight. ? Being very tired. ? Having blurry vision. · You have a wound that will not heal.  · You have an infection that will not go away. · You have problems with your blood pressure. · You want more information about diabetes and how you can keep from getting it. Where can you learn more? Go to [email or web address] [email or web address] org and sign in to your [employer] account. Enter I222 in the Zinio box to learn more about \"Prediabetes: Care Instructions. \"     If you do not have an account, please click on the \"Sign Up Now\" link. Current as of: December 20, 2019               Content Version: 12.5  © 4984-8146 Healthwise, Incorporated. Care instructions adapted under license by [facility] ([facility]). If you have questions about a medical condition or this instruction, always ask your healthcare professional. [place] [address] any warranty or liability for your use of this information.

## 2020-08-31 LAB
ABSOLUTE BASO #: 0 X10E9/L (ref 0–0.2)
ABSOLUTE EOS #: 0.1 X10E9/L (ref 0–0.4)
ABSOLUTE LYMPH #: 1.5 X10E9/L (ref 1–3.5)
ABSOLUTE MONO #: 0.2 X10E9/L (ref 0–0.9)
ABSOLUTE NEUT #: 5.3 X10E9/L (ref 1.5–6.6)
ALBUMIN SERPL-MCNC: 3.8 G/DL (ref 3.2–5.3)
ALK PHOSPHATASE: 74 U/L (ref 39–130)
ALT SERPL-CCNC: 23 U/L (ref 0–31)
ANION GAP SERPL CALCULATED.3IONS-SCNC: 10 MMOL/L (ref 5–15)
AST SERPL-CCNC: 22 U/L (ref 0–41)
AVERAGE GLUCOSE: 111 MG/DL
BASOPHILS RELATIVE PERCENT: 0.7 %
BILIRUB SERPL-MCNC: 0.4 MG/DL (ref 0.3–1.2)
BUN BLDV-MCNC: 6 MG/DL (ref 5–23)
CALCIUM SERPL-MCNC: 9.2 MG/DL (ref 8.5–10.5)
CHLORIDE BLD-SCNC: 107 MMOL/L (ref 98–109)
CHOLESTEROL/HDL RATIO: 3.4 (ref 1–5)
CHOLESTEROL: 141 MG/DL (ref 150–200)
CO2: 26 MMOL/L (ref 22–32)
CREAT SERPL-MCNC: 0.6 MG/DL (ref 0.4–1)
EGFR AFRICAN AMERICAN: >60 ML/MIN/1.73SQ.M
EGFR IF NONAFRICAN AMERICAN: >60 ML/MIN/1.73SQ.M
EOSINOPHILS RELATIVE PERCENT: 1.7 %
GLUCOSE: 72 MG/DL (ref 65–99)
HBA1C MFR BLD: 5.5 % (ref 4.4–6.4)
HCT VFR BLD CALC: 37.2 % (ref 35–47)
HDLC SERPL-MCNC: 42 MG/DL
HEMOGLOBIN: 11.8 G/DL (ref 11.7–15.5)
LDL CHOLESTEROL CALCULATED: 76 MG/DL
LDL/HDL RATIO: 1.8
LYMPHOCYTE %: 21.3 %
MCH RBC QN AUTO: 28.3 PG (ref 27–34)
MCHC RBC AUTO-ENTMCNC: 31.8 G/DL (ref 32–36)
MCV RBC AUTO: 89 FL (ref 80–100)
MONOCYTES # BLD: 3 %
NEUTROPHILS RELATIVE PERCENT: 73.3 %
PDW BLD-RTO: 18.6 % (ref 11.5–15)
PLATELETS: 439 X10E9/L (ref 150–450)
PMV BLD AUTO: 8.7 FL (ref 7–12)
POTASSIUM SERPL-SCNC: 3.6 MMOL/L (ref 3.5–5)
RBC: 4.18 X10E12/L (ref 3.8–5.2)
SODIUM BLD-SCNC: 143 MMOL/L (ref 134–146)
TOTAL PROTEIN: 7.8 G/DL (ref 6–8)
TRIGL SERPL-MCNC: 115 MG/DL (ref 27–150)
TSH SERPL DL<=0.05 MIU/L-ACNC: 2.65 UIU/ML (ref 0.49–4.67)
VLDLC SERPL CALC-MCNC: 23 MG/DL (ref 0–30)
WBC: 7.2 X10E9/L (ref 4–11)

## 2020-10-08 LAB
AVERAGE GLUCOSE: 111 MG/DL
HBA1C MFR BLD: 5.5 % (ref 4.4–6.4)

## 2020-12-18 ENCOUNTER — OFFICE VISIT (OUTPATIENT)
Dept: FAMILY MEDICINE CLINIC | Age: 55
End: 2020-12-18
Payer: COMMERCIAL

## 2020-12-18 VITALS
BODY MASS INDEX: 27.6 KG/M2 | HEART RATE: 80 BPM | RESPIRATION RATE: 16 BRPM | SYSTOLIC BLOOD PRESSURE: 156 MMHG | WEIGHT: 176.2 LBS | DIASTOLIC BLOOD PRESSURE: 82 MMHG | TEMPERATURE: 96.4 F

## 2020-12-18 PROCEDURE — G8484 FLU IMMUNIZE NO ADMIN: HCPCS | Performed by: NURSE PRACTITIONER

## 2020-12-18 PROCEDURE — 1036F TOBACCO NON-USER: CPT | Performed by: NURSE PRACTITIONER

## 2020-12-18 PROCEDURE — 3017F COLORECTAL CA SCREEN DOC REV: CPT | Performed by: NURSE PRACTITIONER

## 2020-12-18 PROCEDURE — G8427 DOCREV CUR MEDS BY ELIG CLIN: HCPCS | Performed by: NURSE PRACTITIONER

## 2020-12-18 PROCEDURE — 99213 OFFICE O/P EST LOW 20 MIN: CPT | Performed by: NURSE PRACTITIONER

## 2020-12-18 PROCEDURE — G8417 CALC BMI ABV UP PARAM F/U: HCPCS | Performed by: NURSE PRACTITIONER

## 2020-12-18 RX ORDER — CYCLOBENZAPRINE HCL 10 MG
10 TABLET ORAL 3 TIMES DAILY PRN
Qty: 30 TABLET | Refills: 0 | Status: SHIPPED | OUTPATIENT
Start: 2020-12-18 | End: 2022-02-08

## 2020-12-18 RX ORDER — ATORVASTATIN CALCIUM 20 MG/1
TABLET, FILM COATED ORAL
Qty: 90 TABLET | Refills: 3 | Status: SHIPPED | OUTPATIENT
Start: 2020-12-18 | End: 2022-02-07 | Stop reason: SDUPTHER

## 2020-12-18 RX ORDER — TRAMADOL HYDROCHLORIDE 50 MG/1
50 TABLET ORAL EVERY 8 HOURS PRN
Qty: 30 TABLET | Refills: 1 | Status: SHIPPED | OUTPATIENT
Start: 2020-12-18 | End: 2021-01-17

## 2020-12-18 ASSESSMENT — ENCOUNTER SYMPTOMS
COUGH: 0
ABDOMINAL PAIN: 0
BACK PAIN: 1
NAUSEA: 0

## 2020-12-18 NOTE — PROGRESS NOTES
Subjective:      Patient ID: Tessie Dupont is a 60 y.o. female. HPI  : Discuss Medications    Chief Complaint   Patient presents with    Discuss Medications    Medication Refill       PRN use of Flexeril and Tramadol. Minimal use. OTC NSAID she uses prior. Tramadol for btp. Unknown who was last prescriber which shows unknown last rx    BP Readings from Last 3 Encounters:   12/18/20 (!) 156/82   08/27/20 118/68   08/21/20 112/66       Hx of BP issues. Since lumabr surgery in February 2020 BP was wnl without medication. BP medication was taken off in hospital.   Has ability to check BP at home. Patient Active Problem List   Diagnosis    GERD (gastroesophageal reflux disease)    Chronic use of steroids    Tobacco abuse    Rheumatoid arthritis ([address]    Anemia    Lumbar nerve root impingement    Cauda equina syndrome ([address]    Immunosuppressed status ([address]     COLONOSCOPY - 2013  MAMMO - 2019  PAP - 2018    Dr. Jeremy Arroyo - RHEUM    Chronic Steroid Therapy and Immunosuppressant for RA. S/p lumbar fusion with caudia equina in February 2020. Doing well from that. Activity back to normal.  No restrictions. Completed follow up with NEUROSURGEON. BP wnl. Was previous on BP medication - no longer.      BP Readings from Last 3 Encounters:   12/18/20 (!) 156/82   08/27/20 118/68   08/21/20 112/66       Due for annual labs    Lab Results   Component Value Date    LABA1C 5.5 10/08/2020    LABA1C 5.5 08/31/2020    LABA1C 6.2 07/22/2019     No results found for: EAG    No components found for: CHLPL  Lab Results   Component Value Date    TRIG 115 08/31/2020    TRIG 141 07/22/2019    TRIG 186 (H) 06/12/2018     Lab Results   Component Value Date    HDL 42 08/31/2020    HDL 41 07/22/2019    HDL 48 06/12/2018     Lab Results   Component Value Date    LDLCALC 76 08/31/2020    LDLCALC 84 07/22/2019    LDLCALC 82 06/12/2018     Lab Results   Component Value Date    LABVLDL 23 08/31/2020 Musculoskeletal: Positive for arthralgias and back pain. Skin: Negative for rash. Neurological: Negative for dizziness and light-headedness. Psychiatric/Behavioral: Negative. Objective:   Physical Exam  Constitutional:       General: She is not in acute distress. Appearance: She is well-developed. HENT:      Right Ear: Tympanic membrane normal.      Left Ear: Tympanic membrane normal.      Nose: Nose normal.   Cardiovascular:      Rate and Rhythm: Normal rate and regular rhythm. Pulses: Normal pulses. Heart sounds: Normal heart sounds, S1 normal and S2 normal. No murmur. No S3 sounds. Pulmonary:      Effort: Pulmonary effort is normal.      Breath sounds: Normal breath sounds. No decreased breath sounds, wheezing or rhonchi. Abdominal:      General: Bowel sounds are normal.      Palpations: Abdomen is soft. Tenderness: There is no abdominal tenderness. Neurological:      Mental Status: She is alert and oriented to person, place, and time. Cranial Nerves: No cranial nerve deficit. Sensory: No sensory deficit. Coordination: Coordination normal.      Gait: Gait normal.   Psychiatric:         Behavior: Behavior normal.         Assessment:       Diagnosis Orders   1. Rheumatoid arthritis, involving unspecified site, unspecified whether rheumatoid factor present (HCC)  traMADol (ULTRAM) 50 MG tablet    cyclobenzaprine (FLEXERIL) 10 MG tablet   2. Mixed hyperlipidemia  atorvastatin (LIPITOR) 20 MG tablet   3.  Elevated BP without diagnosis of hypertension             Plan:      PRN use Tramadol + Flexeril btp  Continue OTC tx  NOn-pharm tx discussed  Monitor BP at home  Give update on BP in 2 weeks

## 2020-12-18 NOTE — PROGRESS NOTES
Visit Information    Have you changed or started any medications since your last visit including any over-the-counter medicines, vitamins, or herbal medicines? yes - see list   Are you having any side effects from any of your medications? -  no  Have you stopped taking any of your medications? Is so, why? -  no    Have you seen any other physician or provider since your last visit? Yes - Records Obtained  Have you had any other diagnostic tests since your last visit? Yes - Records Obtained  Have you been seen in the emergency room and/or had an admission to a hospital since we last saw you? No  Have you had your routine dental cleaning in the past 6 months? yes - last month    Have you activated your [employer] account? If not, what are your barriers?  Yes     Patient Care Team:  FABIAN Reeder CNP as PCP - General (Nurse Practitioner)  FABIAN Reeder CNP as PCP - [facility] Provider    Medical History Review  Past Medical, Family, and Social History reviewed and does contribute to the patient presenting condition    Health Maintenance   Topic Date Due    Shingles Vaccine (1 of 2) 06/14/2013    Pneumococcal 0-64 years Vaccine (2 of 3 - PCV13) 08/01/2014    Cervical cancer screen  06/01/2016    Breast cancer screen  06/18/2021    Lipid screen  08/31/2021    Colon cancer screen colonoscopy  01/23/2024    DTaP/Tdap/Td vaccine (2 - Td) 07/10/2029    Flu vaccine  Completed    Hepatitis C screen  Completed    HIV screen  Completed    Hepatitis A vaccine  Aged Out    Hepatitis B vaccine  Aged Out    Hib vaccine  Aged Out    Meningococcal (ACWY) vaccine  Aged Out

## 2021-01-15 DIAGNOSIS — G83.4 CAUDA EQUINA SYNDROME (HCC): ICD-10-CM

## 2021-01-15 DIAGNOSIS — M48.00 CENTRAL STENOSIS OF SPINAL CANAL: Primary | ICD-10-CM

## 2021-01-15 DIAGNOSIS — M54.16 LUMBAR NERVE ROOT IMPINGEMENT: ICD-10-CM

## 2021-02-07 ENCOUNTER — HOSPITAL ENCOUNTER (EMERGENCY)
Age: 56
Discharge: HOME | End: 2021-02-07
Payer: COMMERCIAL

## 2021-02-07 ENCOUNTER — APPOINTMENT (OUTPATIENT)
Dept: GENERAL RADIOLOGY | Age: 56
End: 2021-02-07
Payer: COMMERCIAL

## 2021-02-07 VITALS
HEART RATE: 100 BPM | SYSTOLIC BLOOD PRESSURE: 157 MMHG | RESPIRATION RATE: 16 BRPM | OXYGEN SATURATION: 96 % | TEMPERATURE: 98.8 F | DIASTOLIC BLOOD PRESSURE: 83 MMHG

## 2021-02-07 DIAGNOSIS — Z87.39 HISTORY OF SPINAL STENOSIS: ICD-10-CM

## 2021-02-07 DIAGNOSIS — S39.012A STRAIN OF LUMBAR REGION, INITIAL ENCOUNTER: Primary | ICD-10-CM

## 2021-02-07 PROCEDURE — 99282 EMERGENCY DEPT VISIT SF MDM: CPT

## 2021-02-07 PROCEDURE — 72100 X-RAY EXAM L-S SPINE 2/3 VWS: CPT

## 2021-02-07 RX ORDER — LIDOCAINE 4 G/G
1 PATCH TOPICAL DAILY
Status: DISCONTINUED | OUTPATIENT
Start: 2021-02-07 | End: 2021-02-07 | Stop reason: HOSPADM

## 2021-02-07 ASSESSMENT — ENCOUNTER SYMPTOMS
NAUSEA: 0
SHORTNESS OF BREATH: 0
COUGH: 0
BACK PAIN: 1
VOMITING: 0
ABDOMINAL PAIN: 0
COLOR CHANGE: 0

## 2021-02-07 ASSESSMENT — PAIN DESCRIPTION - PAIN TYPE: TYPE: ACUTE PAIN

## 2021-02-07 ASSESSMENT — PAIN SCALES - GENERAL: PAINLEVEL_OUTOF10: 5

## 2021-02-07 NOTE — ED PROVIDER NOTES
[facility] Emergency Department    CHIEF COMPLAINT       Chief Complaint   Patient presents with    Back Pain       Nurses Notes reviewed and I agree except as noted in the HPI. HISTORY OF PRESENT ILLNESS    David Lui yeison 60 y.o. female who presents to the ED for evaluation of back pain. Patient has a history of lumbar fusion with her most recent surgery in February 2020. She reports that the week of January 6, 2021 she injured her back after she dropped a mask on the floor and slipped on it, causing her leg to slide out in front of her. She denies any direct trauma to her back. She states that pain in her lumbar and thoracic regions is constant but tolerable most of the time, with severity increasing intermittently. At its worst, pain is sharp and radiates up the left side of her back. She has tried heating pads, cyclobenzaprine, and tramadol, which she reports \"makes the pain tolerable. \" She rates her current pain 5/10. She denies any numbness, shooting pain, gait abnormalities, or headache. Patient denies fevers chills, urinary or bowel incontinence, history of cancer, unilateral/bilateral weakness. HPI was provided by the patient. REVIEW OF SYSTEMS     Review of Systems   Constitutional: Negative for activity change, chills and diaphoresis. Respiratory: Negative for cough and shortness of breath. Cardiovascular: Negative for chest pain and leg swelling. Gastrointestinal: Negative for abdominal pain, nausea and vomiting. Genitourinary: Negative for difficulty urinating and flank pain. Musculoskeletal: Positive for back pain. Negative for arthralgias, gait problem, joint swelling, neck pain and neck stiffness. Skin: Negative for color change, pallor and rash. Neurological: Negative for dizziness, tremors, weakness, light-headedness, numbness and headaches. Psychiatric/Behavioral: Negative for agitation, behavioral problems and confusion.         PAST MEDICAL HISTORY Past Medical History:   Diagnosis Date    Arthritis pain     Blood circulation, collateral     both arms--related to RA    Difficulty in swallowing 2013    DVT (deep venous thrombosis) (HCC)     Esophageal dysmotility 5/10/2013    Essential hypertension 4/23/2018    GERD (gastroesophageal reflux disease)     Hemorrhoids     Osteoarthritis     Pneumonia     Rheumatoid arthritis ([facility]     Tobacco abuse 4/20/2013       SURGICALHISTORY      has a past surgical history that includes Tubal ligation; Ectopic pregnancy surgery; Wrist fusion; Wrist surgery; back surgery (1999 ?); Endometrial ablation; other surgical history (Right, 05/29/2013); and lumbar fusion (N/A, 2/13/2020).     CURRENT MEDICATIONS       Discharge Medication List as of 2/7/2021  1:38 PM      CONTINUE these medications which have NOT CHANGED    Details   BIOTIN PO Take by mouthHistorical Med      Ascorbic Acid (SANTOS-C PO) Take by mouthHistorical Med      atorvastatin (LIPITOR) 20 MG tablet TAKE 1 TABLET DAILY, Disp-90 tablet, R-3Normal      cyclobenzaprine (FLEXERIL) 10 MG tablet Take 1 tablet by mouth 3 times daily as needed for Muscle spasms, Disp-30 tablet, R-0Normal      ferrous sulfate (IRON 325) 325 (65 Fe) MG tablet Take 650 mg by mouth daily (with breakfast)Historical Med      folic acid (FOLVITE) 1 MG tablet TAKE 2 TABLETS BY MOUTH EVERY DAYHistorical Med      predniSONE (DELTASONE) 1 MG tablet Take 10 tablets by mouth daily, Disp-30 [id number] PRINT      Certolizumab Pegol (CIMZIA SC) Inject into the skin every 28 daysHistorical Med      metFORMIN (GLUCOPHAGE) 850 MG tablet TAKE 1 TABLET DAILY WITH BREAKFAST, Disp-90 tablet,R-3Normal      docusate sodium (COLACE, DULCOLAX) 100 MG CAPS Take 200 mg by mouth daily, Disp-20 capsule, R-0Print      diclofenac sodium 1 % GEL Apply 2 g topically 4 times daily as needed for Pain Apply to right side neck., Topical, 4 TIMES DAILY PRN Starting Mon 1/6/2020, Disp-200 g, R-1, Normal Naproxen Sodium (ALEVE PO) Take by mouthHistorical Med      Calcium Carb-Cholecalciferol (CALCIUM 600+D) 600-800 MG-UNIT TABS Historical Med      methotrexate (RHEUMATREX) 2.5 MG chemo tablet Take 17.5 mg by mouth every 7 days Historical Med      omeprazole (PRILOSEC) 20 MG capsule Take 20 mg by mouth every other day Indications: Nonerosive GERD Historical Med      alendronate (FOSAMAX) 70 MG tablet Take 70 mg by mouth every 7 days. Indications: Osteoporosis             ALLERGIES     is allergic to remicade [infliximab] and arava [leflunomide]. FAMILY HISTORY     She indicated that her mother is . She indicated that her father is . She indicated that the status of her sister is unknown. She indicated that the status of her brother is unknown. She indicated that the status of her maternal aunt is unknown. She indicated that the status of her maternal uncle is unknown.   family history includes Arthritis in her maternal uncle, mother, and sister; Cancer in her father; Diabetes in her maternal aunt; Heart Disease in her mother; High Blood Pressure in her brother and father. SOCIAL HISTORY       Social History     Socioeconomic History    Marital status:      Spouse name: Not on file    Number of children: Not on file    Years of education: Not on file    Highest education level: Not on file   Occupational History    Not on file   Social Needs    Financial resource strain: Not hard at all    Food insecurity     Worry: Never true     Inability: Never true   [employer] needs     Medical: No     Non-medical: No   Tobacco Use    Smoking status: Former Smoker     Packs/day: 0.50     Years: 15.00     Pack years: 7.50     Types: Cigarettes     Quit date: 2013     Years since quittin.7    Smokeless tobacco: Never Used    Tobacco comment: restarted  smokes 1-2 cigs daily using patches   Substance and Sexual Activity    Alcohol use: Yes     Comment: social    Drug use:  No Lumbar back: She exhibits tenderness (Left paraspinal muscular tenderness). She exhibits no bony tenderness, no swelling, no edema and no deformity. Skin:     General: Skin is warm and dry. Coloration: Skin is not pale. Findings: No bruising, erythema or rash. Neurological:      General: No focal deficit present. Mental Status: She is alert. Sensory: No sensory deficit. Motor: No weakness. Gait: Gait normal.   Psychiatric:         Mood and Affect: Mood normal.         Behavior: Behavior normal.         Thought Content: Thought content normal.         DIFFERENTIAL DIAGNOSIS:   Muscle strain, hardware displacement, degenerative disc disease, compression fracture,    DIAGNOSTIC RESULTS       RADIOLOGY: non-plainfilm images(s) such as CT, Ultrasound and MRI are read by the radiologist.  Plain radiographic images are visualized and preliminarily interpreted by the emergency physician unless otherwise stated below. XR LUMBAR SPINE (2-3 VIEWS)   Final Result   1. Prior posterior lumbar fusion with metallic pedicle screws and rods posteriorly extending from L3 to S1. Disc spacer devices are present at the L4-5 and L5-S1 levels. Note that the disc visualized at L5-S1 level is relatively anterior position. Moderately severe disc space narrowing lower 3 lumbar levels. 2. Slight retrolisthesis L2 upon L3, 3 mm. Mild vertebral body spondylosis scattered in the lumbar spine. 3. No fracture is seen. Mild degenerative changes both sacroiliac joints. .   4. Overall appearance of lumbar spine has worsened since prior study. **This report has been created using voice recognition software. It may contain minor errors which are inherent in voice recognition technology. **      Final report electronically signed by Dr. Linda Frazier on 2/7/2021 1:23 PM            LABS:   Labs Reviewed - No data to display    EMERGENCY DEPARTMENT COURSE:   Vitals:    Vitals:    02/07/21 1219 I personally performed the services described in the documentation,reviewed and edited the documentation which was dictated to the scribe in my presence, and it accurately records my words and actions.     Luis Garcia CNP 02/07/21 3:22 PM    FABIAN Lee - JI        [employer], FABIAN - CNP  02/07/21 [id number]

## 2021-02-08 ENCOUNTER — TELEPHONE (OUTPATIENT)
Dept: FAMILY MEDICINE CLINIC | Age: 56
End: 2021-02-08

## 2021-03-24 ENCOUNTER — IMMUNIZATION (OUTPATIENT)
Dept: PRIMARY CARE CLINIC | Age: 56
End: 2021-03-24
Payer: COMMERCIAL

## 2021-03-24 PROCEDURE — 0001A PR IMM ADMN SARSCOV2 30MCG/0.3ML DIL RECON 1ST DOSE: CPT | Performed by: FAMILY MEDICINE

## 2021-03-24 PROCEDURE — 91300 COVID-19, PFIZER VACCINE 30MCG/0.3ML DOSE: CPT | Performed by: FAMILY MEDICINE

## 2021-04-14 ENCOUNTER — IMMUNIZATION (OUTPATIENT)
Dept: PRIMARY CARE CLINIC | Age: 56
End: 2021-04-14
Payer: COMMERCIAL

## 2021-04-14 PROCEDURE — 91300 COVID-19, PFIZER VACCINE 30MCG/0.3ML DOSE: CPT | Performed by: FAMILY MEDICINE

## 2021-04-14 PROCEDURE — 0002A COVID-19, PFIZER VACCINE 30MCG/0.3ML DOSE: CPT | Performed by: FAMILY MEDICINE

## 2021-10-07 DIAGNOSIS — R73.01 IFG (IMPAIRED FASTING GLUCOSE): ICD-10-CM

## 2021-11-12 ENCOUNTER — IMMUNIZATION (OUTPATIENT)
Dept: FAMILY MEDICINE CLINIC | Age: 56
End: 2021-11-12
Payer: COMMERCIAL

## 2021-11-12 PROCEDURE — 91300 COVID-19, PFIZER VACCINE 30MCG/0.3ML DOSE: CPT | Performed by: FAMILY MEDICINE

## 2021-11-12 PROCEDURE — 0004A COVID-19, PFIZER VACCINE 30MCG/0.3ML DOSE: CPT | Performed by: FAMILY MEDICINE

## 2022-02-07 DIAGNOSIS — R73.01 IFG (IMPAIRED FASTING GLUCOSE): ICD-10-CM

## 2022-02-07 DIAGNOSIS — E78.2 MIXED HYPERLIPIDEMIA: ICD-10-CM

## 2022-02-07 RX ORDER — ATORVASTATIN CALCIUM 20 MG/1
TABLET, FILM COATED ORAL
Qty: 90 TABLET | Refills: 3 | Status: SHIPPED | OUTPATIENT
Start: 2022-02-07

## 2022-02-08 ENCOUNTER — OFFICE VISIT (OUTPATIENT)
Dept: FAMILY MEDICINE CLINIC | Age: 57
End: 2022-02-08
Payer: COMMERCIAL

## 2022-02-08 VITALS
SYSTOLIC BLOOD PRESSURE: 132 MMHG | BODY MASS INDEX: 31.23 KG/M2 | DIASTOLIC BLOOD PRESSURE: 76 MMHG | WEIGHT: 199 LBS | HEIGHT: 67 IN | RESPIRATION RATE: 12 BRPM | HEART RATE: 72 BPM

## 2022-02-08 DIAGNOSIS — Z00.00 WELL ADULT EXAM: Primary | ICD-10-CM

## 2022-02-08 DIAGNOSIS — M06.9 RHEUMATOID ARTHRITIS, INVOLVING UNSPECIFIED SITE, UNSPECIFIED WHETHER RHEUMATOID FACTOR PRESENT (HCC): ICD-10-CM

## 2022-02-08 DIAGNOSIS — Z12.31 ENCOUNTER FOR SCREENING MAMMOGRAM FOR BREAST CANCER: ICD-10-CM

## 2022-02-08 DIAGNOSIS — E78.2 MIXED HYPERLIPIDEMIA: ICD-10-CM

## 2022-02-08 DIAGNOSIS — R73.01 IFG (IMPAIRED FASTING GLUCOSE): ICD-10-CM

## 2022-02-08 PROCEDURE — 99396 PREV VISIT EST AGE 40-64: CPT | Performed by: NURSE PRACTITIONER

## 2022-02-08 PROCEDURE — G8482 FLU IMMUNIZE ORDER/ADMIN: HCPCS | Performed by: NURSE PRACTITIONER

## 2022-02-08 RX ORDER — IBUPROFEN 800 MG/1
800 TABLET ORAL EVERY 8 HOURS PRN
Qty: 60 TABLET | Refills: 2 | Status: SHIPPED | OUTPATIENT
Start: 2022-02-08

## 2022-02-08 SDOH — ECONOMIC STABILITY: FOOD INSECURITY: WITHIN THE PAST 12 MONTHS, THE FOOD YOU BOUGHT JUST DIDN'T LAST AND YOU DIDN'T HAVE MONEY TO GET MORE.: NEVER TRUE

## 2022-02-08 SDOH — ECONOMIC STABILITY: FOOD INSECURITY: WITHIN THE PAST 12 MONTHS, YOU WORRIED THAT YOUR FOOD WOULD RUN OUT BEFORE YOU GOT MONEY TO BUY MORE.: NEVER TRUE

## 2022-02-08 ASSESSMENT — ENCOUNTER SYMPTOMS
COUGH: 0
BACK PAIN: 1
ABDOMINAL PAIN: 0
NAUSEA: 0

## 2022-02-08 ASSESSMENT — PATIENT HEALTH QUESTIONNAIRE - PHQ9
1. LITTLE INTEREST OR PLEASURE IN DOING THINGS: 0
SUM OF ALL RESPONSES TO PHQ QUESTIONS 1-9: 0
2. FEELING DOWN, DEPRESSED OR HOPELESS: 0
SUM OF ALL RESPONSES TO PHQ9 QUESTIONS 1 & 2: 0

## 2022-02-08 ASSESSMENT — SOCIAL DETERMINANTS OF HEALTH (SDOH): HOW HARD IS IT FOR YOU TO PAY FOR THE VERY BASICS LIKE FOOD, HOUSING, MEDICAL CARE, AND HEATING?: NOT HARD AT ALL

## 2022-02-08 NOTE — PROGRESS NOTES
Subjective:      Patient ID: Chivo Dyer is a 60 y.o. female. HPI: Discuss Medications    Chief Complaint   Patient presents with    Annual Exam       Patient Active Problem List   Diagnosis    GERD (gastroesophageal reflux disease)    Chronic use of steroids    Tobacco abuse    [facility]oid arthritis ([facility]    Anemia    Lumbar nerve root impingement    Cauda equina syndrome ([facility]    Immunosuppressed status ([facility]       COLONOSCOPY - 2013  MAMMO - 2019  PAP - 2018    Dr. Galo Mao - RHEUM    Chronic Steroid Therapy and Immunosuppressant for RA. S/p lumbar fusion with caudia equina in February 2020. Doing well from that. Activity back to normal.  No restrictions. Completed follow up with NEUROSURGEON. Wt Readings from Last 3 Encounters:   02/08/22 199 lb (90.3 kg)   12/18/20 176 lb 3.2 oz (79.9 kg)   08/27/20 167 lb (75.8 kg)       BP wnl. Was previous on BP medication - no longer. BP Readings from Last 3 Encounters:   02/08/22 132/76   02/07/21 (!) 157/83   12/18/20 (!) 156/82       Due for annual labs. On Lipitor 20 mg. On Metformin 850 mg Daily.      Lab Results   Component Value Date    LABA1C 5.5 10/08/2020    LABA1C 5.5 08/31/2020    LABA1C 6.2 07/22/2019     No results found for: EAG    No components found for: CHLPL  Lab Results   Component Value Date    TRIG 115 08/31/2020    TRIG 141 07/22/2019    TRIG 186 (H) 06/12/2018     Lab Results   Component Value Date    HDL 42 08/31/2020    HDL 41 07/22/2019    HDL 48 06/12/2018     Lab Results   Component Value Date    LDLCALC 76 08/31/2020    LDLCALC 84 07/22/2019    LDLCALC 82 06/12/2018     Lab Results   Component Value Date    LABVLDL 23 08/31/2020    LABVLDL 28 07/22/2019    LABVLDL 37 (H) 06/12/2018         Chemistry        Component Value Date/Time     08/31/2020 0900    K 3.6 08/31/2020 0900     08/31/2020 0900    CO2 26 08/31/2020 0900    BUN 6 08/31/2020 0900    CREATININE 0.60 08/31/2020 0900        Component Value Date/Time    CALCIUM 9.2 08/31/2020 0900    ALKPHOS 74 08/31/2020 0900    ALKPHOS 75 02/11/2020 1925    AST 22 08/31/2020 0900    ALT 23 08/31/2020 0900    BILITOT 0.4 08/31/2020 0900            Lab Results   Component Value Date    TSH 2.65 08/31/2020       Lab Results   Component Value Date    WBC 7.2 08/31/2020    HGB 11.8 08/31/2020    HCT 37.2 08/31/2020    MCV 89 08/31/2020     08/31/2020         Health Maintenance   Topic Date Due    Depression Screen  Never done    Cervical cancer screen  Never done    Shingles Vaccine (1 of 2) Never done    Pneumococcal 0-64 years Vaccine (2 of 4 - PCV13) 08/01/2014    Breast cancer screen  06/18/2021    Lipid screen  08/31/2021    COVID-19 Vaccine (4 - Booster for Pfizer series) 04/12/2022    Colon cancer screen colonoscopy  01/23/2024    DTaP/Tdap/Td vaccine (2 - Td or Tdap) 07/10/2029    Flu vaccine  Completed    Hepatitis C screen  Completed    HIV screen  Completed    Hepatitis A vaccine  Aged Out    Hepatitis B vaccine  Aged Out    Hib vaccine  Aged Out    Meningococcal (ACWY) vaccine  Aged Out       Immunization History   Administered Date(s) Administered    COVID-19, Pfizer Purple top, DILUTE for use, 12+ yrs, 30mcg/0.3mL dose 03/24/2021, 04/14/2021, 11/12/2021    FLUARIX 3 YEARS AND OVER 11/02/2015    Influenza Virus Vaccine 10/01/2013, 09/22/2014, 09/16/2020    Influenza, Quadv, IM, PF (6 mo and older Fluzone, Flulaval, Fluarix, and 3 yrs and older Afluria) 09/13/2016, 09/11/2018, 09/17/2019, 10/07/2021    Pneumococcal Polysaccharide ([id number]) 08/01/2013    Tdap (Boostrix, Adacel) 07/10/2019       Review of Systems   Constitutional: Negative for chills, fatigue and fever. HENT: Negative. Eyes: Negative for visual disturbance. Respiratory: Negative for cough. Gastrointestinal: Negative for abdominal pain and nausea. Musculoskeletal: Positive for arthralgias and back pain. Skin: Negative for rash.    Neurological: Negative for dizziness and light-headedness. Psychiatric/Behavioral: Negative. Objective:   Physical Exam  Constitutional:       General: She is not in acute distress. Appearance: She is well-developed. HENT:      Right Ear: Tympanic membrane normal.      Left Ear: Tympanic membrane normal.      Nose: Nose normal.   Cardiovascular:      Rate and Rhythm: Normal rate and regular rhythm. Pulses: Normal pulses. Heart sounds: Normal heart sounds, S1 normal and S2 normal. No murmur heard. No S3 sounds. Pulmonary:      Effort: Pulmonary effort is normal.      Breath sounds: Normal breath sounds. No decreased breath sounds, wheezing or rhonchi. Abdominal:      General: Bowel sounds are normal.      Palpations: Abdomen is soft. Tenderness: There is no abdominal tenderness. Neurological:      Mental Status: She is alert and oriented to person, place, and time. Cranial Nerves: No cranial nerve deficit. Sensory: No sensory deficit. Coordination: Coordination normal.      Gait: Gait normal.   Psychiatric:         Behavior: Behavior normal.         Assessment:       Diagnosis Orders   1. Well adult exam  CBC    Lipid Panel    Comprehensive Metabolic Panel    Hemoglobin A1C    TSH with Reflex   2. Rheumatoid arthritis, involving unspecified site, unspecified whether rheumatoid factor present (HCC)  ibuprofen (IBU) 800 MG tablet   3. Mixed hyperlipidemia     4. IFG (impaired fasting glucose)     5.  Encounter for screening mammogram for breast cancer  [facility]ES[facility] Digital Screen Bilateral [[id number]]           Plan:      Chronic conditions stable  Labs as above  Refills as above  Follow up with Specialists  MAMMO ordered  Immunizations discussed  Healthy Lifestyles discussed  RTO in 1 year

## 2022-02-08 NOTE — PATIENT INSTRUCTIONS
Patient Education        Learning About Low-Carbohydrate Foods  What foods are low in carbohydrate? The foods you eat contain nutrients, such as vitamins and minerals. Carbohydrate is a nutrient. Your body needs the right amount to stay healthy and work as it should. You can use the list below to help you make choices about which foods to eat. Some foods that are lower in carbohydrate include:  Dairy and dairy alternatives  · Cheese  · Cottage cheese  · Cream cheese  · Nut milk (unsweetened)  · Soy milk (unsweetened)  · Yogurt (Greek, plain)  Fruits  · Avocado  · [employer] and other protein foods  · Almonds  · Beef  · Chicken  · Cod  · Eggs  · Halibut  · Peanut butter and other nut butters  · Pistachios  · Pork  · Pumpkin seeds  · Tofu  · Trout  · [place]  · [place] ([facility])  · Walnuts  Vegetables  · Broccoli  · Carrots  · Cauliflower  · Green beans  · Mushrooms  · Peppers  · Salad greens  · Spinach  · Tomatoes  Work with your doctor to find out how much of this nutrient you need. Depending on your health, you may need more or less of it in your diet. Where can you learn more? Go to [email or web address] and sign in to your [employer] account. Enter [phone] in the [facility] box to learn more about \"Learning About Low-Carbohydrate Foods. \"     If you do not have an account, please click on the \"Sign Up Now\" link. Current as of: September 8, 2021               Content Version: 13.1  © 2006-2021 Healthwise, Incorporated. Care instructions adapted under license by [facility] ([facility]). If you have questions about a medical condition or this instruction, always ask your healthcare professional. [place] [address] any warranty or liability for your use of this information.

## 2022-03-08 LAB
ABSOLUTE BASO #: 0.1 X10E9/L (ref 0–0.2)
ABSOLUTE EOS #: 0.1 X10E9/L (ref 0–0.4)
ABSOLUTE LYMPH #: 1.2 X10E9/L (ref 1–3.5)
ABSOLUTE MONO #: 0.3 X10E9/L (ref 0–0.9)
ABSOLUTE NEUT #: 4.6 X10E9/L (ref 1.5–6.6)
ALBUMIN SERPL-MCNC: 4.4 G/DL (ref 3.2–5.3)
ALK PHOSPHATASE: 69 U/L (ref 39–130)
ALT SERPL-CCNC: 26 U/L (ref 0–31)
ANION GAP SERPL CALCULATED.3IONS-SCNC: 10 MMOL/L (ref 5–15)
AST SERPL-CCNC: 23 U/L (ref 0–41)
AVERAGE GLUCOSE: 108 MG/DL
BASOPHILS RELATIVE PERCENT: 1.1 %
BILIRUB SERPL-MCNC: 0.4 MG/DL (ref 0.3–1.2)
BUN BLDV-MCNC: 9 MG/DL (ref 5–23)
CALCIUM SERPL-MCNC: 9.3 MG/DL (ref 8.5–10.5)
CHLORIDE BLD-SCNC: 110 MMOL/L (ref 98–109)
CHOLESTEROL/HDL RATIO: 3 (ref 1–5)
CHOLESTEROL: 141 MG/DL (ref 150–200)
CO2: 25 MMOL/L (ref 22–32)
CREAT SERPL-MCNC: 0.62 MG/DL (ref 0.4–1)
EGFR AFRICAN AMERICAN: >60 ML/MIN/1.73SQ.M
EGFR IF NONAFRICAN AMERICAN: >60 ML/MIN/1.73SQ.M
EOSINOPHILS RELATIVE PERCENT: 2 %
GLUCOSE: 93 MG/DL (ref 65–99)
HBA1C MFR BLD: 5.4 % (ref 4.4–6.4)
HCT VFR BLD CALC: 39 % (ref 35–47)
HDLC SERPL-MCNC: 47 MG/DL
HEMOGLOBIN: 12.6 G/DL (ref 11.7–15.5)
LDL CHOLESTEROL CALCULATED: 77 MG/DL
LDL/HDL RATIO: 1.6
LYMPHOCYTE %: 18.9 %
MCH RBC QN AUTO: 30.8 PG (ref 27–34)
MCHC RBC AUTO-ENTMCNC: 32.3 G/DL (ref 32–36)
MCV RBC AUTO: 95 FL (ref 80–100)
MONOCYTES # BLD: 5.2 %
NEUTROPHILS RELATIVE PERCENT: 72.8 %
PDW BLD-RTO: 15.3 % (ref 11.5–15)
PLATELETS: 295 X10E9/L (ref 150–450)
PMV BLD AUTO: 8.6 FL (ref 7–12)
POTASSIUM SERPL-SCNC: 3.7 MMOL/L (ref 3.5–5)
RBC: 4.09 X10E12/L (ref 3.8–5.2)
SODIUM BLD-SCNC: 145 MMOL/L (ref 134–146)
TOTAL PROTEIN: 7.4 G/DL (ref 6–8)
TRIGL SERPL-MCNC: 86 MG/DL (ref 27–150)
TSH SERPL DL<=0.05 MIU/L-ACNC: 2.31 UIU/ML (ref 0.49–4.67)
VLDLC SERPL CALC-MCNC: 17 MG/DL (ref 0–30)
WBC: 6.3 X10E9/L (ref 4–11)

## 2022-03-16 ENCOUNTER — HOSPITAL ENCOUNTER (OUTPATIENT)
Dept: WOMENS IMAGING | Age: 57
Discharge: HOME | End: 2022-03-16
Payer: COMMERCIAL

## 2022-03-16 DIAGNOSIS — Z12.31 ENCOUNTER FOR SCREENING MAMMOGRAM FOR BREAST CANCER: ICD-10-CM

## 2022-03-16 PROCEDURE — 77063 BREAST TOMOSYNTHESIS BI: CPT

## 2023-01-09 DIAGNOSIS — E78.2 MIXED HYPERLIPIDEMIA: ICD-10-CM

## 2023-01-09 RX ORDER — ATORVASTATIN CALCIUM 20 MG/1
TABLET, FILM COATED ORAL
Qty: 90 TABLET | Refills: 3 | Status: SHIPPED | OUTPATIENT
Start: 2023-01-09

## 2023-02-24 ENCOUNTER — OFFICE VISIT (OUTPATIENT)
Dept: FAMILY MEDICINE CLINIC | Age: 58
End: 2023-02-24

## 2023-02-24 VITALS
HEIGHT: 67 IN | DIASTOLIC BLOOD PRESSURE: 70 MMHG | SYSTOLIC BLOOD PRESSURE: 116 MMHG | RESPIRATION RATE: 12 BRPM | HEART RATE: 68 BPM | BODY MASS INDEX: 30.07 KG/M2 | WEIGHT: 191.6 LBS

## 2023-02-24 DIAGNOSIS — R73.01 IFG (IMPAIRED FASTING GLUCOSE): ICD-10-CM

## 2023-02-24 DIAGNOSIS — M06.9 RHEUMATOID ARTHRITIS, INVOLVING UNSPECIFIED SITE, UNSPECIFIED WHETHER RHEUMATOID FACTOR PRESENT (HCC): ICD-10-CM

## 2023-02-24 DIAGNOSIS — E78.2 MIXED HYPERLIPIDEMIA: ICD-10-CM

## 2023-02-24 DIAGNOSIS — Z00.00 WELL ADULT EXAM: Primary | ICD-10-CM

## 2023-02-24 DIAGNOSIS — D84.9 IMMUNOSUPPRESSED STATUS (HCC): ICD-10-CM

## 2023-02-24 LAB
ALBUMIN SERPL-MCNC: 5 G/DL (ref 3.5–5.2)
ALK PHOSPHATASE: 80 U/L (ref 40–136)
ALT SERPL-CCNC: 31 U/L (ref 5–40)
ANION GAP SERPL CALCULATED.3IONS-SCNC: 11 MEQ/L (ref 7–16)
AST SERPL-CCNC: 23 U/L (ref 9–40)
AVERAGE GLUCOSE: 114 MG/DL
BILIRUB SERPL-MCNC: 0.5 MG/DL
BUN BLDV-MCNC: 8 MG/DL (ref 6–20)
CALCIUM SERPL-MCNC: 10 MG/DL (ref 8.5–10.5)
CHLORIDE BLD-SCNC: 106 MEQ/L (ref 95–107)
CHOLESTEROL/HDL RATIO: 2.9 RATIO
CHOLESTEROL: 152 MG/DL
CO2: 25 MEQ/L (ref 19–31)
CREAT SERPL-MCNC: 0.65 MG/DL (ref 0.6–1.3)
EGFR IF NONAFRICAN AMERICAN: 101 ML/MIN/1.73
GLUCOSE: 103 MG/DL (ref 70–99)
HBA1C MFR BLD: 5.6 % (ref 4.2–5.6)
HDLC SERPL-MCNC: 53 MG/DL
LDL CHOLESTEROL CALCULATED: 71 MG/DL
LDL/HDL RATIO: 1.3 RATIO
POTASSIUM SERPL-SCNC: 4.2 MEQ/L (ref 3.5–5.4)
SODIUM BLD-SCNC: 142 MEQ/L (ref 133–146)
TOTAL PROTEIN: 7.7 G/DL (ref 6.1–8.3)
TRIGL SERPL-MCNC: 139 MG/DL
TSH SERPL DL<=0.05 MIU/L-ACNC: 2.17 UIU/ML (ref 0.4–4.1)
VLDLC SERPL CALC-MCNC: 28 MG/DL

## 2023-02-24 RX ORDER — ATORVASTATIN CALCIUM 20 MG/1
TABLET, FILM COATED ORAL
Qty: 90 TABLET | Refills: 3 | Status: SHIPPED | OUTPATIENT
Start: 2023-02-24

## 2023-02-24 SDOH — ECONOMIC STABILITY: INCOME INSECURITY: HOW HARD IS IT FOR YOU TO PAY FOR THE VERY BASICS LIKE FOOD, HOUSING, MEDICAL CARE, AND HEATING?: NOT HARD AT ALL

## 2023-02-24 SDOH — ECONOMIC STABILITY: FOOD INSECURITY: WITHIN THE PAST 12 MONTHS, THE FOOD YOU BOUGHT JUST DIDN'T LAST AND YOU DIDN'T HAVE MONEY TO GET MORE.: NEVER TRUE

## 2023-02-24 SDOH — ECONOMIC STABILITY: HOUSING INSECURITY
IN THE LAST 12 MONTHS, WAS THERE A TIME WHEN YOU DID NOT HAVE A STEADY PLACE TO SLEEP OR SLEPT IN A SHELTER (INCLUDING NOW)?: NO

## 2023-02-24 SDOH — ECONOMIC STABILITY: FOOD INSECURITY: WITHIN THE PAST 12 MONTHS, YOU WORRIED THAT YOUR FOOD WOULD RUN OUT BEFORE YOU GOT MONEY TO BUY MORE.: NEVER TRUE

## 2023-02-24 ASSESSMENT — PATIENT HEALTH QUESTIONNAIRE - PHQ9
2. FEELING DOWN, DEPRESSED OR HOPELESS: 0
SUM OF ALL RESPONSES TO PHQ QUESTIONS 1-9: 0
SUM OF ALL RESPONSES TO PHQ9 QUESTIONS 1 & 2: 0
SUM OF ALL RESPONSES TO PHQ QUESTIONS 1-9: 0
1. LITTLE INTEREST OR PLEASURE IN DOING THINGS: 0
SUM OF ALL RESPONSES TO PHQ QUESTIONS 1-9: 0
SUM OF ALL RESPONSES TO PHQ QUESTIONS 1-9: 0

## 2023-02-24 ASSESSMENT — ENCOUNTER SYMPTOMS
BACK PAIN: 1
COUGH: 0
ABDOMINAL PAIN: 0
NAUSEA: 0

## 2023-02-24 NOTE — PROGRESS NOTES
Subjective:      Patient ID: Malia Pennington is a 57 y.o. female.    HPI: Discuss Medications    Chief Complaint   Patient presents with    Annual Exam    Medication Refill       Patient Active Problem List   Diagnosis    GERD (gastroesophageal reflux disease)    Chronic use of steroids    Tobacco abuse    Rheumatoid arthritis (HCC)    Anemia    Lumbar nerve root impingement    Cauda equina syndrome (HCC)    Immunosuppressed status (HCC)       COLONOSCOPY - 2014  MAMMO - 2022    Dr. Joyner - RHEUM    Chronic Steroid Therapy and Immunosuppressant for RA.     S/p lumbar fusion with caudia equina in February 2020.  Doing well from that.  Activity back to normal.  No restrictions.  Completed follow up with NEUROSURGEON.     Wt Readings from Last 3 Encounters:   02/24/23 191 lb 9.6 oz (86.9 kg)   02/08/22 199 lb (90.3 kg)   12/18/20 176 lb 3.2 oz (79.9 kg)       BP wnl.  Was previous on BP medication - no longer.     BP Readings from Last 3 Encounters:   02/24/23 116/70   02/08/22 132/76   02/07/21 (!) 157/83       Due for annual labs.  On Lipitor 20 mg.   On Metformin 850 mg Daily.     Lab Results   Component Value Date    LABA1C 5.6 02/24/2023    LABA1C 5.4 03/08/2022    LABA1C 5.5 10/08/2020     No results found for: EAG    No components found for: CHLPL  Lab Results   Component Value Date    TRIG 139 02/24/2023    TRIG 86 03/08/2022    TRIG 115 08/31/2020     Lab Results   Component Value Date    HDL 53 02/24/2023    HDL 47 03/08/2022    HDL 42 08/31/2020     Lab Results   Component Value Date    LDLCALC 71 02/24/2023    LDLCALC 77 03/08/2022    LDLCALC 76 08/31/2020     Lab Results   Component Value Date    LABVLDL 28 02/24/2023    LABVLDL 17 03/08/2022    LABVLDL 23 08/31/2020         Chemistry        Component Value Date/Time     02/24/2023 0920    K 4.2 02/24/2023 0920     02/24/2023 0920    CO2 25 02/24/2023 0920    BUN 8 02/24/2023 0920    CREATININE 0.65 02/24/2023 0920        Component Value  Date/Time    CALCIUM 10.0 02/24/2023 0920    ALKPHOS 80 02/24/2023 0920    ALKPHOS 75 02/11/2020 1925    AST 23 02/24/2023 0920    ALT 31 02/24/2023 0920    BILITOT 0.5 02/24/2023 0920            Lab Results   Component Value Date    TSH 2.17 02/24/2023       Lab Results   Component Value Date    WBC 6.3 03/08/2022    HGB 12.6 03/08/2022    HCT 39.0 03/08/2022    MCV 95 03/08/2022     03/08/2022         Health Maintenance   Topic Date Due    Shingles vaccine (1 of 2) Never done    Cervical cancer screen  Never done    COVID-19 Vaccine (5 - Booster for Pfizer series) 08/05/2022    Colorectal Cancer Screen  01/23/2024    Lipids  02/24/2024    Depression Screen  02/24/2024    Breast cancer screen  03/16/2024    Diabetes screen  02/24/2026    DTaP/Tdap/Td vaccine (2 - Td or Tdap) 07/10/2029    Flu vaccine  Completed    Pneumococcal 0-64 years Vaccine  Completed    Hepatitis C screen  Completed    HIV screen  Completed    Hepatitis A vaccine  Aged Out    Hib vaccine  Aged Out    Meningococcal (ACWY) vaccine  Aged Out       Immunization History   Administered Date(s) Administered    COVID-19, PFIZER GRAY top, DO NOT Dilute, (age 15 y+), IM, 30 mcg/0.3 mL 06/10/2022    COVID-19, PFIZER PURPLE top, DILUTE for use, (age 15 y+), 30mcg/0.3mL 03/24/2021, 04/14/2021, 11/12/2021    FLUARIX 3 YEARS AND OVER 11/02/2015    Influenza Virus Vaccine 10/01/2013, 09/22/2014, 09/16/2020    Influenza, FLUARIX, FLULAVAL, FLUZONE (age 10 mo+) AND AFLURIA, (age 1 y+), PF, 0.5mL 09/13/2016, 09/11/2018, 09/17/2019, 10/07/2021    Pneumococcal Polysaccharide (Poggmaagf22) 08/01/2013    Tdap (Boostrix, Adacel) 07/10/2019       Review of Systems   Constitutional:  Negative for chills, fatigue and fever. HENT: Negative. Eyes:  Negative for visual disturbance. Respiratory:  Negative for cough. Gastrointestinal:  Negative for abdominal pain and nausea. Musculoskeletal:  Positive for arthralgias and back pain.    Skin:  Negative for rash.   Neurological:  Negative for dizziness and light-headedness. Psychiatric/Behavioral: Negative. Objective:   Physical Exam  Constitutional:       General: She is not in acute distress. Appearance: She is well-developed. HENT:      Right Ear: Tympanic membrane normal.      Left Ear: Tympanic membrane normal.      Nose: Nose normal.   Cardiovascular:      Rate and Rhythm: Normal rate and regular rhythm. Pulses: Normal pulses. Heart sounds: Normal heart sounds, S1 normal and S2 normal. No murmur heard. No S3 sounds. Pulmonary:      Effort: Pulmonary effort is normal.      Breath sounds: Normal breath sounds. No decreased breath sounds, wheezing or rhonchi. Abdominal:      General: Bowel sounds are normal.      Palpations: Abdomen is soft. Tenderness: There is no abdominal tenderness. Neurological:      Mental Status: She is alert and oriented to person, place, and time. Cranial Nerves: No cranial nerve deficit. Sensory: No sensory deficit. Coordination: Coordination normal.      Gait: Gait normal.   Psychiatric:         Behavior: Behavior normal.       Assessment:       Diagnosis Orders   1. Well adult exam  Hemoglobin A1C    Lipid Panel    Comprehensive Metabolic Panel, Fasting    TSH with Reflex      2. Mixed hyperlipidemia  atorvastatin (LIPITOR) 20 MG tablet      3. IFG (impaired fasting glucose)  metFORMIN (GLUCOPHAGE) 850 MG tablet      4. Rheumatoid arthritis, involving unspecified site, unspecified whether rheumatoid factor present ([address]        5.  Immunosuppressed status ([address]                Plan:      Chronic conditions stable  Labs as above  Refills as above  Follow up with Specialists  Immunizations discussed  Healthy Lifestyles discussed  RTO in 1 year

## 2023-09-14 ENCOUNTER — OFFICE VISIT (OUTPATIENT)
Dept: FAMILY MEDICINE CLINIC | Age: 58
End: 2023-09-14

## 2023-09-14 VITALS
RESPIRATION RATE: 12 BRPM | DIASTOLIC BLOOD PRESSURE: 70 MMHG | WEIGHT: 167.1 LBS | BODY MASS INDEX: 26.17 KG/M2 | SYSTOLIC BLOOD PRESSURE: 118 MMHG | HEART RATE: 68 BPM

## 2023-09-14 DIAGNOSIS — R53.83 OTHER FATIGUE: ICD-10-CM

## 2023-09-14 DIAGNOSIS — F44.5 PSYCHOGENIC NONEPILEPTIC SEIZURE: Primary | ICD-10-CM

## 2023-09-14 RX ORDER — IBANDRONATE SODIUM 150 MG/1
150 TABLET, FILM COATED ORAL
COMMUNITY

## 2023-09-14 NOTE — PROGRESS NOTES
03/08/2022    HDL 42 08/31/2020     Lab Results   Component Value Date    LDLCALC 71 02/24/2023    LDLCALC 77 03/08/2022    LDLCALC 76 08/31/2020     Lab Results   Component Value Date    LABVLDL 28 02/24/2023    LABVLDL 17 03/08/2022    LABVLDL 23 08/31/2020         Chemistry        Component Value Date/Time     02/24/2023 0920    K 4.2 02/24/2023 0920     02/24/2023 0920    CO2 25 02/24/2023 0920    BUN 8 02/24/2023 0920    CREATININE 0.65 02/24/2023 0920        Component Value Date/Time    CALCIUM 10.0 02/24/2023 0920    ALKPHOS 80 02/24/2023 0920    ALKPHOS 75 02/11/2020 1925    AST 23 02/24/2023 0920    ALT 31 02/24/2023 0920    BILITOT 0.5 02/24/2023 0920            Lab Results   Component Value Date    TSH 2.17 02/24/2023       Lab Results   Component Value Date    WBC 6.3 03/08/2022    HGB 12.6 03/08/2022    HCT 39.0 03/08/2022    MCV 95 03/08/2022     03/08/2022         Health Maintenance   Topic Date Due    Shingles vaccine (1 of 2) Never done    Cervical cancer screen  Never done    Pneumococcal 0-64 years Vaccine (2 - PCV) 08/01/2014    COVID-19 Vaccine (5 - Pfizer risk series) 08/05/2022    Flu vaccine (1) 08/01/2023    Colorectal Cancer Screen  01/23/2024    Lipids  02/24/2024    Depression Screen  02/24/2024    Breast cancer screen  03/16/2024    Diabetes screen  02/24/2026    DTaP/Tdap/Td vaccine (2 - Td or Tdap) 07/10/2029    Hepatitis C screen  Completed    HIV screen  Completed    Hepatitis A vaccine  Aged Out    Hepatitis B vaccine  Aged Out    Hib vaccine  Aged Out    Meningococcal (ACWY) vaccine  Aged Out    A1C test (Diabetic or Prediabetic)  Discontinued       Immunization History   Administered Date(s) Administered    COVID-19, PFIZER GRAY top, DO NOT Dilute, (age 15 y+), IM, 30 mcg/0.3 mL 06/10/2022    COVID-19, PFIZER PURPLE top, DILUTE for use, (age 15 y+), 30mcg/0.3mL 03/24/2021, 04/14/2021, 11/12/2021    FLUARIX 3 YEARS AND OVER 11/02/2015    Influenza Virus Vaccine

## 2023-10-04 ENCOUNTER — IMMUNIZATION (OUTPATIENT)
Dept: FAMILY MEDICINE CLINIC | Age: 58
End: 2023-10-04

## 2023-10-04 DIAGNOSIS — Z23 NEED FOR INFLUENZA VACCINATION: Primary | ICD-10-CM

## 2023-10-04 NOTE — PROGRESS NOTES
Immunizations Administered       Name Date Dose Route    Influenza, FLUCELVAX, (age 10 mo+), MDCK, PF, 0.5mL 10/4/2023 0.5 mL Intramuscular    Site: Deltoid- Left    Lot: [id number]    NDC: 58103-980-36          Patient was given Flucelvax Quadrivalent flu vaccine 0.5 ml IM in left deltoid per erica Lyon CNP. Patient tolerated well. VIS given and reviewed.   NDC# 37613-586-85  LOT# [id number] Exp: 6/27/2024

## 2023-10-17 LAB — TSH SERPL DL<=0.05 MIU/L-ACNC: 2.45 UIU/ML (ref 0.4–4.1)

## 2024-01-01 ENCOUNTER — APPOINTMENT (OUTPATIENT)
Dept: ULTRASOUND IMAGING | Age: 59
End: 2024-01-01
Payer: COMMERCIAL

## 2024-01-01 ENCOUNTER — APPOINTMENT (OUTPATIENT)
Dept: GENERAL RADIOLOGY | Age: 59
End: 2024-01-01
Payer: COMMERCIAL

## 2024-01-01 ENCOUNTER — APPOINTMENT (OUTPATIENT)
Dept: CT IMAGING | Age: 59
End: 2024-01-01
Payer: COMMERCIAL

## 2024-01-01 ENCOUNTER — HOSPITAL ENCOUNTER (INPATIENT)
Age: 59
LOS: 8 days | End: 2024-06-13
Attending: EMERGENCY MEDICINE | Admitting: INTERNAL MEDICINE
Payer: COMMERCIAL

## 2024-01-01 VITALS
RESPIRATION RATE: 14 BRPM | TEMPERATURE: 98.1 F | SYSTOLIC BLOOD PRESSURE: 98 MMHG | WEIGHT: 119.05 LBS | HEIGHT: 67 IN | BODY MASS INDEX: 18.69 KG/M2 | OXYGEN SATURATION: 94 % | DIASTOLIC BLOOD PRESSURE: 61 MMHG

## 2024-01-01 DIAGNOSIS — J18.9 PNEUMONIA OF RIGHT LOWER LOBE DUE TO INFECTIOUS ORGANISM: ICD-10-CM

## 2024-01-01 DIAGNOSIS — A41.9 SEPTICEMIA (HCC): ICD-10-CM

## 2024-01-01 DIAGNOSIS — J96.21 ACUTE ON CHRONIC RESPIRATORY FAILURE WITH HYPOXIA (HCC): Primary | ICD-10-CM

## 2024-01-01 DIAGNOSIS — J80 ACUTE RESPIRATORY DISTRESS SYNDROME (ARDS) (HCC): ICD-10-CM

## 2024-01-01 DIAGNOSIS — J96.01 ACUTE RESPIRATORY FAILURE WITH HYPOXIA (HCC): ICD-10-CM

## 2024-01-01 DIAGNOSIS — J18.9 PNEUMONIA OF RIGHT MIDDLE LOBE DUE TO INFECTIOUS ORGANISM: ICD-10-CM

## 2024-01-01 DIAGNOSIS — J90 PLEURAL EFFUSION ON RIGHT: ICD-10-CM

## 2024-01-01 LAB
ACINETOBACTER CALCOACETICUS-BAUMANNII BY PCR: NOT DETECTED
ALBUMIN FLD-MCNC: 2 GM/DL
ALBUMIN SERPL BCG-MCNC: 2.8 G/DL (ref 3.5–5.1)
ALBUMIN SERPL BCG-MCNC: 2.9 G/DL (ref 3.5–5.1)
ALBUMIN SERPL BCG-MCNC: 3.1 G/DL (ref 3.5–5.1)
ALBUMIN SERPL BCG-MCNC: 3.4 G/DL (ref 3.5–5.1)
ALP SERPL-CCNC: 127 U/L (ref 38–126)
ALP SERPL-CCNC: 145 U/L (ref 38–126)
ALP SERPL-CCNC: 156 U/L (ref 38–126)
ALP SERPL-CCNC: 156 U/L (ref 38–126)
ALT SERPL W/O P-5'-P-CCNC: 10 U/L (ref 11–66)
ALT SERPL W/O P-5'-P-CCNC: 8 U/L (ref 11–66)
ALT SERPL W/O P-5'-P-CCNC: 8 U/L (ref 11–66)
ALT SERPL W/O P-5'-P-CCNC: < 5 U/L (ref 11–66)
AMPHETAMINES UR QL SCN: NEGATIVE
AMYLASE FLD-CCNC: 10 U/L
ANION GAP SERPL CALC-SCNC: 11 MEQ/L (ref 8–16)
ANION GAP SERPL CALC-SCNC: 11 MEQ/L (ref 8–16)
ANION GAP SERPL CALC-SCNC: 12 MEQ/L (ref 8–16)
ANION GAP SERPL CALC-SCNC: 32 MEQ/L (ref 8–16)
ANION GAP SERPL CALC-SCNC: 7 MEQ/L (ref 8–16)
ANION GAP SERPL CALC-SCNC: 8 MEQ/L (ref 8–16)
ANION GAP SERPL CALC-SCNC: 8 MEQ/L (ref 8–16)
ANION GAP SERPL CALC-SCNC: 9 MEQ/L (ref 8–16)
ANISOCYTOSIS BLD QL SMEAR: PRESENT
ANISOCYTOSIS BLD QL SMEAR: PRESENT
APTT PPP: 100.6 SECONDS (ref 22–38)
APTT PPP: 110.9 SECONDS (ref 22–38)
APTT PPP: 35.4 SECONDS (ref 22–38)
APTT PPP: 35.7 SECONDS (ref 22–38)
APTT PPP: 72.4 SECONDS (ref 22–38)
APTT PPP: 92 SECONDS (ref 22–38)
ARTERIAL PATENCY WRIST A: POSITIVE
AST SERPL-CCNC: 12 U/L (ref 5–40)
AST SERPL-CCNC: 17 U/L (ref 5–40)
AST SERPL-CCNC: 19 U/L (ref 5–40)
AST SERPL-CCNC: 19 U/L (ref 5–40)
AUTO DIFF PNL BLD: ABNORMAL
BACTERIA BLD AEROBE CULT: NORMAL
BACTERIA BLD AEROBE CULT: NORMAL
BACTERIA SPEC ANAEROBE CULT: NORMAL
BACTERIA SPEC BFLD CULT: NORMAL
BACTERIA SPEC RESP CULT: ABNORMAL
BACTERIA SPEC RESP CULT: ABNORMAL
BACTERIA: ABNORMAL
BARBITURATES UR QL SCN: NEGATIVE
BASE EXCESS BLDA CALC-SCNC: -5.3 MMOL/L (ref -2.5–2.5)
BASE EXCESS BLDA CALC-SCNC: ABNORMAL MMOL/L (ref -2–3)
BASOPHILS ABSOLUTE: 0 THOU/MM3 (ref 0–0.1)
BASOPHILS ABSOLUTE: 0 THOU/MM3 (ref 0–0.1)
BASOPHILS ABSOLUTE: 0.1 THOU/MM3 (ref 0–0.1)
BASOPHILS ABSOLUTE: 0.2 THOU/MM3 (ref 0–0.1)
BASOPHILS NFR BLD AUTO: 0.2 %
BASOPHILS NFR BLD AUTO: 0.4 %
BASOPHILS NFR BLD AUTO: 0.5 %
BASOPHILS NFR BLD AUTO: 0.6 %
BASOPHILS NFR BLD AUTO: 0.7 %
BASOPHILS NFR BLD AUTO: 0.9 %
BASOPHILS NFR BLD AUTO: 1.1 %
BDY SITE: ABNORMAL
BENZODIAZ UR QL SCN: NEGATIVE
BILIRUB CONJ SERPL-MCNC: 0.3 MG/DL (ref 0–0.3)
BILIRUB CONJ SERPL-MCNC: 0.4 MG/DL (ref 0–0.3)
BILIRUB SERPL-MCNC: 0.6 MG/DL (ref 0.3–1.2)
BILIRUB SERPL-MCNC: 0.7 MG/DL (ref 0.3–1.2)
BILIRUB SERPL-MCNC: 0.8 MG/DL (ref 0.3–1.2)
BILIRUB SERPL-MCNC: 1.3 MG/DL (ref 0.3–1.2)
BILIRUB UR QL STRIP: NEGATIVE
BLACTX-M ISLT/SPM QL: NORMAL
BLAIMP ISLT/SPM QL: NORMAL
BLAKPC ISLT/SPM QL: NORMAL
BLAOXA-48-LIKE ISLT/SPM QL: NORMAL
BLAVIM ISLT/SPM QL: NORMAL
BUN SERPL-MCNC: 10 MG/DL (ref 7–22)
BUN SERPL-MCNC: 11 MG/DL (ref 7–22)
BUN SERPL-MCNC: 17 MG/DL (ref 7–22)
BUN SERPL-MCNC: 5 MG/DL (ref 7–22)
BUN SERPL-MCNC: 7 MG/DL (ref 7–22)
BUN SERPL-MCNC: 8 MG/DL (ref 7–22)
BUN SERPL-MCNC: 9 MG/DL (ref 7–22)
BUN SERPL-MCNC: 9 MG/DL (ref 7–22)
BZE UR QL SCN: NEGATIVE
C PNEUM DNA LOWER RESP QL NAA+NON-PROBE: NOT DETECTED
CA-I BLD ISE-SCNC: 1.15 MMOL/L (ref 1.12–1.32)
CALCIUM SERPL-MCNC: 7.8 MG/DL (ref 8.5–10.5)
CALCIUM SERPL-MCNC: 8.1 MG/DL (ref 8.5–10.5)
CALCIUM SERPL-MCNC: 8.3 MG/DL (ref 8.5–10.5)
CALCIUM SERPL-MCNC: 8.4 MG/DL (ref 8.5–10.5)
CALCIUM SERPL-MCNC: 8.4 MG/DL (ref 8.5–10.5)
CALCIUM SERPL-MCNC: 8.7 MG/DL (ref 8.5–10.5)
CALCIUM SERPL-MCNC: 9 MG/DL (ref 8.5–10.5)
CALCIUM SERPL-MCNC: 9 MG/DL (ref 8.5–10.5)
CANNABINOIDS UR QL SCN: NEGATIVE
CASTS #/AREA URNS LPF: ABNORMAL /LPF
CASTS #/AREA URNS LPF: ABNORMAL /LPF
CHARACTER UR: CLEAR
CHARACTER, BODY FLUID: ABNORMAL
CHARCOAL URNS QL MICRO: ABNORMAL
CHLORIDE SERPL-SCNC: 100 MEQ/L (ref 98–111)
CHLORIDE SERPL-SCNC: 101 MEQ/L (ref 98–111)
CHLORIDE SERPL-SCNC: 105 MEQ/L (ref 98–111)
CHLORIDE SERPL-SCNC: 105 MEQ/L (ref 98–111)
CHLORIDE SERPL-SCNC: 106 MEQ/L (ref 98–111)
CHLORIDE SERPL-SCNC: 106 MEQ/L (ref 98–111)
CHLORIDE SERPL-SCNC: 95 MEQ/L (ref 98–111)
CHLORIDE SERPL-SCNC: 97 MEQ/L (ref 98–111)
CO2 SERPL-SCNC: 20 MEQ/L (ref 23–33)
CO2 SERPL-SCNC: 21 MEQ/L (ref 23–33)
CO2 SERPL-SCNC: 21 MEQ/L (ref 23–33)
CO2 SERPL-SCNC: 22 MEQ/L (ref 23–33)
CO2 SERPL-SCNC: 24 MEQ/L (ref 23–33)
CO2 SERPL-SCNC: 25 MEQ/L (ref 23–33)
CO2 SERPL-SCNC: 25 MEQ/L (ref 23–33)
CO2 SERPL-SCNC: 28 MEQ/L (ref 23–33)
COLLECTED BY:: ABNORMAL
COLLECTED BY:: ABNORMAL
COLOR FLD: YELLOW
COLOR UR: ABNORMAL
CORTIS SERPL-MCNC: 24.22 UG/DL
CORTISOL COLLECTION INFO: NORMAL
CREAT SERPL-MCNC: 0.3 MG/DL (ref 0.4–1.2)
CREAT SERPL-MCNC: 0.4 MG/DL (ref 0.4–1.2)
CREAT SERPL-MCNC: 0.4 MG/DL (ref 0.4–1.2)
CREAT SERPL-MCNC: 0.5 MG/DL (ref 0.4–1.2)
CREAT SERPL-MCNC: 0.5 MG/DL (ref 0.4–1.2)
CREAT SERPL-MCNC: 0.6 MG/DL (ref 0.4–1.2)
CREAT SERPL-MCNC: 0.6 MG/DL (ref 0.4–1.2)
CREAT SERPL-MCNC: 1.4 MG/DL (ref 0.4–1.2)
CRP SERPL-MCNC: 11.28 MG/DL (ref 0–1)
CRYSTALS URNS QL MICRO: ABNORMAL
DACROCYTES: ABNORMAL
DEPRECATED RDW RBC AUTO: 59.7 FL (ref 35–45)
DEPRECATED RDW RBC AUTO: 60.7 FL (ref 35–45)
DEPRECATED RDW RBC AUTO: 61 FL (ref 35–45)
DEPRECATED RDW RBC AUTO: 63.6 FL (ref 35–45)
DEPRECATED RDW RBC AUTO: 64.1 FL (ref 35–45)
DEPRECATED RDW RBC AUTO: 65.7 FL (ref 35–45)
DEPRECATED RDW RBC AUTO: 67 FL (ref 35–45)
DEPRECATED RDW RBC AUTO: 74.4 FL (ref 35–45)
DEVICE: ABNORMAL
DEVICE: ABNORMAL
EKG ATRIAL RATE: 123 BPM
EKG P AXIS: 65 DEGREES
EKG P-R INTERVAL: 176 MS
EKG Q-T INTERVAL: 282 MS
EKG Q-T INTERVAL: 354 MS
EKG QRS DURATION: 68 MS
EKG QRS DURATION: 74 MS
EKG QTC CALCULATION (BAZETT): 403 MS
EKG QTC CALCULATION (BAZETT): 472 MS
EKG R AXIS: 23 DEGREES
EKG R AXIS: 45 DEGREES
EKG T AXIS: -163 DEGREES
EKG T AXIS: 56 DEGREES
EKG VENTRICULAR RATE: 107 BPM
EKG VENTRICULAR RATE: 123 BPM
ENTEROBACTER CLOACAE COMPLEX BY PCR: NOT DETECTED
EOSINOPHIL NFR BLD AUTO: 0.2 %
EOSINOPHIL NFR BLD AUTO: 0.3 %
EOSINOPHIL NFR BLD AUTO: 0.8 %
EOSINOPHIL NFR BLD AUTO: 1 %
EOSINOPHIL NFR BLD AUTO: 1.3 %
EOSINOPHIL NFR BLD AUTO: 1.3 %
EOSINOPHIL NFR BLD AUTO: 3.3 %
EOSINOPHILS ABSOLUTE: 0 THOU/MM3 (ref 0–0.4)
EOSINOPHILS ABSOLUTE: 0 THOU/MM3 (ref 0–0.4)
EOSINOPHILS ABSOLUTE: 0.1 THOU/MM3 (ref 0–0.4)
EOSINOPHILS ABSOLUTE: 0.1 THOU/MM3 (ref 0–0.4)
EOSINOPHILS ABSOLUTE: 0.2 THOU/MM3 (ref 0–0.4)
EOSINOPHILS ABSOLUTE: 0.2 THOU/MM3 (ref 0–0.4)
EOSINOPHILS ABSOLUTE: 0.3 THOU/MM3 (ref 0–0.4)
EPITHELIAL CELLS, UA: ABNORMAL /HPF
ERYTHROCYTE [DISTWIDTH] IN BLOOD BY AUTOMATED COUNT: 17.7 % (ref 11.5–14.5)
ERYTHROCYTE [DISTWIDTH] IN BLOOD BY AUTOMATED COUNT: 17.7 % (ref 11.5–14.5)
ERYTHROCYTE [DISTWIDTH] IN BLOOD BY AUTOMATED COUNT: 18 % (ref 11.5–14.5)
ERYTHROCYTE [DISTWIDTH] IN BLOOD BY AUTOMATED COUNT: 18.2 % (ref 11.5–14.5)
ERYTHROCYTE [DISTWIDTH] IN BLOOD BY AUTOMATED COUNT: 18.8 % (ref 11.5–14.5)
ERYTHROCYTE [DISTWIDTH] IN BLOOD BY AUTOMATED COUNT: 19.9 % (ref 11.5–14.5)
ERYTHROCYTE [DISTWIDTH] IN BLOOD BY AUTOMATED COUNT: 20.4 % (ref 11.5–14.5)
ERYTHROCYTE [DISTWIDTH] IN BLOOD BY AUTOMATED COUNT: 20.5 % (ref 11.5–14.5)
ESCHERICHIA COLI BY PCR: NOT DETECTED
FENTANYL: NEGATIVE
FERRITIN SERPL IA-MCNC: 864 NG/ML (ref 10–291)
FIO2 ON VENT O2 ANALYZER: 100 %
FIO2 ON VENT O2 ANALYZER: 36 %
FLUAV RNA LOWER RESP QL NAA+NON-PROBE: NOT DETECTED
FLUAV RNA RESP QL NAA+PROBE: NOT DETECTED
FLUBV RNA LOWER RESP QL NAA+NON-PROBE: NOT DETECTED
FLUBV RNA RESP QL NAA+PROBE: NOT DETECTED
FOLATE SERPL-MCNC: > 20 NG/ML (ref 4.8–24.2)
GFR SERPL CREATININE-BSD FRML MDRD: 43 ML/MIN/1.73M2
GFR SERPL CREATININE-BSD FRML MDRD: > 90 ML/MIN/1.73M2
GLUCOSE BLD STRIP.AUTO-MCNC: 115 MG/DL (ref 70–108)
GLUCOSE BLD STRIP.AUTO-MCNC: 133 MG/DL (ref 70–108)
GLUCOSE BLD STRIP.AUTO-MCNC: 62 MG/DL (ref 70–108)
GLUCOSE BLD STRIP.AUTO-MCNC: 89 MG/DL (ref 70–108)
GLUCOSE BLD STRIP.AUTO-MCNC: 90 MG/DL (ref 70–108)
GLUCOSE BLD STRIP.AUTO-MCNC: 98 MG/DL (ref 70–108)
GLUCOSE FLD-MCNC: 96 MG/DL
GLUCOSE SERPL-MCNC: 100 MG/DL (ref 70–108)
GLUCOSE SERPL-MCNC: 124 MG/DL (ref 70–108)
GLUCOSE SERPL-MCNC: 153 MG/DL (ref 70–108)
GLUCOSE SERPL-MCNC: 81 MG/DL (ref 70–108)
GLUCOSE SERPL-MCNC: 83 MG/DL (ref 70–108)
GLUCOSE SERPL-MCNC: 85 MG/DL (ref 70–108)
GLUCOSE SERPL-MCNC: 86 MG/DL (ref 70–108)
GLUCOSE SERPL-MCNC: 87 MG/DL (ref 70–108)
GLUCOSE UR QL STRIP.AUTO: NEGATIVE MG/DL
GRAM STN SPEC: ABNORMAL
GRAM STN SPEC: NORMAL
GRANULOCYTES NFR FLD AUTO: 82.4 %
HADV DNA LOWER RESP QL NAA+NON-PROBE: NOT DETECTED
HAEMOPHILUS INFLUENZAE BY PCR: NOT DETECTED
HCO3 BLDA-SCNC: 20 MMOL/L (ref 23–28)
HCO3 BLDA-SCNC: ABNORMAL MMOL/L (ref 23–28)
HCOV RNA LOWER RESP QL NAA+NON-PROBE: NOT DETECTED
HCT VFR BLD AUTO: 25.2 % (ref 37–47)
HCT VFR BLD AUTO: 26.3 % (ref 37–47)
HCT VFR BLD AUTO: 28.1 % (ref 37–47)
HCT VFR BLD AUTO: 28.3 % (ref 37–47)
HCT VFR BLD AUTO: 28.6 % (ref 37–47)
HCT VFR BLD AUTO: 29.3 % (ref 37–47)
HCT VFR BLD AUTO: 30.1 % (ref 37–47)
HCT VFR BLD AUTO: 30.7 % (ref 37–47)
HGB BLD-MCNC: 7.5 GM/DL (ref 12–16)
HGB BLD-MCNC: 7.5 GM/DL (ref 12–16)
HGB BLD-MCNC: 8.2 GM/DL (ref 12–16)
HGB BLD-MCNC: 8.5 GM/DL (ref 12–16)
HGB BLD-MCNC: 8.6 GM/DL (ref 12–16)
HGB BLD-MCNC: 8.7 GM/DL (ref 12–16)
HGB BLD-MCNC: 8.9 GM/DL (ref 12–16)
HGB BLD-MCNC: 9.3 GM/DL (ref 12–16)
HGB RETIC QN AUTO: 20.1 PG (ref 28.2–35.7)
HGB UR QL STRIP.AUTO: NEGATIVE
HMPV RNA LOWER RESP QL NAA+NON-PROBE: NOT DETECTED
HPIV RNA LOWER RESP QL NAA+NON-PROBE: NOT DETECTED
IMM GRANULOCYTES # BLD AUTO: 0.05 THOU/MM3 (ref 0–0.07)
IMM GRANULOCYTES # BLD AUTO: 0.06 THOU/MM3 (ref 0–0.07)
IMM GRANULOCYTES # BLD AUTO: 0.06 THOU/MM3 (ref 0–0.07)
IMM GRANULOCYTES # BLD AUTO: 0.07 THOU/MM3 (ref 0–0.07)
IMM GRANULOCYTES # BLD AUTO: 0.1 THOU/MM3 (ref 0–0.07)
IMM GRANULOCYTES # BLD AUTO: 0.24 THOU/MM3 (ref 0–0.07)
IMM GRANULOCYTES # BLD AUTO: 0.27 THOU/MM3 (ref 0–0.07)
IMM GRANULOCYTES NFR BLD AUTO: 0.4 %
IMM GRANULOCYTES NFR BLD AUTO: 0.5 %
IMM GRANULOCYTES NFR BLD AUTO: 0.5 %
IMM GRANULOCYTES NFR BLD AUTO: 0.7 %
IMM GRANULOCYTES NFR BLD AUTO: 1.1 %
IMM GRANULOCYTES NFR BLD AUTO: 1.2 %
IMM GRANULOCYTES NFR BLD AUTO: 1.4 %
IMM RETICS NFR: 21.2 % (ref 3–15.9)
INR PPP: 2.06 (ref 0.85–1.13)
IRON SATN MFR SERPL: 15 % (ref 20–50)
IRON SERPL-MCNC: 15 UG/DL (ref 50–170)
KETONES UR QL STRIP.AUTO: ABNORMAL
KLEBSIELLA AEROGENES BY PCR: NOT DETECTED
KLEBSIELLA OXYTOCA BY PCR: NOT DETECTED
KLEBSIELLA PNEUMONIAE GROUP BY PCR: NOT DETECTED
L PNEUMO DNA LOWER RESP QL NAA+NON-PROBE: NOT DETECTED
L PNEUMO1 AG UR QL IA.RAPID: NEGATIVE
LACTATE SERPL-SCNC: 1.3 MMOL/L (ref 0.5–2)
LACTATE SERPL-SCNC: 26.3 MMOL/L (ref 0.5–2)
LACTIC ACID, SEPSIS: 0.8 MMOL/L (ref 0.5–1.9)
LACTIC ACID, SEPSIS: 0.8 MMOL/L (ref 0.5–1.9)
LDH FLD L TO P-CCNC: 210 U/L
LDH SERPL L TO P-CCNC: 299 U/L (ref 100–190)
LEUKOCYTE ESTERASE UR QL STRIP.AUTO: ABNORMAL
LIPASE SERPL-CCNC: 7.4 U/L (ref 5.6–51.3)
LYMPHOCYTES ABSOLUTE: 0.7 THOU/MM3 (ref 1–4.8)
LYMPHOCYTES ABSOLUTE: 0.7 THOU/MM3 (ref 1–4.8)
LYMPHOCYTES ABSOLUTE: 1 THOU/MM3 (ref 1–4.8)
LYMPHOCYTES ABSOLUTE: 1.1 THOU/MM3 (ref 1–4.8)
LYMPHOCYTES ABSOLUTE: 1.1 THOU/MM3 (ref 1–4.8)
LYMPHOCYTES ABSOLUTE: 1.7 THOU/MM3 (ref 1–4.8)
LYMPHOCYTES ABSOLUTE: 2.3 THOU/MM3 (ref 1–4.8)
LYMPHOCYTES NFR BLD AUTO: 11.9 %
LYMPHOCYTES NFR BLD AUTO: 12.2 %
LYMPHOCYTES NFR BLD AUTO: 5.7 %
LYMPHOCYTES NFR BLD AUTO: 6.9 %
LYMPHOCYTES NFR BLD AUTO: 8.3 %
LYMPHOCYTES NFR BLD AUTO: 8.5 %
LYMPHOCYTES NFR BLD AUTO: 9.4 %
M PNEUMO DNA LOWER RESP QL NAA+NON-PROBE: NOT DETECTED
MAGNESIUM SERPL-MCNC: 1.7 MG/DL (ref 1.6–2.4)
MAGNESIUM SERPL-MCNC: 1.9 MG/DL (ref 1.6–2.4)
MAGNESIUM SERPL-MCNC: 2.5 MG/DL (ref 1.6–2.4)
MCH RBC QN AUTO: 27.4 PG (ref 26–33)
MCH RBC QN AUTO: 27.6 PG (ref 26–33)
MCH RBC QN AUTO: 27.9 PG (ref 26–33)
MCH RBC QN AUTO: 28 PG (ref 26–33)
MCH RBC QN AUTO: 28 PG (ref 26–33)
MCH RBC QN AUTO: 28.1 PG (ref 26–33)
MCH RBC QN AUTO: 28.8 PG (ref 26–33)
MCH RBC QN AUTO: 29.2 PG (ref 26–33)
MCHC RBC AUTO-ENTMCNC: 28.5 GM/DL (ref 32.2–35.5)
MCHC RBC AUTO-ENTMCNC: 29.2 GM/DL (ref 32.2–35.5)
MCHC RBC AUTO-ENTMCNC: 29.6 GM/DL (ref 32.2–35.5)
MCHC RBC AUTO-ENTMCNC: 29.7 GM/DL (ref 32.2–35.5)
MCHC RBC AUTO-ENTMCNC: 29.7 GM/DL (ref 32.2–35.5)
MCHC RBC AUTO-ENTMCNC: 29.8 GM/DL (ref 32.2–35.5)
MCHC RBC AUTO-ENTMCNC: 30.3 GM/DL (ref 32.2–35.5)
MCHC RBC AUTO-ENTMCNC: 30.4 GM/DL (ref 32.2–35.5)
MCV RBC AUTO: 102.3 FL (ref 81–99)
MCV RBC AUTO: 92.3 FL (ref 81–99)
MCV RBC AUTO: 92.5 FL (ref 81–99)
MCV RBC AUTO: 93.9 FL (ref 81–99)
MCV RBC AUTO: 94 FL (ref 81–99)
MCV RBC AUTO: 94.6 FL (ref 81–99)
MCV RBC AUTO: 94.7 FL (ref 81–99)
MCV RBC AUTO: 95 FL (ref 81–99)
MONOCYTES ABSOLUTE: 1.3 THOU/MM3 (ref 0.4–1.3)
MONOCYTES ABSOLUTE: 1.4 THOU/MM3 (ref 0.4–1.3)
MONOCYTES ABSOLUTE: 1.6 THOU/MM3 (ref 0.4–1.3)
MONOCYTES ABSOLUTE: 2.1 THOU/MM3 (ref 0.4–1.3)
MONOCYTES ABSOLUTE: 2.3 THOU/MM3 (ref 0.4–1.3)
MONOCYTES ABSOLUTE: 2.7 THOU/MM3 (ref 0.4–1.3)
MONOCYTES ABSOLUTE: 3 THOU/MM3 (ref 0.4–1.3)
MONOCYTES NFR BLD AUTO: 13.1 %
MONOCYTES NFR BLD AUTO: 13.5 %
MONOCYTES NFR BLD AUTO: 13.6 %
MONOCYTES NFR BLD AUTO: 14.7 %
MONOCYTES NFR BLD AUTO: 15.8 %
MONOCYTES NFR BLD AUTO: 18.2 %
MONOCYTES NFR BLD AUTO: 19.3 %
MONONUC CELLS NFR FLD AUTO: 17.6 %
MORAXELLA CATARRHALIS BY PCR: NOT DETECTED
MRSA DNA SPEC QL NAA+PROBE: NEGATIVE
MRSA DNA SPEC QL NAA+PROBE: NEGATIVE
NEUTROPHILS ABSOLUTE: 13.1 THOU/MM3 (ref 1.8–7.7)
NEUTROPHILS ABSOLUTE: 15.3 THOU/MM3 (ref 1.8–7.7)
NEUTROPHILS ABSOLUTE: 5.9 THOU/MM3 (ref 1.8–7.7)
NEUTROPHILS ABSOLUTE: 8 THOU/MM3 (ref 1.8–7.7)
NEUTROPHILS ABSOLUTE: 8.2 THOU/MM3 (ref 1.8–7.7)
NEUTROPHILS ABSOLUTE: 8.4 THOU/MM3 (ref 1.8–7.7)
NEUTROPHILS ABSOLUTE: 9.1 THOU/MM3 (ref 1.8–7.7)
NEUTROPHILS NFR BLD AUTO: 67.6 %
NEUTROPHILS NFR BLD AUTO: 68.7 %
NEUTROPHILS NFR BLD AUTO: 69.9 %
NEUTROPHILS NFR BLD AUTO: 71.2 %
NEUTROPHILS NFR BLD AUTO: 75.2 %
NEUTROPHILS NFR BLD AUTO: 78.9 %
NEUTROPHILS NFR BLD AUTO: 79 %
NITRITE UR QL STRIP.AUTO: NEGATIVE
NRBC BLD AUTO-RTO: 0 /100 WBC
NRBC BLD AUTO-RTO: 1 /100 WBC
NRBC BLD AUTO-RTO: 1 /100 WBC
NT-PROBNP SERPL IA-MCNC: 1690 PG/ML (ref 0–124)
NT-PROBNP SERPL IA-MCNC: ABNORMAL PG/ML (ref 0–124)
NUC CELL # FLD AUTO: 6278 /CUMM (ref 0–500)
OPIATES UR QL SCN: NEGATIVE
ORGANISM: ABNORMAL
OSMOLALITY SERPL CALC.SUM OF ELEC: 261.9 MOSMOL/KG (ref 275–300)
OSMOLALITY SERPL: 277 MOSMOL/KG (ref 275–295)
OXYCODONE: POSITIVE
PATHOLOGIST REVIEW: ABNORMAL
PATHOLOGIST REVIEW: ABNORMAL
PCO2 BLDA: 35 MMHG (ref 35–45)
PCO2 TEMP ADJ BLDMV: > 149 MMHG (ref 41–51)
PCP UR QL SCN: NEGATIVE
PH BLDA: 7.36 [PH] (ref 7.35–7.45)
PH BLDMV: 6.86 [PH] (ref 7.31–7.41)
PH UR STRIP.AUTO: 5.5 [PH] (ref 5–9)
PHOSPHATE SERPL-MCNC: 1.6 MG/DL (ref 2.4–4.7)
PHOSPHATE SERPL-MCNC: 2.6 MG/DL (ref 2.4–4.7)
PLATELET # BLD AUTO: 385 THOU/MM3 (ref 130–400)
PLATELET # BLD AUTO: 422 THOU/MM3 (ref 130–400)
PLATELET # BLD AUTO: 426 THOU/MM3 (ref 130–400)
PLATELET # BLD AUTO: 448 THOU/MM3 (ref 130–400)
PLATELET # BLD AUTO: 464 THOU/MM3 (ref 130–400)
PLATELET # BLD AUTO: 475 THOU/MM3 (ref 130–400)
PLATELET # BLD AUTO: 571 THOU/MM3 (ref 130–400)
PLATELET # BLD AUTO: 612 THOU/MM3 (ref 130–400)
PLATELET BLD QL SMEAR: ABNORMAL
PLATELET BLD QL SMEAR: ABNORMAL
PLATELET BLD QL SMEAR: ADEQUATE
PMV BLD AUTO: 10.4 FL (ref 9.4–12.4)
PMV BLD AUTO: 10.5 FL (ref 9.4–12.4)
PMV BLD AUTO: 10.7 FL (ref 9.4–12.4)
PMV BLD AUTO: 10.7 FL (ref 9.4–12.4)
PMV BLD AUTO: 10.9 FL (ref 9.4–12.4)
PMV BLD AUTO: 11 FL (ref 9.4–12.4)
PO2 BLDA: 68 MMHG (ref 71–104)
PO2 BLDMV: 18 MMHG (ref 25–40)
POC CREATININE WHOLE BLOOD: 1.5 MG/DL (ref 0.5–1.2)
POIKILOCYTES: ABNORMAL
POLYCHROMASIA BLD QL SMEAR: ABNORMAL
POTASSIUM SERPL-SCNC: 3 MEQ/L (ref 3.5–5.2)
POTASSIUM SERPL-SCNC: 3.4 MEQ/L (ref 3.5–5.2)
POTASSIUM SERPL-SCNC: 3.4 MEQ/L (ref 3.5–5.2)
POTASSIUM SERPL-SCNC: 3.7 MEQ/L (ref 3.5–5.2)
POTASSIUM SERPL-SCNC: 3.9 MEQ/L (ref 3.5–5.2)
POTASSIUM SERPL-SCNC: 4.3 MEQ/L (ref 3.5–5.2)
POTASSIUM SERPL-SCNC: 4.5 MEQ/L (ref 3.5–5.2)
POTASSIUM SERPL-SCNC: 4.8 MEQ/L (ref 3.5–5.2)
POTASSIUM SERPL-SCNC: 6.6 MEQ/L (ref 3.5–5.2)
PROCALCITONIN SERPL IA-MCNC: 0.27 NG/ML (ref 0.01–0.09)
PROT FLD-MCNC: 3.4 GM/DL
PROT SERPL-MCNC: 6.1 G/DL (ref 6.1–8)
PROT SERPL-MCNC: 6.1 G/DL (ref 6.1–8)
PROT SERPL-MCNC: 6.5 G/DL (ref 6.1–8)
PROT SERPL-MCNC: 7 G/DL (ref 6.1–8)
PROT UR STRIP.AUTO-MCNC: ABNORMAL MG/DL
PROTEUS SPECIES BY PCR: NOT DETECTED
PSEUDOMONAS AERUGINOSA BY PCR: NOT DETECTED
RBC # BLD AUTO: 2.57 MILL/MM3 (ref 4.2–5.4)
RBC # BLD AUTO: 2.68 MILL/MM3 (ref 4.2–5.4)
RBC # BLD AUTO: 2.97 MILL/MM3 (ref 4.2–5.4)
RBC # BLD AUTO: 3.06 MILL/MM3 (ref 4.2–5.4)
RBC # BLD AUTO: 3.1 MILL/MM3 (ref 4.2–5.4)
RBC # BLD AUTO: 3.12 MILL/MM3 (ref 4.2–5.4)
RBC # BLD AUTO: 3.18 MILL/MM3 (ref 4.2–5.4)
RBC # BLD AUTO: 3.23 MILL/MM3 (ref 4.2–5.4)
RBC # FLD AUTO: 9000 /CUMM
RBC #/AREA URNS HPF: ABNORMAL /HPF
RENAL EPI CELLS #/AREA URNS HPF: ABNORMAL /[HPF]
RESISTANT GENE MECA/C & MREJ BY PCR: NORMAL
RESISTANT GENE NDM BY PCR: NORMAL
RETICS # AUTO: 82 THOU/MM3 (ref 20–115)
RETICS/RBC NFR AUTO: 2.8 % (ref 0.5–2)
RSV RNA LOWER RESP QL NAA+NON-PROBE: NOT DETECTED
RV+EV RNA LOWER RESP QL NAA+NON-PROBE: NOT DETECTED
SAO2 % BLDA: 93 %
SAO2 % BLDMV: ABNORMAL %
SARS-COV-2 RNA RESP QL NAA+PROBE: NOT DETECTED
SCAN OF BLOOD SMEAR: NORMAL
SERRATIA MARCESCENS BY PCR: NOT DETECTED
SITE: ABNORMAL
SODIUM SERPL-SCNC: 132 MEQ/L (ref 135–145)
SODIUM SERPL-SCNC: 134 MEQ/L (ref 135–145)
SODIUM SERPL-SCNC: 134 MEQ/L (ref 135–145)
SODIUM SERPL-SCNC: 137 MEQ/L (ref 135–145)
SODIUM SERPL-SCNC: 137 MEQ/L (ref 135–145)
SODIUM SERPL-SCNC: 138 MEQ/L (ref 135–145)
SODIUM SERPL-SCNC: 139 MEQ/L (ref 135–145)
SODIUM SERPL-SCNC: 148 MEQ/L (ref 135–145)
SOURCE: NORMAL
SP GR UR REFRACT.AUTO: > 1.03 (ref 1–1.03)
SPECIMEN ACCEPTABILITY: NORMAL
SPECIMEN: ABNORMAL
STAPH AUREUS BY PCR: NOT DETECTED
STREP AGALACTIAE BY PCR: NOT DETECTED
STREP PNEUMONIAE BY PCR: NOT DETECTED
STREP PYOGENES BY PCR: NOT DETECTED
T4 FREE SERPL-MCNC: 1.28 NG/DL (ref 0.93–1.68)
TIBC SERPL-MCNC: 102 UG/DL (ref 171–450)
TOTAL VOLUME RECEIVED BODY FLUID: 70 ML
TROPONIN, HIGH SENSITIVITY: 16 NG/L (ref 0–12)
TSH SERPL DL<=0.005 MIU/L-ACNC: 6.56 UIU/ML (ref 0.4–4.2)
UROBILINOGEN UR QL STRIP.AUTO: 0.2 EU/DL (ref 0–1)
VENTILATION MODE VENT: ABNORMAL
VIT B12 SERPL-MCNC: > 2000 PG/ML (ref 211–911)
WBC # BLD AUTO: 10.4 THOU/MM3 (ref 4.8–10.8)
WBC # BLD AUTO: 11.4 THOU/MM3 (ref 4.8–10.8)
WBC # BLD AUTO: 11.5 THOU/MM3 (ref 4.8–10.8)
WBC # BLD AUTO: 11.8 THOU/MM3 (ref 4.8–10.8)
WBC # BLD AUTO: 19 THOU/MM3 (ref 4.8–10.8)
WBC # BLD AUTO: 20.3 THOU/MM3 (ref 4.8–10.8)
WBC # BLD AUTO: 20.9 THOU/MM3 (ref 4.8–10.8)
WBC # BLD AUTO: 8.8 THOU/MM3 (ref 4.8–10.8)
WBC #/AREA URNS HPF: ABNORMAL /HPF
YEAST LIKE FUNGI URNS QL MICRO: ABNORMAL

## 2024-01-01 PROCEDURE — 85025 COMPLETE CBC W/AUTO DIFF WBC: CPT

## 2024-01-01 PROCEDURE — 3E033XZ INTRODUCTION OF VASOPRESSOR INTO PERIPHERAL VEIN, PERCUTANEOUS APPROACH: ICD-10-PCS | Performed by: STUDENT IN AN ORGANIZED HEALTH CARE EDUCATION/TRAINING PROGRAM

## 2024-01-01 PROCEDURE — 2100000000 HC CCU R&B

## 2024-01-01 PROCEDURE — 2500000003 HC RX 250 WO HCPCS

## 2024-01-01 PROCEDURE — 6370000000 HC RX 637 (ALT 250 FOR IP): Performed by: STUDENT IN AN ORGANIZED HEALTH CARE EDUCATION/TRAINING PROGRAM

## 2024-01-01 PROCEDURE — 87641 MR-STAPH DNA AMP PROBE: CPT

## 2024-01-01 PROCEDURE — 6370000000 HC RX 637 (ALT 250 FOR IP): Performed by: NURSE PRACTITIONER

## 2024-01-01 PROCEDURE — 71045 X-RAY EXAM CHEST 1 VIEW: CPT

## 2024-01-01 PROCEDURE — 6360000002 HC RX W HCPCS: Performed by: INTERNAL MEDICINE

## 2024-01-01 PROCEDURE — 83540 ASSAY OF IRON: CPT

## 2024-01-01 PROCEDURE — 85730 THROMBOPLASTIN TIME PARTIAL: CPT

## 2024-01-01 PROCEDURE — 87070 CULTURE OTHR SPECIMN AEROBIC: CPT

## 2024-01-01 PROCEDURE — 82565 ASSAY OF CREATININE: CPT

## 2024-01-01 PROCEDURE — 0W9G3ZZ DRAINAGE OF PERITONEAL CAVITY, PERCUTANEOUS APPROACH: ICD-10-PCS | Performed by: RADIOLOGY

## 2024-01-01 PROCEDURE — P9047 ALBUMIN (HUMAN), 25%, 50ML: HCPCS | Performed by: STUDENT IN AN ORGANIZED HEALTH CARE EDUCATION/TRAINING PROGRAM

## 2024-01-01 PROCEDURE — 6360000002 HC RX W HCPCS

## 2024-01-01 PROCEDURE — 6360000002 HC RX W HCPCS: Performed by: NURSE PRACTITIONER

## 2024-01-01 PROCEDURE — 87075 CULTR BACTERIA EXCEPT BLOOD: CPT

## 2024-01-01 PROCEDURE — 82746 ASSAY OF FOLIC ACID SERUM: CPT

## 2024-01-01 PROCEDURE — 87040 BLOOD CULTURE FOR BACTERIA: CPT

## 2024-01-01 PROCEDURE — 80048 BASIC METABOLIC PNL TOTAL CA: CPT

## 2024-01-01 PROCEDURE — 84132 ASSAY OF SERUM POTASSIUM: CPT

## 2024-01-01 PROCEDURE — 2700000000 HC OXYGEN THERAPY PER DAY

## 2024-01-01 PROCEDURE — 84484 ASSAY OF TROPONIN QUANT: CPT

## 2024-01-01 PROCEDURE — 36680 INSERT NEEDLE BONE CAVITY: CPT

## 2024-01-01 PROCEDURE — 82803 BLOOD GASES ANY COMBINATION: CPT

## 2024-01-01 PROCEDURE — 87486 CHLMYD PNEUM DNA AMP PROBE: CPT

## 2024-01-01 PROCEDURE — 6370000000 HC RX 637 (ALT 250 FOR IP): Performed by: INTERNAL MEDICINE

## 2024-01-01 PROCEDURE — 80053 COMPREHEN METABOLIC PANEL: CPT

## 2024-01-01 PROCEDURE — 82607 VITAMIN B-12: CPT

## 2024-01-01 PROCEDURE — 36591 DRAW BLOOD OFF VENOUS DEVICE: CPT

## 2024-01-01 PROCEDURE — 96368 THER/DIAG CONCURRENT INF: CPT

## 2024-01-01 PROCEDURE — 82948 REAGENT STRIP/BLOOD GLUCOSE: CPT

## 2024-01-01 PROCEDURE — 6360000002 HC RX W HCPCS: Performed by: STUDENT IN AN ORGANIZED HEALTH CARE EDUCATION/TRAINING PROGRAM

## 2024-01-01 PROCEDURE — 99291 CRITICAL CARE FIRST HOUR: CPT | Performed by: INTERNAL MEDICINE

## 2024-01-01 PROCEDURE — 83605 ASSAY OF LACTIC ACID: CPT

## 2024-01-01 PROCEDURE — 2580000003 HC RX 258: Performed by: STUDENT IN AN ORGANIZED HEALTH CARE EDUCATION/TRAINING PROGRAM

## 2024-01-01 PROCEDURE — 88305 TISSUE EXAM BY PATHOLOGIST: CPT

## 2024-01-01 PROCEDURE — 99233 SBSQ HOSP IP/OBS HIGH 50: CPT | Performed by: INTERNAL MEDICINE

## 2024-01-01 PROCEDURE — 2580000003 HC RX 258: Performed by: NURSE PRACTITIONER

## 2024-01-01 PROCEDURE — 5A0935A ASSISTANCE WITH RESPIRATORY VENTILATION, LESS THAN 24 CONSECUTIVE HOURS, HIGH NASAL FLOW/VELOCITY: ICD-10-PCS | Performed by: STUDENT IN AN ORGANIZED HEALTH CARE EDUCATION/TRAINING PROGRAM

## 2024-01-01 PROCEDURE — 83880 ASSAY OF NATRIURETIC PEPTIDE: CPT

## 2024-01-01 PROCEDURE — 71275 CT ANGIOGRAPHY CHEST: CPT

## 2024-01-01 PROCEDURE — 82947 ASSAY GLUCOSE BLOOD QUANT: CPT

## 2024-01-01 PROCEDURE — 87449 NOS EACH ORGANISM AG IA: CPT

## 2024-01-01 PROCEDURE — 83930 ASSAY OF BLOOD OSMOLALITY: CPT

## 2024-01-01 PROCEDURE — 94761 N-INVAS EAR/PLS OXIMETRY MLT: CPT

## 2024-01-01 PROCEDURE — 6370000000 HC RX 637 (ALT 250 FOR IP)

## 2024-01-01 PROCEDURE — 87636 SARSCOV2 & INF A&B AMP PRB: CPT

## 2024-01-01 PROCEDURE — 82248 BILIRUBIN DIRECT: CPT

## 2024-01-01 PROCEDURE — 83735 ASSAY OF MAGNESIUM: CPT

## 2024-01-01 PROCEDURE — 83615 LACTATE (LD) (LDH) ENZYME: CPT

## 2024-01-01 PROCEDURE — 92950 HEART/LUNG RESUSCITATION CPR: CPT

## 2024-01-01 PROCEDURE — 2580000003 HC RX 258: Performed by: INTERNAL MEDICINE

## 2024-01-01 PROCEDURE — 85046 RETICYTE/HGB CONCENTRATE: CPT

## 2024-01-01 PROCEDURE — 31500 INSERT EMERGENCY AIRWAY: CPT

## 2024-01-01 PROCEDURE — 87798 DETECT AGENT NOS DNA AMP: CPT

## 2024-01-01 PROCEDURE — 84295 ASSAY OF SERUM SODIUM: CPT

## 2024-01-01 PROCEDURE — 82945 GLUCOSE OTHER FLUID: CPT

## 2024-01-01 PROCEDURE — 82330 ASSAY OF CALCIUM: CPT

## 2024-01-01 PROCEDURE — 74177 CT ABD & PELVIS W/CONTRAST: CPT

## 2024-01-01 PROCEDURE — 83690 ASSAY OF LIPASE: CPT

## 2024-01-01 PROCEDURE — 99222 1ST HOSP IP/OBS MODERATE 55: CPT | Performed by: INTERNAL MEDICINE

## 2024-01-01 PROCEDURE — 83550 IRON BINDING TEST: CPT

## 2024-01-01 PROCEDURE — 84145 PROCALCITONIN (PCT): CPT

## 2024-01-01 PROCEDURE — 2140000000 HC CCU INTERMEDIATE R&B

## 2024-01-01 PROCEDURE — 93005 ELECTROCARDIOGRAM TRACING: CPT | Performed by: EMERGENCY MEDICINE

## 2024-01-01 PROCEDURE — 89051 BODY FLUID CELL COUNT: CPT

## 2024-01-01 PROCEDURE — 84100 ASSAY OF PHOSPHORUS: CPT

## 2024-01-01 PROCEDURE — 6370000000 HC RX 637 (ALT 250 FOR IP): Performed by: PHYSICIAN ASSISTANT

## 2024-01-01 PROCEDURE — 96375 TX/PRO/DX INJ NEW DRUG ADDON: CPT

## 2024-01-01 PROCEDURE — 80076 HEPATIC FUNCTION PANEL: CPT

## 2024-01-01 PROCEDURE — 93005 ELECTROCARDIOGRAM TRACING: CPT | Performed by: NURSE PRACTITIONER

## 2024-01-01 PROCEDURE — 87205 SMEAR GRAM STAIN: CPT

## 2024-01-01 PROCEDURE — 96365 THER/PROPH/DIAG IV INF INIT: CPT

## 2024-01-01 PROCEDURE — 0BH17EZ INSERTION OF ENDOTRACHEAL AIRWAY INTO TRACHEA, VIA NATURAL OR ARTIFICIAL OPENING: ICD-10-PCS | Performed by: STUDENT IN AN ORGANIZED HEALTH CARE EDUCATION/TRAINING PROGRAM

## 2024-01-01 PROCEDURE — 88112 CYTOPATH CELL ENHANCE TECH: CPT

## 2024-01-01 PROCEDURE — 81001 URINALYSIS AUTO W/SCOPE: CPT

## 2024-01-01 PROCEDURE — 94669 MECHANICAL CHEST WALL OSCILL: CPT

## 2024-01-01 PROCEDURE — 82728 ASSAY OF FERRITIN: CPT

## 2024-01-01 PROCEDURE — 2500000003 HC RX 250 WO HCPCS: Performed by: STUDENT IN AN ORGANIZED HEALTH CARE EDUCATION/TRAINING PROGRAM

## 2024-01-01 PROCEDURE — 82150 ASSAY OF AMYLASE: CPT

## 2024-01-01 PROCEDURE — 82435 ASSAY OF BLOOD CHLORIDE: CPT

## 2024-01-01 PROCEDURE — 84157 ASSAY OF PROTEIN OTHER: CPT

## 2024-01-01 PROCEDURE — 2060000000 HC ICU INTERMEDIATE R&B

## 2024-01-01 PROCEDURE — 36600 WITHDRAWAL OF ARTERIAL BLOOD: CPT

## 2024-01-01 PROCEDURE — 99285 EMERGENCY DEPT VISIT HI MDM: CPT

## 2024-01-01 PROCEDURE — 31500 INSERT EMERGENCY AIRWAY: CPT | Performed by: STUDENT IN AN ORGANIZED HEALTH CARE EDUCATION/TRAINING PROGRAM

## 2024-01-01 PROCEDURE — 36415 COLL VENOUS BLD VENIPUNCTURE: CPT

## 2024-01-01 PROCEDURE — 71046 X-RAY EXAM CHEST 2 VIEWS: CPT

## 2024-01-01 PROCEDURE — 6360000004 HC RX CONTRAST MEDICATION: Performed by: EMERGENCY MEDICINE

## 2024-01-01 PROCEDURE — 85610 PROTHROMBIN TIME: CPT

## 2024-01-01 PROCEDURE — 49083 ABD PARACENTESIS W/IMAGING: CPT

## 2024-01-01 PROCEDURE — 80307 DRUG TEST PRSMV CHEM ANLYZR: CPT

## 2024-01-01 PROCEDURE — 84443 ASSAY THYROID STIM HORMONE: CPT

## 2024-01-01 PROCEDURE — 82533 TOTAL CORTISOL: CPT

## 2024-01-01 PROCEDURE — 87631 RESP VIRUS 3-5 TARGETS: CPT

## 2024-01-01 PROCEDURE — 88108 CYTOPATH CONCENTRATE TECH: CPT

## 2024-01-01 PROCEDURE — 76775 US EXAM ABDO BACK WALL LIM: CPT

## 2024-01-01 PROCEDURE — 84439 ASSAY OF FREE THYROXINE: CPT

## 2024-01-01 PROCEDURE — 87581 M.PNEUMON DNA AMP PROBE: CPT

## 2024-01-01 PROCEDURE — 87541 LEGION PNEUMO DNA AMP PROB: CPT

## 2024-01-01 PROCEDURE — 82042 OTHER SOURCE ALBUMIN QUAN EA: CPT

## 2024-01-01 PROCEDURE — 85027 COMPLETE CBC AUTOMATED: CPT

## 2024-01-01 PROCEDURE — 86140 C-REACTIVE PROTEIN: CPT

## 2024-01-01 RX ORDER — POTASSIUM CHLORIDE 7.45 MG/ML
10 INJECTION INTRAVENOUS
Status: COMPLETED | OUTPATIENT
Start: 2024-01-01 | End: 2024-01-01

## 2024-01-01 RX ORDER — EPINEPHRINE IN SOD CHLOR,ISO 1 MG/10 ML
SYRINGE (ML) INTRAVENOUS
Status: COMPLETED | OUTPATIENT
Start: 2024-01-01 | End: 2024-01-01

## 2024-01-01 RX ORDER — PREDNISONE 1 MG/1
3 TABLET ORAL DAILY
Status: DISCONTINUED | OUTPATIENT
Start: 2024-01-01 | End: 2024-01-01

## 2024-01-01 RX ORDER — HEPARIN SODIUM 10000 [USP'U]/100ML
5-30 INJECTION, SOLUTION INTRAVENOUS CONTINUOUS
Status: DISCONTINUED | OUTPATIENT
Start: 2024-01-01 | End: 2024-06-13 | Stop reason: HOSPADM

## 2024-01-01 RX ORDER — SODIUM CHLORIDE 9 MG/ML
INJECTION, SOLUTION INTRAVENOUS PRN
Status: DISCONTINUED | OUTPATIENT
Start: 2024-01-01 | End: 2024-01-01 | Stop reason: SDUPTHER

## 2024-01-01 RX ORDER — SODIUM CHLORIDE 0.9 % (FLUSH) 0.9 %
5-40 SYRINGE (ML) INJECTION EVERY 12 HOURS SCHEDULED
Status: DISCONTINUED | OUTPATIENT
Start: 2024-01-01 | End: 2024-06-13 | Stop reason: HOSPADM

## 2024-01-01 RX ORDER — SPIRONOLACTONE 25 MG/1
25 TABLET ORAL ONCE
Status: COMPLETED | OUTPATIENT
Start: 2024-01-01 | End: 2024-01-01

## 2024-01-01 RX ORDER — BUMETANIDE 0.25 MG/ML
1 INJECTION INTRAMUSCULAR; INTRAVENOUS 2 TIMES DAILY
Status: DISCONTINUED | OUTPATIENT
Start: 2024-01-01 | End: 2024-06-13 | Stop reason: HOSPADM

## 2024-01-01 RX ORDER — MIDODRINE HYDROCHLORIDE 2.5 MG/1
2.5 TABLET ORAL
Status: DISCONTINUED | OUTPATIENT
Start: 2024-01-01 | End: 2024-01-01

## 2024-01-01 RX ORDER — SODIUM CHLORIDE, SODIUM LACTATE, POTASSIUM CHLORIDE, CALCIUM CHLORIDE 600; 310; 30; 20 MG/100ML; MG/100ML; MG/100ML; MG/100ML
INJECTION, SOLUTION INTRAVENOUS CONTINUOUS
Status: DISCONTINUED | OUTPATIENT
Start: 2024-01-01 | End: 2024-01-01

## 2024-01-01 RX ORDER — DRONABINOL 5 MG/1
5 CAPSULE ORAL 2 TIMES DAILY
Status: DISCONTINUED | OUTPATIENT
Start: 2024-01-01 | End: 2024-06-13 | Stop reason: HOSPADM

## 2024-01-01 RX ORDER — ALBUMIN (HUMAN) 12.5 G/50ML
25 SOLUTION INTRAVENOUS EVERY 6 HOURS
Status: COMPLETED | OUTPATIENT
Start: 2024-01-01 | End: 2024-01-01

## 2024-01-01 RX ORDER — INSULIN LISPRO 100 [IU]/ML
0-4 INJECTION, SOLUTION INTRAVENOUS; SUBCUTANEOUS NIGHTLY
Status: DISCONTINUED | OUTPATIENT
Start: 2024-01-01 | End: 2024-01-01

## 2024-01-01 RX ORDER — BENZONATATE 100 MG/1
100 CAPSULE ORAL 3 TIMES DAILY PRN
Status: DISCONTINUED | OUTPATIENT
Start: 2024-01-01 | End: 2024-06-13 | Stop reason: HOSPADM

## 2024-01-01 RX ORDER — 0.9 % SODIUM CHLORIDE 0.9 %
1000 INTRAVENOUS SOLUTION INTRAVENOUS ONCE
Status: COMPLETED | OUTPATIENT
Start: 2024-01-01 | End: 2024-01-01

## 2024-01-01 RX ORDER — MEGESTROL ACETATE 40 MG/ML
200 SUSPENSION ORAL DAILY
Status: DISCONTINUED | OUTPATIENT
Start: 2024-01-01 | End: 2024-01-01

## 2024-01-01 RX ORDER — SODIUM CHLORIDE 0.9 % (FLUSH) 0.9 %
5-40 SYRINGE (ML) INJECTION PRN
Status: DISCONTINUED | OUTPATIENT
Start: 2024-01-01 | End: 2024-06-13 | Stop reason: HOSPADM

## 2024-01-01 RX ORDER — INSULIN LISPRO 100 [IU]/ML
0-4 INJECTION, SOLUTION INTRAVENOUS; SUBCUTANEOUS
Status: DISCONTINUED | OUTPATIENT
Start: 2024-01-01 | End: 2024-01-01

## 2024-01-01 RX ORDER — ONDANSETRON 4 MG/1
4 TABLET, ORALLY DISINTEGRATING ORAL EVERY 8 HOURS PRN
Status: DISCONTINUED | OUTPATIENT
Start: 2024-01-01 | End: 2024-01-01 | Stop reason: SDUPTHER

## 2024-01-01 RX ORDER — LANOLIN ALCOHOL/MO/W.PET/CERES
3 CREAM (GRAM) TOPICAL NIGHTLY PRN
Status: DISCONTINUED | OUTPATIENT
Start: 2024-01-01 | End: 2024-06-13 | Stop reason: HOSPADM

## 2024-01-01 RX ORDER — MAGNESIUM SULFATE IN WATER 40 MG/ML
2000 INJECTION, SOLUTION INTRAVENOUS PRN
Status: DISCONTINUED | OUTPATIENT
Start: 2024-01-01 | End: 2024-06-13 | Stop reason: HOSPADM

## 2024-01-01 RX ORDER — OXYCODONE HYDROCHLORIDE 5 MG/1
10 TABLET ORAL ONCE
Status: COMPLETED | OUTPATIENT
Start: 2024-01-01 | End: 2024-01-01

## 2024-01-01 RX ORDER — ONDANSETRON 2 MG/ML
4 INJECTION INTRAMUSCULAR; INTRAVENOUS ONCE
Status: COMPLETED | OUTPATIENT
Start: 2024-01-01 | End: 2024-01-01

## 2024-01-01 RX ORDER — HEPARIN SODIUM 1000 [USP'U]/ML
80 INJECTION, SOLUTION INTRAVENOUS; SUBCUTANEOUS PRN
Status: DISCONTINUED | OUTPATIENT
Start: 2024-01-01 | End: 2024-06-13 | Stop reason: HOSPADM

## 2024-01-01 RX ORDER — ONDANSETRON 4 MG/1
4 TABLET, ORALLY DISINTEGRATING ORAL EVERY 8 HOURS PRN
Status: DISCONTINUED | OUTPATIENT
Start: 2024-01-01 | End: 2024-06-13 | Stop reason: HOSPADM

## 2024-01-01 RX ORDER — SODIUM CHLORIDE 0.9 % (FLUSH) 0.9 %
5-40 SYRINGE (ML) INJECTION PRN
Status: DISCONTINUED | OUTPATIENT
Start: 2024-01-01 | End: 2024-01-01 | Stop reason: SDUPTHER

## 2024-01-01 RX ORDER — ACETAMINOPHEN 325 MG/1
650 TABLET ORAL EVERY 6 HOURS PRN
Status: DISCONTINUED | OUTPATIENT
Start: 2024-01-01 | End: 2024-06-13 | Stop reason: HOSPADM

## 2024-01-01 RX ORDER — HEPARIN SODIUM 1000 [USP'U]/ML
80 INJECTION, SOLUTION INTRAVENOUS; SUBCUTANEOUS ONCE
Status: COMPLETED | OUTPATIENT
Start: 2024-01-01 | End: 2024-01-01

## 2024-01-01 RX ORDER — MIDODRINE HYDROCHLORIDE 10 MG/1
10 TABLET ORAL
Status: DISCONTINUED | OUTPATIENT
Start: 2024-01-01 | End: 2024-01-01

## 2024-01-01 RX ORDER — POTASSIUM CHLORIDE 7.45 MG/ML
10 INJECTION INTRAVENOUS PRN
Status: DISCONTINUED | OUTPATIENT
Start: 2024-01-01 | End: 2024-06-13 | Stop reason: HOSPADM

## 2024-01-01 RX ORDER — DRONABINOL 2.5 MG/1
2.5 CAPSULE ORAL 2 TIMES DAILY
Status: DISCONTINUED | OUTPATIENT
Start: 2024-01-01 | End: 2024-01-01

## 2024-01-01 RX ORDER — FUROSEMIDE 10 MG/ML
40 INJECTION INTRAMUSCULAR; INTRAVENOUS EVERY 12 HOURS
Status: COMPLETED | OUTPATIENT
Start: 2024-01-01 | End: 2024-01-01

## 2024-01-01 RX ORDER — PANTOPRAZOLE SODIUM 40 MG/1
40 TABLET, DELAYED RELEASE ORAL
Status: DISCONTINUED | OUTPATIENT
Start: 2024-01-01 | End: 2024-06-13 | Stop reason: HOSPADM

## 2024-01-01 RX ORDER — BUPRENORPHINE 10 UG/H
1 PATCH TRANSDERMAL WEEKLY
Status: DISCONTINUED | OUTPATIENT
Start: 2024-01-01 | End: 2024-06-13 | Stop reason: HOSPADM

## 2024-01-01 RX ORDER — GLUCAGON 1 MG/ML
1 KIT INJECTION PRN
Status: DISCONTINUED | OUTPATIENT
Start: 2024-01-01 | End: 2024-06-13 | Stop reason: HOSPADM

## 2024-01-01 RX ORDER — POTASSIUM CHLORIDE 20 MEQ/1
40 TABLET, EXTENDED RELEASE ORAL PRN
Status: DISCONTINUED | OUTPATIENT
Start: 2024-01-01 | End: 2024-06-13 | Stop reason: HOSPADM

## 2024-01-01 RX ORDER — SODIUM CHLORIDE 9 MG/ML
INJECTION, SOLUTION INTRAVENOUS PRN
Status: DISCONTINUED | OUTPATIENT
Start: 2024-01-01 | End: 2024-06-13 | Stop reason: HOSPADM

## 2024-01-01 RX ORDER — ONDANSETRON 2 MG/ML
4 INJECTION INTRAMUSCULAR; INTRAVENOUS EVERY 6 HOURS PRN
Status: DISCONTINUED | OUTPATIENT
Start: 2024-01-01 | End: 2024-06-13 | Stop reason: HOSPADM

## 2024-01-01 RX ORDER — FUROSEMIDE 10 MG/ML
40 INJECTION INTRAMUSCULAR; INTRAVENOUS ONCE
Status: COMPLETED | OUTPATIENT
Start: 2024-01-01 | End: 2024-01-01

## 2024-01-01 RX ORDER — SODIUM CHLORIDE 0.9 % (FLUSH) 0.9 %
5-40 SYRINGE (ML) INJECTION EVERY 12 HOURS SCHEDULED
Status: DISCONTINUED | OUTPATIENT
Start: 2024-01-01 | End: 2024-01-01 | Stop reason: SDUPTHER

## 2024-01-01 RX ORDER — FUROSEMIDE 20 MG/1
20 TABLET ORAL DAILY
Status: DISCONTINUED | OUTPATIENT
Start: 2024-01-01 | End: 2024-06-13 | Stop reason: HOSPADM

## 2024-01-01 RX ORDER — ONDANSETRON 2 MG/ML
4 INJECTION INTRAMUSCULAR; INTRAVENOUS EVERY 6 HOURS PRN
Status: DISCONTINUED | OUTPATIENT
Start: 2024-01-01 | End: 2024-01-01 | Stop reason: SDUPTHER

## 2024-01-01 RX ORDER — 0.9 % SODIUM CHLORIDE 0.9 %
30 INTRAVENOUS SOLUTION INTRAVENOUS ONCE
Status: COMPLETED | OUTPATIENT
Start: 2024-01-01 | End: 2024-01-01

## 2024-01-01 RX ORDER — FOLIC ACID 1 MG/1
2000 TABLET ORAL DAILY
Status: DISCONTINUED | OUTPATIENT
Start: 2024-01-01 | End: 2024-06-13 | Stop reason: HOSPADM

## 2024-01-01 RX ORDER — FERROUS SULFATE 325(65) MG
650 TABLET ORAL
Status: DISCONTINUED | OUTPATIENT
Start: 2024-01-01 | End: 2024-06-13 | Stop reason: HOSPADM

## 2024-01-01 RX ORDER — OXYCODONE HYDROCHLORIDE 5 MG/1
10 TABLET ORAL EVERY 6 HOURS PRN
Status: DISCONTINUED | OUTPATIENT
Start: 2024-01-01 | End: 2024-06-13 | Stop reason: HOSPADM

## 2024-01-01 RX ORDER — MORPHINE SULFATE 2 MG/ML
2 INJECTION, SOLUTION INTRAMUSCULAR; INTRAVENOUS EVERY 4 HOURS PRN
Status: DISCONTINUED | OUTPATIENT
Start: 2024-01-01 | End: 2024-06-13 | Stop reason: HOSPADM

## 2024-01-01 RX ORDER — BUPRENORPHINE 10 UG/H
1 PATCH TRANSDERMAL WEEKLY
COMMUNITY

## 2024-01-01 RX ORDER — 0.9 % SODIUM CHLORIDE 0.9 %
500 INTRAVENOUS SOLUTION INTRAVENOUS ONCE
Status: COMPLETED | OUTPATIENT
Start: 2024-01-01 | End: 2024-01-01

## 2024-01-01 RX ORDER — HEPARIN SODIUM 1000 [USP'U]/ML
40 INJECTION, SOLUTION INTRAVENOUS; SUBCUTANEOUS PRN
Status: DISCONTINUED | OUTPATIENT
Start: 2024-01-01 | End: 2024-06-13 | Stop reason: HOSPADM

## 2024-01-01 RX ORDER — DEXTROSE MONOHYDRATE 100 MG/ML
INJECTION, SOLUTION INTRAVENOUS CONTINUOUS PRN
Status: DISCONTINUED | OUTPATIENT
Start: 2024-01-01 | End: 2024-06-13 | Stop reason: HOSPADM

## 2024-01-01 RX ORDER — MIDODRINE HYDROCHLORIDE 5 MG/1
5 TABLET ORAL ONCE
Status: COMPLETED | OUTPATIENT
Start: 2024-01-01 | End: 2024-01-01

## 2024-01-01 RX ORDER — ACETAMINOPHEN 650 MG/1
650 SUPPOSITORY RECTAL EVERY 6 HOURS PRN
Status: DISCONTINUED | OUTPATIENT
Start: 2024-01-01 | End: 2024-06-13 | Stop reason: HOSPADM

## 2024-01-01 RX ORDER — MAGNESIUM SULFATE IN WATER 40 MG/ML
2000 INJECTION, SOLUTION INTRAVENOUS ONCE
Status: COMPLETED | OUTPATIENT
Start: 2024-01-01 | End: 2024-01-01

## 2024-01-01 RX ORDER — POLYETHYLENE GLYCOL 3350 17 G/17G
17 POWDER, FOR SOLUTION ORAL DAILY PRN
Status: DISCONTINUED | OUTPATIENT
Start: 2024-01-01 | End: 2024-06-13 | Stop reason: HOSPADM

## 2024-01-01 RX ADMIN — MIDODRINE HYDROCHLORIDE 15 MG: 10 TABLET ORAL at 18:52

## 2024-01-01 RX ADMIN — APIXABAN 5 MG: 5 TABLET, FILM COATED ORAL at 08:06

## 2024-01-01 RX ADMIN — FUROSEMIDE 40 MG: 10 INJECTION, SOLUTION INTRAMUSCULAR; INTRAVENOUS at 13:15

## 2024-01-01 RX ADMIN — PIPERACILLIN AND TAZOBACTAM 3375 MG: 3; .375 INJECTION, POWDER, FOR SOLUTION INTRAVENOUS at 13:41

## 2024-01-01 RX ADMIN — APIXABAN 5 MG: 5 TABLET, FILM COATED ORAL at 21:04

## 2024-01-01 RX ADMIN — SPIRONOLACTONE 25 MG: 25 TABLET ORAL at 09:43

## 2024-01-01 RX ADMIN — DRONABINOL 5 MG: 5 CAPSULE ORAL at 21:03

## 2024-01-01 RX ADMIN — MIDODRINE HYDROCHLORIDE 15 MG: 10 TABLET ORAL at 17:12

## 2024-01-01 RX ADMIN — PIPERACILLIN AND TAZOBACTAM 3375 MG: 3; .375 INJECTION, POWDER, FOR SOLUTION INTRAVENOUS at 02:50

## 2024-01-01 RX ADMIN — OXYCODONE 10 MG: 5 TABLET ORAL at 06:29

## 2024-01-01 RX ADMIN — MIDODRINE HYDROCHLORIDE 15 MG: 10 TABLET ORAL at 11:07

## 2024-01-01 RX ADMIN — MIDODRINE HYDROCHLORIDE 10 MG: 10 TABLET ORAL at 07:56

## 2024-01-01 RX ADMIN — PIPERACILLIN AND TAZOBACTAM 3375 MG: 3; .375 INJECTION, POWDER, FOR SOLUTION INTRAVENOUS at 21:30

## 2024-01-01 RX ADMIN — ACETAMINOPHEN 650 MG: 325 TABLET ORAL at 09:02

## 2024-01-01 RX ADMIN — FUROSEMIDE 20 MG: 20 TABLET ORAL at 09:43

## 2024-01-01 RX ADMIN — ONDANSETRON 4 MG: 2 INJECTION INTRAMUSCULAR; INTRAVENOUS at 21:16

## 2024-01-01 RX ADMIN — PANTOPRAZOLE SODIUM 40 MG: 40 TABLET, DELAYED RELEASE ORAL at 07:57

## 2024-01-01 RX ADMIN — FUROSEMIDE 40 MG: 10 INJECTION, SOLUTION INTRAMUSCULAR; INTRAVENOUS at 20:26

## 2024-01-01 RX ADMIN — SODIUM CHLORIDE, PRESERVATIVE FREE 10 ML: 5 INJECTION INTRAVENOUS at 20:28

## 2024-01-01 RX ADMIN — FERROUS SULFATE TAB 325 MG (65 MG ELEMENTAL FE) 650 MG: 325 (65 FE) TAB at 08:16

## 2024-01-01 RX ADMIN — SODIUM CHLORIDE, PRESERVATIVE FREE 10 ML: 5 INJECTION INTRAVENOUS at 21:13

## 2024-01-01 RX ADMIN — HEPARIN SODIUM 16 UNITS/KG/HR: 10000 INJECTION, SOLUTION INTRAVENOUS at 21:41

## 2024-01-01 RX ADMIN — BENZONATATE 100 MG: 100 CAPSULE ORAL at 06:24

## 2024-01-01 RX ADMIN — APIXABAN 5 MG: 5 TABLET, FILM COATED ORAL at 20:33

## 2024-01-01 RX ADMIN — IOPAMIDOL 80 ML: 755 INJECTION, SOLUTION INTRAVENOUS at 21:47

## 2024-01-01 RX ADMIN — POTASSIUM CHLORIDE 40 MEQ: 1500 TABLET, EXTENDED RELEASE ORAL at 06:33

## 2024-01-01 RX ADMIN — OXYCODONE 10 MG: 5 TABLET ORAL at 10:19

## 2024-01-01 RX ADMIN — SODIUM CHLORIDE 1000 ML: 9 INJECTION, SOLUTION INTRAVENOUS at 21:35

## 2024-01-01 RX ADMIN — MIDODRINE HYDROCHLORIDE 15 MG: 10 TABLET ORAL at 08:16

## 2024-01-01 RX ADMIN — FERROUS SULFATE TAB 325 MG (65 MG ELEMENTAL FE) 650 MG: 325 (65 FE) TAB at 08:46

## 2024-01-01 RX ADMIN — Medication 1 MG: at 22:52

## 2024-01-01 RX ADMIN — FERROUS SULFATE TAB 325 MG (65 MG ELEMENTAL FE) 650 MG: 325 (65 FE) TAB at 07:56

## 2024-01-01 RX ADMIN — NYSTATIN 500000 UNITS: 100000 SUSPENSION ORAL at 07:44

## 2024-01-01 RX ADMIN — BENZONATATE 100 MG: 100 CAPSULE ORAL at 14:23

## 2024-01-01 RX ADMIN — PIPERACILLIN AND TAZOBACTAM 3375 MG: 3; .375 INJECTION, POWDER, FOR SOLUTION INTRAVENOUS at 13:08

## 2024-01-01 RX ADMIN — MIDODRINE HYDROCHLORIDE 10 MG: 10 TABLET ORAL at 11:42

## 2024-01-01 RX ADMIN — BUMETANIDE 1 MG: 0.25 INJECTION INTRAMUSCULAR; INTRAVENOUS at 08:47

## 2024-01-01 RX ADMIN — NYSTATIN 500000 UNITS: 100000 SUSPENSION ORAL at 08:10

## 2024-01-01 RX ADMIN — BUMETANIDE 1 MG: 0.25 INJECTION INTRAMUSCULAR; INTRAVENOUS at 13:01

## 2024-01-01 RX ADMIN — HEPARIN SODIUM 4200 UNITS: 1000 INJECTION INTRAVENOUS; SUBCUTANEOUS at 21:03

## 2024-01-01 RX ADMIN — NYSTATIN 500000 UNITS: 100000 SUSPENSION ORAL at 16:58

## 2024-01-01 RX ADMIN — OXYCODONE 10 MG: 5 TABLET ORAL at 20:41

## 2024-01-01 RX ADMIN — Medication 50 MEQ: at 22:42

## 2024-01-01 RX ADMIN — VANCOMYCIN HYDROCHLORIDE 1250 MG: 5 INJECTION, POWDER, LYOPHILIZED, FOR SOLUTION INTRAVENOUS at 22:36

## 2024-01-01 RX ADMIN — PANTOPRAZOLE SODIUM 40 MG: 40 TABLET, DELAYED RELEASE ORAL at 05:54

## 2024-01-01 RX ADMIN — MIDODRINE HYDROCHLORIDE 2.5 MG: 2.5 TABLET ORAL at 15:19

## 2024-01-01 RX ADMIN — APIXABAN 5 MG: 5 TABLET, FILM COATED ORAL at 08:11

## 2024-01-01 RX ADMIN — POTASSIUM CHLORIDE 10 MEQ: 7.46 INJECTION, SOLUTION INTRAVENOUS at 11:39

## 2024-01-01 RX ADMIN — ACETAMINOPHEN 650 MG: 325 TABLET ORAL at 21:06

## 2024-01-01 RX ADMIN — NYSTATIN 500000 UNITS: 100000 SUSPENSION ORAL at 11:59

## 2024-01-01 RX ADMIN — PIPERACILLIN AND TAZOBACTAM 3375 MG: 3; .375 INJECTION, POWDER, FOR SOLUTION INTRAVENOUS at 12:55

## 2024-01-01 RX ADMIN — Medication 1 MG: at 23:00

## 2024-01-01 RX ADMIN — ALBUMIN (HUMAN) 25 G: 0.25 INJECTION, SOLUTION INTRAVENOUS at 22:20

## 2024-01-01 RX ADMIN — FERROUS SULFATE TAB 325 MG (65 MG ELEMENTAL FE) 650 MG: 325 (65 FE) TAB at 08:06

## 2024-01-01 RX ADMIN — DRONABINOL 5 MG: 5 CAPSULE ORAL at 21:39

## 2024-01-01 RX ADMIN — Medication 1 TABLET: at 07:58

## 2024-01-01 RX ADMIN — MIDODRINE HYDROCHLORIDE 15 MG: 10 TABLET ORAL at 16:19

## 2024-01-01 RX ADMIN — FOLIC ACID 2000 MCG: 1 TABLET ORAL at 08:11

## 2024-01-01 RX ADMIN — HEPARIN SODIUM 18 UNITS/KG/HR: 10000 INJECTION, SOLUTION INTRAVENOUS at 21:03

## 2024-01-01 RX ADMIN — NYSTATIN 500000 UNITS: 100000 SUSPENSION ORAL at 17:12

## 2024-01-01 RX ADMIN — Medication 1 TABLET: at 08:11

## 2024-01-01 RX ADMIN — BENZONATATE 100 MG: 100 CAPSULE ORAL at 12:56

## 2024-01-01 RX ADMIN — PIPERACILLIN AND TAZOBACTAM 3375 MG: 3; .375 INJECTION, POWDER, FOR SOLUTION INTRAVENOUS at 11:42

## 2024-01-01 RX ADMIN — FOLIC ACID 2000 MCG: 1 TABLET ORAL at 08:49

## 2024-01-01 RX ADMIN — POTASSIUM CHLORIDE 10 MEQ: 7.46 INJECTION, SOLUTION INTRAVENOUS at 12:28

## 2024-01-01 RX ADMIN — NYSTATIN 500000 UNITS: 100000 SUSPENSION ORAL at 20:08

## 2024-01-01 RX ADMIN — BENZONATATE 100 MG: 100 CAPSULE ORAL at 21:04

## 2024-01-01 RX ADMIN — SODIUM CHLORIDE, PRESERVATIVE FREE 10 ML: 5 INJECTION INTRAVENOUS at 08:48

## 2024-01-01 RX ADMIN — Medication 1 MG: at 22:32

## 2024-01-01 RX ADMIN — BENZONATATE 100 MG: 100 CAPSULE ORAL at 07:32

## 2024-01-01 RX ADMIN — FUROSEMIDE 40 MG: 10 INJECTION, SOLUTION INTRAMUSCULAR; INTRAVENOUS at 18:51

## 2024-01-01 RX ADMIN — POTASSIUM CHLORIDE 10 MEQ: 7.46 INJECTION, SOLUTION INTRAVENOUS at 08:16

## 2024-01-01 RX ADMIN — PANTOPRAZOLE SODIUM 40 MG: 40 TABLET, DELAYED RELEASE ORAL at 06:03

## 2024-01-01 RX ADMIN — BUMETANIDE 1 MG: 0.25 INJECTION INTRAMUSCULAR; INTRAVENOUS at 19:43

## 2024-01-01 RX ADMIN — ACETAMINOPHEN 650 MG: 325 TABLET ORAL at 08:46

## 2024-01-01 RX ADMIN — ONDANSETRON 4 MG: 2 INJECTION INTRAMUSCULAR; INTRAVENOUS at 22:01

## 2024-01-01 RX ADMIN — POLYETHYLENE GLYCOL 3350 17 G: 17 POWDER, FOR SOLUTION ORAL at 08:10

## 2024-01-01 RX ADMIN — Medication 1 MG: at 22:35

## 2024-01-01 RX ADMIN — PIPERACILLIN AND TAZOBACTAM 3375 MG: 3; .375 INJECTION, POWDER, FOR SOLUTION INTRAVENOUS at 12:30

## 2024-01-01 RX ADMIN — BENZONATATE 100 MG: 100 CAPSULE ORAL at 22:57

## 2024-01-01 RX ADMIN — APIXABAN 5 MG: 5 TABLET, FILM COATED ORAL at 20:06

## 2024-01-01 RX ADMIN — DRONABINOL 5 MG: 5 CAPSULE ORAL at 08:16

## 2024-01-01 RX ADMIN — MIDODRINE HYDROCHLORIDE 10 MG: 10 TABLET ORAL at 07:40

## 2024-01-01 RX ADMIN — DRONABINOL 5 MG: 5 CAPSULE ORAL at 22:59

## 2024-01-01 RX ADMIN — PANTOPRAZOLE SODIUM 40 MG: 40 TABLET, DELAYED RELEASE ORAL at 06:01

## 2024-01-01 RX ADMIN — Medication 1 TABLET: at 09:44

## 2024-01-01 RX ADMIN — OXYCODONE 10 MG: 5 TABLET ORAL at 22:03

## 2024-01-01 RX ADMIN — DRONABINOL 5 MG: 5 CAPSULE ORAL at 15:20

## 2024-01-01 RX ADMIN — SODIUM CHLORIDE 500 ML: 9 INJECTION, SOLUTION INTRAVENOUS at 14:05

## 2024-01-01 RX ADMIN — ACETAMINOPHEN 650 MG: 325 TABLET ORAL at 14:41

## 2024-01-01 RX ADMIN — ONDANSETRON 4 MG: 4 TABLET, ORALLY DISINTEGRATING ORAL at 21:11

## 2024-01-01 RX ADMIN — SODIUM CHLORIDE, PRESERVATIVE FREE 10 ML: 5 INJECTION INTRAVENOUS at 20:07

## 2024-01-01 RX ADMIN — BENZONATATE 100 MG: 100 CAPSULE ORAL at 20:42

## 2024-01-01 RX ADMIN — ONDANSETRON 4 MG: 4 TABLET, ORALLY DISINTEGRATING ORAL at 12:19

## 2024-01-01 RX ADMIN — POTASSIUM CHLORIDE 40 MEQ: 1500 TABLET, EXTENDED RELEASE ORAL at 21:15

## 2024-01-01 RX ADMIN — HYDROCORTISONE SODIUM SUCCINATE 50 MG: 100 INJECTION, POWDER, FOR SOLUTION INTRAMUSCULAR; INTRAVENOUS at 15:20

## 2024-01-01 RX ADMIN — Medication 1 TABLET: at 08:48

## 2024-01-01 RX ADMIN — MAGNESIUM SULFATE HEPTAHYDRATE 2000 MG: 40 INJECTION, SOLUTION INTRAVENOUS at 11:14

## 2024-01-01 RX ADMIN — PIPERACILLIN AND TAZOBACTAM 3375 MG: 3; .375 INJECTION, POWDER, FOR SOLUTION INTRAVENOUS at 20:12

## 2024-01-01 RX ADMIN — SODIUM CHLORIDE, POTASSIUM CHLORIDE, SODIUM LACTATE AND CALCIUM CHLORIDE: 600; 310; 30; 20 INJECTION, SOLUTION INTRAVENOUS at 04:34

## 2024-01-01 RX ADMIN — APIXABAN 5 MG: 5 TABLET, FILM COATED ORAL at 08:16

## 2024-01-01 RX ADMIN — PIPERACILLIN AND TAZOBACTAM 3375 MG: 3; .375 INJECTION, POWDER, FOR SOLUTION INTRAVENOUS at 20:33

## 2024-01-01 RX ADMIN — SODIUM CHLORIDE, PRESERVATIVE FREE 10 ML: 5 INJECTION INTRAVENOUS at 08:52

## 2024-01-01 RX ADMIN — NYSTATIN 500000 UNITS: 100000 SUSPENSION ORAL at 12:21

## 2024-01-01 RX ADMIN — PANTOPRAZOLE SODIUM 40 MG: 40 TABLET, DELAYED RELEASE ORAL at 05:30

## 2024-01-01 RX ADMIN — PIPERACILLIN AND TAZOBACTAM 3375 MG: 3; .375 INJECTION, POWDER, FOR SOLUTION INTRAVENOUS at 21:10

## 2024-01-01 RX ADMIN — PIPERACILLIN AND TAZOBACTAM 3375 MG: 3; .375 INJECTION, POWDER, FOR SOLUTION INTRAVENOUS at 04:32

## 2024-01-01 RX ADMIN — POTASSIUM CHLORIDE 10 MEQ: 7.46 INJECTION, SOLUTION INTRAVENOUS at 11:49

## 2024-01-01 RX ADMIN — PIPERACILLIN AND TAZOBACTAM 3375 MG: 3; .375 INJECTION, POWDER, FOR SOLUTION INTRAVENOUS at 21:09

## 2024-01-01 RX ADMIN — PIPERACILLIN AND TAZOBACTAM 3375 MG: 3; .375 INJECTION, POWDER, FOR SOLUTION INTRAVENOUS at 04:45

## 2024-01-01 RX ADMIN — PIPERACILLIN AND TAZOBACTAM 3375 MG: 3; .375 INJECTION, POWDER, FOR SOLUTION INTRAVENOUS at 05:30

## 2024-01-01 RX ADMIN — NYSTATIN 500000 UNITS: 100000 SUSPENSION ORAL at 21:03

## 2024-01-01 RX ADMIN — FOLIC ACID 2000 MCG: 1 TABLET ORAL at 08:16

## 2024-01-01 RX ADMIN — DRONABINOL 5 MG: 5 CAPSULE ORAL at 20:42

## 2024-01-01 RX ADMIN — PREDNISONE 3 MG: 1 TABLET ORAL at 08:11

## 2024-01-01 RX ADMIN — Medication 1 MG: at 22:39

## 2024-01-01 RX ADMIN — MIDODRINE HYDROCHLORIDE 5 MG: 5 TABLET ORAL at 17:15

## 2024-01-01 RX ADMIN — NYSTATIN 500000 UNITS: 100000 SUSPENSION ORAL at 22:36

## 2024-01-01 RX ADMIN — Medication 1 MG: at 22:49

## 2024-01-01 RX ADMIN — OXYCODONE 10 MG: 5 TABLET ORAL at 12:17

## 2024-01-01 RX ADMIN — BENZONATATE 100 MG: 100 CAPSULE ORAL at 07:55

## 2024-01-01 RX ADMIN — FOLIC ACID 2000 MCG: 1 TABLET ORAL at 08:06

## 2024-01-01 RX ADMIN — PANTOPRAZOLE SODIUM 40 MG: 40 TABLET, DELAYED RELEASE ORAL at 07:32

## 2024-01-01 RX ADMIN — Medication 1 TABLET: at 08:05

## 2024-01-01 RX ADMIN — ACETAMINOPHEN 650 MG: 325 TABLET ORAL at 11:44

## 2024-01-01 RX ADMIN — PREDNISONE 3 MG: 1 TABLET ORAL at 11:34

## 2024-01-01 RX ADMIN — APIXABAN 5 MG: 5 TABLET, FILM COATED ORAL at 07:57

## 2024-01-01 RX ADMIN — FERROUS SULFATE TAB 325 MG (65 MG ELEMENTAL FE) 650 MG: 325 (65 FE) TAB at 07:41

## 2024-01-01 RX ADMIN — FERROUS SULFATE TAB 325 MG (65 MG ELEMENTAL FE) 650 MG: 325 (65 FE) TAB at 08:48

## 2024-01-01 RX ADMIN — PIPERACILLIN AND TAZOBACTAM 3375 MG: 3; .375 INJECTION, POWDER, FOR SOLUTION INTRAVENOUS at 20:47

## 2024-01-01 RX ADMIN — OXYCODONE 10 MG: 5 TABLET ORAL at 20:44

## 2024-01-01 RX ADMIN — SODIUM CHLORIDE, PRESERVATIVE FREE 10 ML: 5 INJECTION INTRAVENOUS at 21:03

## 2024-01-01 RX ADMIN — MIDODRINE HYDROCHLORIDE 15 MG: 10 TABLET ORAL at 11:46

## 2024-01-01 RX ADMIN — APIXABAN 10 MG: 5 TABLET, FILM COATED ORAL at 02:49

## 2024-01-01 RX ADMIN — FUROSEMIDE 20 MG: 20 TABLET ORAL at 08:12

## 2024-01-01 RX ADMIN — APIXABAN 5 MG: 5 TABLET, FILM COATED ORAL at 20:43

## 2024-01-01 RX ADMIN — MIDODRINE HYDROCHLORIDE 10 MG: 10 TABLET ORAL at 11:16

## 2024-01-01 RX ADMIN — Medication 50 MEQ: at 22:40

## 2024-01-01 RX ADMIN — PIPERACILLIN AND TAZOBACTAM 3375 MG: 3; .375 INJECTION, POWDER, FOR SOLUTION INTRAVENOUS at 11:59

## 2024-01-01 RX ADMIN — SODIUM CHLORIDE, PRESERVATIVE FREE 10 ML: 5 INJECTION INTRAVENOUS at 21:41

## 2024-01-01 RX ADMIN — PIPERACILLIN AND TAZOBACTAM 3375 MG: 3; .375 INJECTION, POWDER, FOR SOLUTION INTRAVENOUS at 13:21

## 2024-01-01 RX ADMIN — PREDNISONE 3 MG: 1 TABLET ORAL at 09:44

## 2024-01-01 RX ADMIN — FERROUS SULFATE TAB 325 MG (65 MG ELEMENTAL FE) 650 MG: 325 (65 FE) TAB at 08:12

## 2024-01-01 RX ADMIN — PIPERACILLIN AND TAZOBACTAM 3375 MG: 3; .375 INJECTION, POWDER, FOR SOLUTION INTRAVENOUS at 05:18

## 2024-01-01 RX ADMIN — PIPERACILLIN AND TAZOBACTAM 3375 MG: 3; .375 INJECTION, POWDER, FOR SOLUTION INTRAVENOUS at 06:17

## 2024-01-01 RX ADMIN — FOLIC ACID 2000 MCG: 1 TABLET ORAL at 08:46

## 2024-01-01 RX ADMIN — NYSTATIN 500000 UNITS: 100000 SUSPENSION ORAL at 07:59

## 2024-01-01 RX ADMIN — OXYCODONE 10 MG: 5 TABLET ORAL at 17:05

## 2024-01-01 RX ADMIN — SODIUM CHLORIDE 1000 ML: 9 INJECTION, SOLUTION INTRAVENOUS at 14:40

## 2024-01-01 RX ADMIN — DRONABINOL 5 MG: 5 CAPSULE ORAL at 07:49

## 2024-01-01 RX ADMIN — SODIUM PHOSPHATE, MONOBASIC, MONOHYDRATE AND SODIUM PHOSPHATE, DIBASIC, ANHYDROUS 15 MMOL: 142; 276 INJECTION, SOLUTION INTRAVENOUS at 08:19

## 2024-01-01 RX ADMIN — OXYCODONE 10 MG: 5 TABLET ORAL at 21:32

## 2024-01-01 RX ADMIN — APIXABAN 10 MG: 5 TABLET, FILM COATED ORAL at 09:43

## 2024-01-01 RX ADMIN — ONDANSETRON 4 MG: 2 INJECTION INTRAMUSCULAR; INTRAVENOUS at 13:37

## 2024-01-01 RX ADMIN — SODIUM CHLORIDE 500 ML: 9 INJECTION, SOLUTION INTRAVENOUS at 12:21

## 2024-01-01 RX ADMIN — Medication 1 TABLET: at 07:42

## 2024-01-01 RX ADMIN — MIDODRINE HYDROCHLORIDE 15 MG: 10 TABLET ORAL at 11:59

## 2024-01-01 RX ADMIN — SODIUM CHLORIDE, PRESERVATIVE FREE 10 ML: 5 INJECTION INTRAVENOUS at 09:50

## 2024-01-01 RX ADMIN — POTASSIUM CHLORIDE 10 MEQ: 7.46 INJECTION, SOLUTION INTRAVENOUS at 10:36

## 2024-01-01 RX ADMIN — SODIUM CHLORIDE, PRESERVATIVE FREE 10 ML: 5 INJECTION INTRAVENOUS at 07:43

## 2024-01-01 RX ADMIN — DRONABINOL 5 MG: 5 CAPSULE ORAL at 08:10

## 2024-01-01 RX ADMIN — ACETAMINOPHEN 650 MG: 325 TABLET ORAL at 20:05

## 2024-01-01 RX ADMIN — SODIUM CHLORIDE 1000 ML: 9 INJECTION, SOLUTION INTRAVENOUS at 16:05

## 2024-01-01 RX ADMIN — POTASSIUM CHLORIDE 10 MEQ: 7.46 INJECTION, SOLUTION INTRAVENOUS at 05:54

## 2024-01-01 RX ADMIN — FUROSEMIDE 40 MG: 10 INJECTION, SOLUTION INTRAMUSCULAR; INTRAVENOUS at 09:49

## 2024-01-01 RX ADMIN — PANTOPRAZOLE SODIUM 40 MG: 40 TABLET, DELAYED RELEASE ORAL at 06:29

## 2024-01-01 RX ADMIN — NYSTATIN 500000 UNITS: 100000 SUSPENSION ORAL at 17:00

## 2024-01-01 RX ADMIN — FOLIC ACID 2000 MCG: 1 TABLET ORAL at 07:54

## 2024-01-01 RX ADMIN — NYSTATIN 500000 UNITS: 100000 SUSPENSION ORAL at 16:55

## 2024-01-01 RX ADMIN — PIPERACILLIN AND TAZOBACTAM 3375 MG: 3; .375 INJECTION, POWDER, FOR SOLUTION INTRAVENOUS at 04:35

## 2024-01-01 RX ADMIN — MIDODRINE HYDROCHLORIDE 15 MG: 10 TABLET ORAL at 16:55

## 2024-01-01 RX ADMIN — SODIUM CHLORIDE, PRESERVATIVE FREE 10 ML: 5 INJECTION INTRAVENOUS at 07:58

## 2024-01-01 RX ADMIN — APIXABAN 5 MG: 5 TABLET, FILM COATED ORAL at 22:39

## 2024-01-01 RX ADMIN — ALBUMIN (HUMAN) 25 G: 0.25 INJECTION, SOLUTION INTRAVENOUS at 04:40

## 2024-01-01 RX ADMIN — Medication 50 MEQ: at 22:38

## 2024-01-01 RX ADMIN — ONDANSETRON 4 MG: 4 TABLET, ORALLY DISINTEGRATING ORAL at 11:18

## 2024-01-01 RX ADMIN — Medication 50 MEQ: at 22:45

## 2024-01-01 RX ADMIN — POLYETHYLENE GLYCOL 3350 17 G: 17 POWDER, FOR SOLUTION ORAL at 08:17

## 2024-01-01 RX ADMIN — APIXABAN 5 MG: 5 TABLET, FILM COATED ORAL at 08:52

## 2024-01-01 RX ADMIN — MIDODRINE HYDROCHLORIDE 15 MG: 10 TABLET ORAL at 08:48

## 2024-01-01 RX ADMIN — NYSTATIN 500000 UNITS: 100000 SUSPENSION ORAL at 13:41

## 2024-01-01 RX ADMIN — NYSTATIN 500000 UNITS: 100000 SUSPENSION ORAL at 20:43

## 2024-01-01 RX ADMIN — NYSTATIN 500000 UNITS: 100000 SUSPENSION ORAL at 08:05

## 2024-01-01 RX ADMIN — NYSTATIN 500000 UNITS: 100000 SUSPENSION ORAL at 20:33

## 2024-01-01 RX ADMIN — POTASSIUM CHLORIDE 10 MEQ: 7.46 INJECTION, SOLUTION INTRAVENOUS at 06:47

## 2024-01-01 RX ADMIN — Medication 3 MG: at 20:47

## 2024-01-01 RX ADMIN — DRONABINOL 5 MG: 5 CAPSULE ORAL at 23:50

## 2024-01-01 RX ADMIN — ACETAMINOPHEN 650 MG: 325 TABLET ORAL at 23:52

## 2024-01-01 RX ADMIN — MIDODRINE HYDROCHLORIDE 10 MG: 10 TABLET ORAL at 16:59

## 2024-01-01 RX ADMIN — PIPERACILLIN AND TAZOBACTAM 4500 MG: 4; .5 INJECTION, POWDER, LYOPHILIZED, FOR SOLUTION INTRAVENOUS at 21:33

## 2024-01-01 RX ADMIN — Medication 1 MG: at 22:43

## 2024-01-01 RX ADMIN — POTASSIUM CHLORIDE 40 MEQ: 1500 TABLET, EXTENDED RELEASE ORAL at 10:42

## 2024-01-01 RX ADMIN — ACETAMINOPHEN 650 MG: 325 TABLET ORAL at 22:10

## 2024-01-01 RX ADMIN — Medication 1 TABLET: at 08:47

## 2024-01-01 RX ADMIN — OXYCODONE 10 MG: 5 TABLET ORAL at 08:10

## 2024-01-01 RX ADMIN — Medication 1 MG: at 22:56

## 2024-01-01 RX ADMIN — NYSTATIN 500000 UNITS: 100000 SUSPENSION ORAL at 13:19

## 2024-01-01 RX ADMIN — POTASSIUM CHLORIDE 10 MEQ: 7.46 INJECTION, SOLUTION INTRAVENOUS at 09:21

## 2024-01-01 RX ADMIN — DRONABINOL 5 MG: 5 CAPSULE ORAL at 08:45

## 2024-01-01 RX ADMIN — FOLIC ACID 2000 MCG: 1 TABLET ORAL at 09:44

## 2024-01-01 RX ADMIN — Medication 1 TABLET: at 08:16

## 2024-01-01 RX ADMIN — APIXABAN 10 MG: 5 TABLET, FILM COATED ORAL at 08:47

## 2024-01-01 RX ADMIN — OXYCODONE 10 MG: 5 TABLET ORAL at 08:54

## 2024-01-01 RX ADMIN — OXYCODONE 10 MG: 5 TABLET ORAL at 21:11

## 2024-01-01 RX ADMIN — FERROUS SULFATE TAB 325 MG (65 MG ELEMENTAL FE) 650 MG: 325 (65 FE) TAB at 09:44

## 2024-01-01 RX ADMIN — Medication 50 MEQ: at 22:59

## 2024-01-01 RX ADMIN — SODIUM CHLORIDE, PRESERVATIVE FREE 10 ML: 5 INJECTION INTRAVENOUS at 20:41

## 2024-01-01 RX ADMIN — FOLIC ACID 2000 MCG: 1 TABLET ORAL at 07:43

## 2024-01-01 RX ADMIN — MIDODRINE HYDROCHLORIDE 15 MG: 10 TABLET ORAL at 08:05

## 2024-01-01 RX ADMIN — NYSTATIN 500000 UNITS: 100000 SUSPENSION ORAL at 11:17

## 2024-01-01 RX ADMIN — APIXABAN 10 MG: 5 TABLET, FILM COATED ORAL at 21:11

## 2024-01-01 RX ADMIN — NYSTATIN 500000 UNITS: 100000 SUSPENSION ORAL at 16:12

## 2024-01-01 RX ADMIN — PANTOPRAZOLE SODIUM 40 MG: 40 TABLET, DELAYED RELEASE ORAL at 05:19

## 2024-01-01 ASSESSMENT — PAIN DESCRIPTION - PAIN TYPE
TYPE: CHRONIC PAIN

## 2024-01-01 ASSESSMENT — PAIN DESCRIPTION - LOCATION
LOCATION: ABDOMEN
LOCATION: CHEST
LOCATION: GENERALIZED
LOCATION: ABDOMEN
LOCATION: CHEST
LOCATION: GENERALIZED
LOCATION: CHEST;ABDOMEN
LOCATION: ABDOMEN;CHEST
LOCATION: CHEST
LOCATION: CHEST
LOCATION: ABDOMEN
LOCATION: GENERALIZED;RIB CAGE;ABDOMEN
LOCATION: ABDOMEN
LOCATION: CHEST
LOCATION: GENERALIZED
LOCATION: GENERALIZED
LOCATION: CHEST
LOCATION: CHEST
LOCATION: ABDOMEN
LOCATION: ABDOMEN;CHEST
LOCATION: ABDOMEN
LOCATION: GENERALIZED

## 2024-01-01 ASSESSMENT — PAIN SCALES - WONG BAKER
WONGBAKER_NUMERICALRESPONSE: HURTS A LITTLE BIT
WONGBAKER_NUMERICALRESPONSE: NO HURT
WONGBAKER_NUMERICALRESPONSE: HURTS A LITTLE BIT
WONGBAKER_NUMERICALRESPONSE: HURTS A LITTLE BIT

## 2024-01-01 ASSESSMENT — PAIN SCALES - GENERAL
PAINLEVEL_OUTOF10: 5
PAINLEVEL_OUTOF10: 6
PAINLEVEL_OUTOF10: 2
PAINLEVEL_OUTOF10: 6
PAINLEVEL_OUTOF10: 5
PAINLEVEL_OUTOF10: 2
PAINLEVEL_OUTOF10: 6
PAINLEVEL_OUTOF10: 5
PAINLEVEL_OUTOF10: 5
PAINLEVEL_OUTOF10: 0
PAINLEVEL_OUTOF10: 2
PAINLEVEL_OUTOF10: 0
PAINLEVEL_OUTOF10: 3
PAINLEVEL_OUTOF10: 1
PAINLEVEL_OUTOF10: 0
PAINLEVEL_OUTOF10: 8
PAINLEVEL_OUTOF10: 7
PAINLEVEL_OUTOF10: 0
PAINLEVEL_OUTOF10: 5
PAINLEVEL_OUTOF10: 5
PAINLEVEL_OUTOF10: 3
PAINLEVEL_OUTOF10: 4
PAINLEVEL_OUTOF10: 0
PAINLEVEL_OUTOF10: 4
PAINLEVEL_OUTOF10: 0
PAINLEVEL_OUTOF10: 0
PAINLEVEL_OUTOF10: 6
PAINLEVEL_OUTOF10: 4
PAINLEVEL_OUTOF10: 2
PAINLEVEL_OUTOF10: 0
PAINLEVEL_OUTOF10: 0
PAINLEVEL_OUTOF10: 4
PAINLEVEL_OUTOF10: 5
PAINLEVEL_OUTOF10: 4
PAINLEVEL_OUTOF10: 5
PAINLEVEL_OUTOF10: 3
PAINLEVEL_OUTOF10: 2
PAINLEVEL_OUTOF10: 5
PAINLEVEL_OUTOF10: 10
PAINLEVEL_OUTOF10: 6
PAINLEVEL_OUTOF10: 4
PAINLEVEL_OUTOF10: 2
PAINLEVEL_OUTOF10: 5
PAINLEVEL_OUTOF10: 0
PAINLEVEL_OUTOF10: 3

## 2024-01-01 ASSESSMENT — PAIN DESCRIPTION - FREQUENCY
FREQUENCY: CONTINUOUS
FREQUENCY: INTERMITTENT

## 2024-01-01 ASSESSMENT — PAIN DESCRIPTION - ONSET
ONSET: ON-GOING
ONSET: PROGRESSIVE
ONSET: ON-GOING

## 2024-01-01 ASSESSMENT — PAIN - FUNCTIONAL ASSESSMENT
PAIN_FUNCTIONAL_ASSESSMENT: PREVENTS OR INTERFERES WITH ALL ACTIVE AND SOME PASSIVE ACTIVITIES
PAIN_FUNCTIONAL_ASSESSMENT: ACTIVITIES ARE NOT PREVENTED
PAIN_FUNCTIONAL_ASSESSMENT: ACTIVITIES ARE NOT PREVENTED
PAIN_FUNCTIONAL_ASSESSMENT: PREVENTS OR INTERFERES WITH MANY ACTIVE NOT PASSIVE ACTIVITIES
PAIN_FUNCTIONAL_ASSESSMENT: 0-10
PAIN_FUNCTIONAL_ASSESSMENT: PREVENTS OR INTERFERES WITH MANY ACTIVE NOT PASSIVE ACTIVITIES
PAIN_FUNCTIONAL_ASSESSMENT: ACTIVITIES ARE NOT PREVENTED
PAIN_FUNCTIONAL_ASSESSMENT: ACTIVITIES ARE NOT PREVENTED
PAIN_FUNCTIONAL_ASSESSMENT: PREVENTS OR INTERFERES SOME ACTIVE ACTIVITIES AND ADLS
PAIN_FUNCTIONAL_ASSESSMENT: 0-10
PAIN_FUNCTIONAL_ASSESSMENT: ACTIVITIES ARE NOT PREVENTED

## 2024-01-01 ASSESSMENT — PAIN DESCRIPTION - ORIENTATION
ORIENTATION: ANTERIOR
ORIENTATION: RIGHT
ORIENTATION: RIGHT
ORIENTATION: UPPER;LOWER
ORIENTATION: RIGHT;ANTERIOR
ORIENTATION: RIGHT
ORIENTATION: RIGHT;ANTERIOR
ORIENTATION: RIGHT
ORIENTATION: RIGHT;LOWER

## 2024-01-01 ASSESSMENT — LIFESTYLE VARIABLES: HOW OFTEN DO YOU HAVE A DRINK CONTAINING ALCOHOL: NEVER

## 2024-01-01 ASSESSMENT — PAIN DESCRIPTION - DESCRIPTORS
DESCRIPTORS: PRESSURE
DESCRIPTORS: CRAMPING
DESCRIPTORS: DISCOMFORT;ACHING
DESCRIPTORS: DULL
DESCRIPTORS: PRESSURE
DESCRIPTORS: CRAMPING
DESCRIPTORS: CRAMPING;TENDER
DESCRIPTORS: ACHING
DESCRIPTORS: CRAMPING
DESCRIPTORS: ACHING;CRAMPING;DISCOMFORT;SHOOTING
DESCRIPTORS: CRAMPING

## 2024-02-12 DIAGNOSIS — R73.01 IFG (IMPAIRED FASTING GLUCOSE): ICD-10-CM

## 2024-02-12 DIAGNOSIS — E78.2 MIXED HYPERLIPIDEMIA: ICD-10-CM

## 2024-02-13 RX ORDER — ATORVASTATIN CALCIUM 20 MG/1
TABLET, FILM COATED ORAL
Qty: 90 TABLET | Refills: 3 | Status: SHIPPED | OUTPATIENT
Start: 2024-02-13

## 2024-02-13 RX ORDER — ATORVASTATIN CALCIUM 20 MG/1
TABLET, FILM COATED ORAL
Qty: 90 TABLET | Refills: 3 | Status: SHIPPED | OUTPATIENT
Start: 2024-02-13 | End: 2024-02-13 | Stop reason: SDUPTHER

## 2024-03-07 ENCOUNTER — PATIENT MESSAGE (OUTPATIENT)
Dept: FAMILY MEDICINE CLINIC | Age: 59
End: 2024-03-07

## 2024-03-07 DIAGNOSIS — R11.2 NAUSEA AND VOMITING, UNSPECIFIED VOMITING TYPE: Primary | ICD-10-CM

## 2024-03-07 RX ORDER — ONDANSETRON 4 MG/1
4-8 TABLET, ORALLY DISINTEGRATING ORAL 3 TIMES DAILY PRN
Qty: 21 TABLET | Refills: 0 | Status: SHIPPED | OUTPATIENT
Start: 2024-03-07

## 2024-03-07 NOTE — TELEPHONE ENCOUNTER
From: Malia Pennington  To: Guido Deleon  Sent: 3/7/2024 7:05 AM EST  Subject: Vomiting     Still losing weight seems whatever I eat come back up . can’t keep things down. Could you recommend something to take for the vomiting if there’s something I can take some script please send to [facility] pharmacy [facility]    thank you.

## 2024-03-11 ENCOUNTER — HOSPITAL ENCOUNTER (OUTPATIENT)
Dept: NUCLEAR MEDICINE | Age: 59
Discharge: HOME | End: 2024-03-11
Attending: ORTHOPAEDIC SURGERY
Payer: COMMERCIAL

## 2024-03-11 ENCOUNTER — HOSPITAL ENCOUNTER (OUTPATIENT)
Dept: MRI IMAGING | Age: 59
Discharge: HOME | End: 2024-03-11
Attending: ORTHOPAEDIC SURGERY
Payer: COMMERCIAL

## 2024-03-11 ENCOUNTER — HOSPITAL ENCOUNTER (OUTPATIENT)
Dept: CT IMAGING | Age: 59
Discharge: HOME | End: 2024-03-11
Attending: ORTHOPAEDIC SURGERY
Payer: COMMERCIAL

## 2024-03-11 DIAGNOSIS — M48.062 SPINAL STENOSIS, LUMBAR REGION WITH NEUROGENIC CLAUDICATION: ICD-10-CM

## 2024-03-11 DIAGNOSIS — R63.4 RECENT WEIGHT LOSS: ICD-10-CM

## 2024-03-11 DIAGNOSIS — K86.2 PANCREATIC CYST: ICD-10-CM

## 2024-03-11 DIAGNOSIS — R93.7 ABNORMAL MRI, LUMBAR SPINE: ICD-10-CM

## 2024-03-11 DIAGNOSIS — M54.16 RADICULOPATHY, LUMBAR REGION: ICD-10-CM

## 2024-03-11 PROCEDURE — A9579 GAD-BASE MR CONTRAST NOS,1ML: HCPCS | Performed by: ORTHOPAEDIC SURGERY

## 2024-03-11 PROCEDURE — 74177 CT ABD & PELVIS W/CONTRAST: CPT

## 2024-03-11 PROCEDURE — 6360000004 HC RX CONTRAST MEDICATION: Performed by: ORTHOPAEDIC SURGERY

## 2024-03-11 PROCEDURE — 74183 MRI ABD W/O CNTR FLWD CNTR: CPT

## 2024-03-11 PROCEDURE — 3430000000 HC RX DIAGNOSTIC RADIOPHARMACEUTICAL: Performed by: ORTHOPAEDIC SURGERY

## 2024-03-11 PROCEDURE — 71260 CT THORAX DX C+: CPT

## 2024-03-11 PROCEDURE — A9503 TC99M MEDRONATE: HCPCS | Performed by: ORTHOPAEDIC SURGERY

## 2024-03-11 PROCEDURE — 78306 BONE IMAGING WHOLE BODY: CPT | Performed by: ORTHOPAEDIC SURGERY

## 2024-03-11 RX ORDER — TC 99M MEDRONATE 20 MG/10ML
25.6 INJECTION, POWDER, LYOPHILIZED, FOR SOLUTION INTRAVENOUS
Status: COMPLETED | OUTPATIENT
Start: 2024-03-11 | End: 2024-03-11

## 2024-03-11 RX ADMIN — IOPAMIDOL 80 ML: 755 INJECTION, SOLUTION INTRAVENOUS at 09:34

## 2024-03-11 RX ADMIN — GADOTERIDOL 15 ML: 279.3 INJECTION, SOLUTION INTRAVENOUS at 09:44

## 2024-03-11 RX ADMIN — TC 99M MEDRONATE 25.6 MILLICURIE: 20 INJECTION, POWDER, LYOPHILIZED, FOR SOLUTION INTRAVENOUS at 09:02

## 2024-03-14 ENCOUNTER — TELEPHONE (OUTPATIENT)
Dept: FAMILY MEDICINE CLINIC | Age: 59
End: 2024-03-14

## 2024-03-14 DIAGNOSIS — C78.00 MALIGNANT NEOPLASM OF PANCREAS METASTATIC TO LUNG (HCC): ICD-10-CM

## 2024-03-14 DIAGNOSIS — C79.51 MALIGNANT NEOPLASM OF PANCREAS METASTATIC TO BONE (HCC): ICD-10-CM

## 2024-03-14 DIAGNOSIS — K86.89 PANCREATIC MASS: Primary | ICD-10-CM

## 2024-03-14 DIAGNOSIS — C25.9 MALIGNANT NEOPLASM OF PANCREAS METASTATIC TO LUNG (HCC): ICD-10-CM

## 2024-03-14 DIAGNOSIS — N13.30 HYDRONEPHROSIS, UNSPECIFIED HYDRONEPHROSIS TYPE: ICD-10-CM

## 2024-03-14 DIAGNOSIS — C25.9 MALIGNANT NEOPLASM OF PANCREAS METASTATIC TO BONE (HCC): ICD-10-CM

## 2024-03-14 NOTE — TELEPHONE ENCOUNTER
Urgent referral faxed to [facility] along with all documentation to [phone].  Called [facility] and updated Beatriz in scheduling on the referral being faxed as urgent.  Beatriz will watch for the fax and call the patient asap.  Called and updated the patient and gave her the phone number to [facility] [phone].

## 2024-03-14 NOTE — TELEPHONE ENCOUNTER
Received call from Yvette WORKMAN with Dr. Singh at [facility] regarding update on patient.  CT Abdomen, Chest, MRI Abdomen and NM Bone were completed and showed pancreatic mets to bone and lungs.   Requesting PCP to aid in setting patient up with [facility].  Patient is aware of results and request care at the [facility].   Urgent Referral placed on printer  Include imaging which is in EPIC

## 2024-03-19 ENCOUNTER — TELEPHONE (OUTPATIENT)
Dept: FAMILY MEDICINE CLINIC | Age: 59
End: 2024-03-19

## 2024-03-19 NOTE — TELEPHONE ENCOUNTER
Balbina with Dr. Joyner's office called office requesting last office notes/labs. Advised pt was last seen in our office 9/14/2023. I printed those office notes and labs from this past year, faxed them to Balbina at [phone].

## 2024-06-05 PROBLEM — J18.9 NOSOCOMIAL PNEUMONIA: Status: ACTIVE | Noted: 2024-06-05

## 2024-06-05 PROBLEM — C25.9 PANCREATIC CANCER (HCC): Status: ACTIVE | Noted: 2024-01-01

## 2024-06-05 PROBLEM — J80 ACUTE RESPIRATORY DISTRESS SYNDROME (ARDS) (HCC): Status: ACTIVE | Noted: 2024-01-01

## 2024-06-05 PROBLEM — J96.01 ACUTE RESPIRATORY FAILURE WITH HYPOXIA (HCC): Status: ACTIVE | Noted: 2024-01-01

## 2024-06-05 PROBLEM — J96.21 ACUTE ON CHRONIC RESPIRATORY FAILURE WITH HYPOXIA (HCC): Status: ACTIVE | Noted: 2024-06-05

## 2024-06-05 PROBLEM — Z86.711 HISTORY OF PULMONARY EMBOLUS (PE): Status: ACTIVE | Noted: 2024-01-01
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
## 2024-06-05 NOTE — ED NOTES
Pt to er. Pt c/o SOB, CP and abd pain. Pt states hx of pancreatic CA and dx in March. States was admitted at [facility] last week for PE. Pt states SOB not any better and got worse today. Resp regular. Call light in reasch.

## 2024-06-05 NOTE — ED NOTES
Report received from CATRACHITA Vincent. Patient resting in bed.No distress noted. Call light within reach.

## 2024-06-05 NOTE — ED PROVIDER NOTES
airspace opacities   secondary to infection or edema. Underlying metastatic disease is included   in the differential.   [TM]   2143 Palliative care note reviewed.  Patient received 10 mg Roxicodone every 4 hours for cancer breakthrough pain.  Patient has a buprenorphine patch on but is due for oxycodone at 10 PM.  I will order this now [TM]   2151 Patient reassessed.  States that her work of breathing significantly improved following HFNC [TM]      ED Course User Index  [TM] Edin Nickerson MD     Vitals reviewed:  ED Triage Vitals [06/04/24 2027]   BP Temp Temp Source Pulse Respirations SpO2 Height Weight - Scale   (!) 147/79 99.4 °F (37.4 °C) Oral (!) 132 20 (!) 84 % -- 49.9 kg (110 lb)       Differential diagnoses: Given the patient's above chief complaint and findings on history and physical examination, I thought it was appropriate to consider the following emergency medical conditions: Sepsis, pneumonia, pulmonary embolism, metastasis, pleural effusion, and others. Although, some of these diagnoses are unlikely they were considered in my medical decision making.          Summary of Patient Presentation (see ED course if left blank):      This is a pleasant 58-year-old female who presented today after recent discharge from [facility].  She was recently diagnosed with pancreatic adenocarcinoma with mets to the spine earlier this year.  When she was admitted at [facility] last week she was found to have numerous pulmonary emboli and was discharged on Eliquis.  They also discharged her on 2 L nasal cannula which is been her baseline however the last couple of days she has had increased work of breathing and a productive cough.  She arrived hypoxic on her 2 L and this was turned up to 4 L.  While her hypoxia resolved she still displayed accessory muscle usage and remained tachypneic.  This improved significantly when placed on high flow nasal cannula, patient states that she now feels like she can breathe like  transcriptions may have been dictated by use of voice recognition software and electronically transcribed, and parts may have been transcribed with the assistance of an ED scribe. The transcription may contain errors not detected in proofreading.  Please refer to my supervising physician's documentation if my documentation differs.    Electronically Signed: Edin Nickerson MD, 06/04/24, 10:01 PM        Jerod Guan DO  06/08/24 8543

## 2024-06-05 NOTE — ED NOTES
Pt medicated per MAR. Side rails x 2. Call light within reach. Family at bedside. Denies further needs.

## 2024-06-05 NOTE — ED NOTES
Pt medicated per MAR. Pt resting on cot. Pt family at bedside. Call light within reach. Denies further needs at this time.

## 2024-06-05 NOTE — PROCEDURES
PROCEDURE NOTE  Date: 6/5/2024   Name: Malia Pennington  YOB: 1965    Procedures  12 lead EKG completed. Results handed to Maria D DOMÍNGUEZ.

## 2024-06-05 NOTE — H&P
Hospitalist  History and Physical    Patient:  Malia Pennington  MRN: [id number]    CHIEF COMPLAINT:  shortness of breath, chest pain and abdominal pain    History Obtained From:  patient, electronic medical record  PCP: Guido Deleon APRN - CNP    HISTORY OF PRESENT ILLNESS:   Malia Pennington is a 58 y.o.female with PMHx of rheumatoid arthritis and pancreatic adenocarcinoma with metastasis recently admitted to [facility] for pulmonary embolism who presents to the emergency department for evaluation of shortness of breath.  Patient reports that she was diagnosed with pancreatic adenocarcinoma back in March 2024.  Since then she has had progressive abdominal pain and shortness of breath.  It was found that it has metastasized to her spine.  Patient was admitted at [facility] last week when she was found to be short of breath and had multiple pulmonary emboli.  Patient states that she has not received chemotherapy in 2 weeks and will be starting radiation on June 13. She denies any fevers but reports a productive cough and increased dyspnea over the last couple of days.  States that she is normally on 2 L nasal cannula however she has been requiring 4 L and still has increased work of breathing.  Patient reports worsening abdominal distention but no jersey pain.  She denies any chest pain.  Patient denies any nausea but does report posttussive emesis.      Past Medical History:        Diagnosis Date    Arthritis pain     Blood circulation, collateral     both arms--related to RA    Difficulty in swallowing 2013    DVT (deep venous thrombosis) (HCC)     Esophageal dysmotility 5/10/2013    Essential hypertension 4/23/2018    GERD (gastroesophageal reflux disease)     Hemorrhoids     Osteoarthritis     Pneumonia     Rheumatoid arthritis (HCC)     Tobacco abuse 4/20/2013       Past Surgical History:        Procedure Laterality Date    BACK SURGERY  1999 ?    ECTOPIC PREGNANCY SURGERY      ENDOMETRIAL ABLATION      JOINT REPLACEMENT

## 2024-06-05 NOTE — PLAN OF CARE
Problem: Discharge Planning  Goal: Discharge to home or other facility with appropriate resources  Outcome: Progressing  Flowsheets (Taken 6/5/2024 0258)  Discharge to home or other facility with appropriate resources:   Identify barriers to discharge with patient and caregiver   Arrange for needed discharge resources and transportation as appropriate   Identify discharge learning needs (meds, wound care, etc)   Refer to discharge planning if patient needs post-hospital services based on physician order or complex needs related to functional status, cognitive ability or social support system     Problem: Pain  Goal: Verbalizes/displays adequate comfort level or baseline comfort level  Outcome: Progressing  Flowsheets (Taken 6/5/2024 0258)  Verbalizes/displays adequate comfort level or baseline comfort level:   Encourage patient to monitor pain and request assistance   Assess pain using appropriate pain scale   Administer analgesics based on type and severity of pain and evaluate response   Implement non-pharmacological measures as appropriate and evaluate response     Problem: Safety - Adult  Goal: Free from fall injury  Outcome: Progressing  Flowsheets (Taken 6/5/2024 0258)  Free From Fall Injury:   Instruct family/caregiver on patient safety   Based on caregiver fall risk screen, instruct family/caregiver to ask for assistance with transferring infant if caregiver noted to have fall risk factors     Problem: Chronic Conditions and Co-morbidities  Goal: Patient's chronic conditions and co-morbidity symptoms are monitored and maintained or improved  Outcome: Progressing  Flowsheets (Taken 6/5/2024 0258)  Care Plan - Patient's Chronic Conditions and Co-Morbidity Symptoms are Monitored and Maintained or Improved:   Monitor and assess patient's chronic conditions and comorbid symptoms for stability, deterioration, or improvement   Collaborate with multidisciplinary team to address chronic and comorbid conditions and

## 2024-06-05 NOTE — ED NOTES
ED to inpatient nurses report      Chief Complaint:  Chief Complaint   Patient presents with    Shortness of Breath    Abdominal Pain     Present to ED from: home    MOA:     LOC: alert and orientated to name, place, date  Mobility: Requires assistance * 1  Oxygen Baseline: 2 liters    Current needs required: high flow     Code Status:   Prior    What abnormal results were found and what did you give/do to treat them? Pneumonia - ATB  Any procedures or intervention occur? N/a    Mental Status:  Level of Consciousness: Alert (0)    Psych Assessment:        Vitals:  Patient Vitals for the past 24 hrs:   BP Temp Temp src Pulse Resp SpO2 Weight   06/05/24 0030 93/63 -- -- (!) 112 25 98 % --   06/04/24 2315 97/68 -- -- (!) 120 26 94 % --   06/04/24 2240 115/68 -- -- (!) 119 16 99 % --   06/04/24 2204 118/80 -- -- (!) 120 22 97 % --   06/04/24 2134 113/73 -- -- (!) 120 28 98 % --   06/04/24 2051 -- -- -- -- -- 93 % --   06/04/24 2027 (!) 147/79 99.4 °F (37.4 °C) Oral (!) 132 20 (!) 84 % 49.9 kg (110 lb)        LDAs:      Ambulatory Status:  No data recorded    Diagnosis:  DISPOSITION Admitted 06/05/2024 12:10:54 AM   Final diagnoses:   Acute on chronic respiratory failure with hypoxia (HCC)   Septicemia (HCC)   Pneumonia of right middle lobe due to infectious organism   Pneumonia of right lower lobe due to infectious organism        Consults:  None     Pain Score:  Pain Assessment  Pain Assessment: 0-10  Pain Level: 4    C-SSRS:        Sepsis Screening:  Sepsis Risk Score: 2.29    [place] Fall Risk:       Swallow Screening        Preferred Language:   English      ALLERGIES     Remicade [infliximab] and Arava [leflunomide]    SURGICAL HISTORY       Past Surgical History:   Procedure Laterality Date    BACK SURGERY  1999 ?    ECTOPIC PREGNANCY SURGERY      ENDOMETRIAL ABLATION      JOINT REPLACEMENT      R wrist fusion    LUMBAR FUSION N/A 02/13/2020    LUMBAR DECOMPRESSION, DISC REMOVAL BETWEEN L3-4, REVISION PREVIOUS

## 2024-06-05 NOTE — ED NOTES
Pt medicated per MAR. Pt resting on cot with family at bedside. Call light within reach. Denies further needs at this time.

## 2024-06-05 NOTE — PLAN OF CARE
Problem: Respiratory - Adult  Goal: Achieves optimal ventilation and oxygenation  Outcome: Progressing   Patient on HFNC to maintain SpO2 greater than 90%. Patient tolerating well. Patient mutually agrees with goal of care

## 2024-06-05 NOTE — CARE COORDINATION
Case Management Assessment Initial Evaluation    Date/Time of Evaluation: 2024 8:32 AM  Assessment Completed by: Valerie Green RN    If patient is discharged prior to next notation, then this note serves as note for discharge by case management.    Patient Name: Malia Pennington                   YOB: 1965  Diagnosis: Septicemia (HCC) [A41.9]  Acute respiratory failure with hypoxia (HCC) [J96.01]  Acute on chronic respiratory failure with hypoxia (HCC) [J96.21]  Pneumonia of right middle lobe due to infectious organism [J18.9]  Pneumonia of right lower lobe due to infectious organism [J18.9]                   Date / Time: 2024  8:22 PM  Location: [facility]     Patient Admission Status: Inpatient   Readmission Risk Low 0-14, Mod 15-19), High > 20: Readmission Risk Score: 13.6    Current PCP: Guido Deleon, APRN - CNP    Additional Case Management Notes: Admitted through ED with SOB, chest pain and abdominal pain. Pt was recently at [facility] for PE. Pt has history of pancreatic adenocarcinoma with mets. CRP 11.28. BNP 1690.0. Troponin 16. Lasix 40mg iv x1 today. Zosyn iv q8hr. Vancomycin iv q12hr. Consulting Pulmonology. Tmax 100.1F. HF O2 40L and 52% FiO2 Sats 100%. IS. Acapella.     Procedures: None    Imagin/4 CXR: Diffuse bilateral interstitial and multifocal bilateral airspace opacities secondary to infection or edema. Underlying metastatic disease is included in the differential.   CTA chest:   1. Bilateral segmental/subsegmental pulmonary emboli, small clot burden.   No central or saddle pulmonary embolus. No CT evidence for right heart   strain.  2. Small bilateral pleural effusions.  3. Multifocal bilateral airspace and ground-glass opacities suggestive of   inflammatory or infectious process. Metastatic disease is included in the   differential.  4. Progressive osseous metastatic disease. No pathologic compression   fracture in the thoracic spine.   CT abd/pelvis:   1.

## 2024-06-05 NOTE — CONSULTS
[facility]      Patient - Malia Pennington   MRN -  [id number]   [facility] # - [id number]   - 1965      Date of Admission -  2024  8:22 PM  Date of evaluation -  2024  Room - 3A--A   Hospital Day - 0  Consulting - Chirag Pizarro MD Primary Care Physician - Guido Deleon APRN - CNP     Problem List      Active Hospital Problems    Diagnosis Date Noted    Acute respiratory failure with hypoxia (HCC) [J96.01] 2024    Nosocomial pneumonia [J18.9, Y95] 2024    Acute respiratory distress syndrome (ARDS) (HCC) [J80] 2024    History of pulmonary embolus (PE) [Z86.711] 2024    Pancreatic cancer (HCC) [C25.9] 2024    Acute on chronic respiratory failure with hypoxia (HCC) [J96.21] 2024    Immunosuppressed status (HCC) [D84.9]     Rheumatoid arthritis (HCC) [M06.9] 07/10/2019    Tobacco abuse [Z72.0] 2013    GERD (gastroesophageal reflux disease) [K21.9] 2013     Reason for Consult    Acute respiratory failure, nosocomial pneumonia    HPI     Malia Pennington is a 58 y.o. female with hx RA (on MTX, sulfasalazine, prednisone), pancreatic adenocarcinoma Stage IV (mets to bone, omentum, lungs) who was admitted overnight  for AHRF. Found to have bilateral PE last week at [facility]. She was discharged on 2 LPM supplemental oxygen and Eliquis (previously on room air).She was dignosed with metastatic pancreatic cancer 2024, following with [facility] at [facility]. Her SOB has progressively worsened linear fashion in recent months until discharge at [facility] few days ago. Here has required HHFNC FiO2 40%, 30 LPM. She is on chemo with Gemcitabine and Paclitaxel. Last received 24. Echo at [facility] from  with normal EF, no RHS. Pneumonia panel negative. She was started on Vanc/Zosyn and subsequently admitted.      PMHx   Past Medical History      Diagnosis Date    Arthritis pain     Blood circulation, collateral     both  carcinomatosis. Compared to the PET/CT from  4/10/2024, there is increased moderate volume ascites. Otherwise, areas of  peritoneal/omental caking and nodularity are not significantly changed.  2. Mild bilateral hydronephrosis. Delayed left nephrogram compatible with  left obstructive uropathy, probably related to peritoneal/serosal  carcinomatosis. Left renal pelvis and UPJ urothelial thickening and  enhancement which may be related to serosal disease or an  infectious/inflammatory process.  3. No significant interval change in the cystic mass in the pancreatic body.  Abnormal soft tissue encasing the celiac artery and branches and SMA, likely  reflecting tumor.  4. Stable lymphadenopathy compared to the PET from 4/10/2024.  5. Stable osseous metastases compared to the PET from 4/10/2024.  6. New main portal vein thrombus. 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 This finding was communicated to Dr. Delgado by Dr. Patt Lockett on 5/24/2024 at 2:12 PM.     Assessment   Acute Hypoxic Respiratory Failure: Discharged on 2 LPM as of 05/29/24. Now re-admitted requiring HHFNC FiO2 40%, 30 LPM. Appears multifactorial due to possible chemo-induced pneumonitis, progression of metastatic disease, fluid overload, possible PNA +/-inflammatory process. With underlying ILD/IPF. PNA panel negative   Pancreatic Adenocarcinoma with Mets (Stage IV) to bone, lung, liver, omentum: Known disease. Dx March 2024. Follows at the [facility] in [place]. CT head negative for Mets. Chemo (Gemcitabine & Paclitaxel; last dose 05/17/24). Starts radiation therapy June 13  Acute Bilateral PE (Submassive): Multiple acute bilateral PE involving lobar/segmental and subsegmental pulmonary arteries. No RV strain per echo  New Main Portal Vein Thrombus: At risk for developing Budd-Chiari. Evidenced by ascites formation. Eliquis.   UTI 2/2 Strep Anginosus: Per urine culture at outside facility 05/29/24. On Cefepime   Rheumatoid

## 2024-06-05 NOTE — PROGRESS NOTES
[facility]   Pharmacy Pharmacokinetic Monitoring Service - Vancomycin     Malia Pennington is a 58 y.o. female starting on vancomycin therapy for HAP. Pharmacy consulted by Jalen Siegel for monitoring and adjustment.    Target Concentration: Goal AUC/MIREILLE 400-600 mg*hr/L    Additional Antimicrobials: Zosyn    Pertinent Laboratory Values:   Wt Readings from Last 1 Encounters:   06/05/24 50.9 kg (112 lb 3.4 oz)     Temp Readings from Last 1 Encounters:   06/05/24 98.4 °F (36.9 °C) (Oral)     Estimated Creatinine Clearance: 160 mL/min (A) (based on SCr of 0.3 mg/dL (L)).  Recent Labs     06/04/24 2040   CREATININE 0.3*   BUN 7   WBC 11.8*     MRSA Nasal Swab: was ordered by provider, awaiting results.    Plan:  Dosing recommendations based on Bayesian software  Start vancomycin 1000 mg Q12H- sCr outside of model range, therefore less predictive  Anticipated AUC of 458 and trough concentration of 10.2 at steady state (if sCr = 0.5 used instead of 0.3)   Renal labs as indicated   Vancomycin concentration 6/6/2024 0600  Pharmacy will continue to monitor patient and adjust therapy as indicated    Thank you for the consult,  Christine Morfin PharmD 6/5/2024 1:28 AM

## 2024-06-05 NOTE — PROGRESS NOTES
Hospitalist Progress Note      Patient:  Malia Pennington    Unit/Bed:3A-04/004-A  YOB: 1965  MRN: [id number]   Acct: [id number]   PCP: Guido Deleon APRN - CNP  Date of Admission: 6/4/2024    Assessment/Plan:  #Acute hypoxic respiratory failure POA.Most likely secondary to multiple PE,HCAP,metastaic disease and pleural effusion.  -CT thorax done showed  Bilateral segmental/subsegmental pulmonary emboli, small clot burden. No central or saddle pulmonary embolus. No CT evidence for right heart   strain.Small bilateral pleural effusions.Multifocal bilateral airspace and ground-glass opacities suggestive of   inflammatory or infectious process. Metastatic disease is included in the differential.Progressive osseous metastatic disease. No pathologic compression fracture in the thoracic spine.  -COVID,Influenza A&B negative,Pneumonia panel negative,MRSA negative.Sputum culture pending.  -Will discontinue Vancomycin.Continue with Zosyn.  -Patient was discharged from [facility] on 2l nc.Presently patient on high flow nc.  -Wean as tolerated.  -TTE done showed eft ventricle is normal in size and global systolic function, LVEF 55-60%. Diastolic function is normal.   -Patient was intimally on Lovenox and now transitioned to Eliquis.Will continue.  -Pulmonology consulted pending.    #HCA Pneumonia.  -Pneumonia panel ,MRSA ,COVID,Influenza A&B negative.  -Continue with Zosyn.Vancomycin discontinued.    #B/L PE.  -Continue with eliquis.    #Metastatic adenocarcinoma of pancreas(Biopsy proven).  -Patient has been going chemotherapy at [facility].Last treatment on 05/17/2024,was due for another treatment on 05/29/2024 that was rescheduled.  -Whole body PET scan 3/11/2024 abnormal radiotracer accumulation at the left anterior eighth rib in the T11 and T12 were vertebral highly suspicious for osseous metastatic disease .  -MRI abdomen 3/11/2024 small amount of  Redemonstration of multiple tiny hepatic hypodensities which are too small to accurately characterize. No enhancing liver lesion, splenomegaly or adrenal mass. Cystic lesion in the tail of the pancreas measures 2.7 x 2.3 cm previously 2.6 x 2.2 cm. Unchanged peripancreatic lymphadenopathy. Persistent mild left hydronephrosis. Redemonstration of several cysts in the left kidney. Gallbladder unremarkable. Atherosclerotic changes throughout the aorta. No AAA. Moderate amount of ascites in the abdomen. Large volume ascites in the pelvis. No bowel obstruction or ileus. No pneumatosis or pneumoperitoneum. Large colonic stool burden. Numerous sigmoid diverticula with no definite acute diverticulitis. Appendix and uterus unremarkable. L3-S1 posterior fusion. Osteoblastic metastases in T9, T11 and T12.     1. Moderate-to-large volume abdominal and pelvic ascites. 2. No small bowel obstruction or ileus. 3. Persistent mild left hydronephrosis. No definite obstructing calculus. 4. Stable cystic mass in the pancreatic tail. 5. Multiple osseous metastases. This document has been electronically signed by: Patricia Carlton DO on 06/04/2024 10:52 PM All CTs at this facility use dose modulation techniques and iterative reconstructions, and/or weight-based dosing when appropriate to reduce radiation to a low as reasonably achievable.    CTA CHEST W WO CONTRAST    Result Date: 6/4/2024   CHEST WITH CONTRAST. 3D POSTPROCESSING. Comparison: CT/KO/SR - CT CHEST W CONTRAST - 03/11/2024 09:36 AM EDT Findings: The heart is normal size. RV/LV ratio is normal. Small pericardial effusion. Right taryn catheter tip is in the right atrium. Atherosclerotic changes. No thoracic aortic aneurysm or dissection. There are filling defects in the bilateral upper, right middle and bilateral lower lobe segmental and subsegmental pulmonary arteries. No central or saddle embolus. No thyromegaly or lymphadenopathy. Small bilateral pleural effusions. No

## 2024-06-06 NOTE — PROGRESS NOTES
Physician Progress Note      PATIENT:               WALLY MARTINEZ  CSN #:                  [id number]  :                       1965  ADMIT DATE:       2024 8:22 PM  DISCH DATE:  RESPONDING  PROVIDER #:        Chirag Wood MD          QUERY TEXT:    Pt admitted with acute respiratory failure and pneumonia.  Noted documentation   of sepsis in ED provider note.  If possible, please document in progress   notes and discharge summary:    The medical record reflects the following:    Risk Factors: PNA  Clinical Indicators: on  WBC 11.8, PCT 0.27, lactic sepsis WNL, HR up to   132, RR up to 28, temperature up to 103.7 on , continues to have HR up to   130 and RR up to 32, Covid/Flu negative, MRSA negative, pneumonia panel   negative  Treatment: IV Zosyn, IVF    Thank you!    Marlon Sears, BSN,RN, CRCR  RN Clinical   Options provided:  -- Sepsis confirmed present on admission  -- Sepsis ruled out  -- Other - I will add my own diagnosis  -- Disagree - Not applicable / Not valid  -- Disagree - Clinically unable to determine / Unknown  -- Refer to Clinical Documentation Reviewer    PROVIDER RESPONSE TEXT:    The diagnosis of sepsis was ruled out.    Query created by: Marlon Sears on 2024 1:03 PM      Electronically signed by:  Chirag Wood MD 2024 1:25 PM

## 2024-06-06 NOTE — PLAN OF CARE
Problem: Discharge Planning  Goal: Discharge to home or other facility with appropriate resources  Outcome: Progressing  Flowsheets (Taken 6/5/2024 2243)  Discharge to home or other facility with appropriate resources: Identify barriers to discharge with patient and caregiver     Problem: Pain  Goal: Verbalizes/displays adequate comfort level or baseline comfort level  Outcome: Progressing  Flowsheets (Taken 6/5/2024 2243)  Verbalizes/displays adequate comfort level or baseline comfort level:   Encourage patient to monitor pain and request assistance   Assess pain using appropriate pain scale   Administer analgesics based on type and severity of pain and evaluate response   Implement non-pharmacological measures as appropriate and evaluate response     Problem: Safety - Adult  Goal: Free from fall injury  Outcome: Progressing  Flowsheets (Taken 6/5/2024 2243)  Free From Fall Injury: Instruct family/caregiver on patient safety     Problem: Chronic Conditions and Co-morbidities  Goal: Patient's chronic conditions and co-morbidity symptoms are monitored and maintained or improved  Outcome: Progressing  Flowsheets (Taken 6/5/2024 2243)  Care Plan - Patient's Chronic Conditions and Co-Morbidity Symptoms are Monitored and Maintained or Improved: Monitor and assess patient's chronic conditions and comorbid symptoms for stability, deterioration, or improvement     Problem: Respiratory - Adult  Goal: Achieves optimal ventilation and oxygenation  Outcome: Progressing  Flowsheets (Taken 6/5/2024 2243)  Achieves optimal ventilation and oxygenation: Assess for changes in respiratory status

## 2024-06-06 NOTE — PLAN OF CARE
Problem: Discharge Planning  Goal: Discharge to home or other facility with appropriate resources  6/6/2024 1056 by Melissa Black RN  Outcome: Progressing  Flowsheets (Taken 6/6/2024 1056)  Discharge to home or other facility with appropriate resources:   Identify barriers to discharge with patient and caregiver   Identify discharge learning needs (meds, wound care, etc)  6/5/2024 2243 by Nena Goodson RN  Outcome: Progressing  Flowsheets (Taken 6/5/2024 2243)  Discharge to home or other facility with appropriate resources: Identify barriers to discharge with patient and caregiver     Problem: Pain  Goal: Verbalizes/displays adequate comfort level or baseline comfort level  6/6/2024 1056 by Melissa Black RN  Outcome: Progressing  Flowsheets (Taken 6/6/2024 1056)  Verbalizes/displays adequate comfort level or baseline comfort level:   Encourage patient to monitor pain and request assistance   Administer analgesics based on type and severity of pain and evaluate response  6/5/2024 2243 by Nena Goodson RN  Outcome: Progressing  Flowsheets (Taken 6/5/2024 2243)  Verbalizes/displays adequate comfort level or baseline comfort level:   Encourage patient to monitor pain and request assistance   Assess pain using appropriate pain scale   Administer analgesics based on type and severity of pain and evaluate response   Implement non-pharmacological measures as appropriate and evaluate response     Problem: Safety - Adult  Goal: Free from fall injury  6/6/2024 1056 by Melissa Black RN  Outcome: Progressing  Flowsheets (Taken 6/6/2024 1056)  Free From Fall Injury: Instruct family/caregiver on patient safety  6/5/2024 2243 by Nena Goodson, RN  Outcome: Progressing  Flowsheets (Taken 6/5/2024 2243)  Free From Fall Injury: Instruct family/caregiver on patient safety     Problem: Chronic Conditions and Co-morbidities  Goal: Patient's chronic conditions and co-morbidity symptoms are monitored and maintained or  improved  6/6/2024 1056 by Melissa Black RN  Outcome: Progressing  Flowsheets (Taken 6/6/2024 1056)  Care Plan - Patient's Chronic Conditions and Co-Morbidity Symptoms are Monitored and Maintained or Improved:   Monitor and assess patient's chronic conditions and comorbid symptoms for stability, deterioration, or improvement   Collaborate with multidisciplinary team to address chronic and comorbid conditions and prevent exacerbation or deterioration  6/5/2024 2243 by Nena Goodson, RN  Outcome: Progressing  Flowsheets (Taken 6/5/2024 2243)  Care Plan - Patient's Chronic Conditions and Co-Morbidity Symptoms are Monitored and Maintained or Improved: Monitor and assess patient's chronic conditions and comorbid symptoms for stability, deterioration, or improvement     Problem: Respiratory - Adult  Goal: Achieves optimal ventilation and oxygenation  6/6/2024 1056 by Melissa Black RN  Outcome: Progressing  Flowsheets (Taken 6/6/2024 1056)  Achieves optimal ventilation and oxygenation:   Assess for changes in respiratory status   Position to facilitate oxygenation and minimize respiratory effort  6/5/2024 2243 by Nena Goodson, RN  Outcome: Progressing  Flowsheets (Taken 6/5/2024 2243)  Achieves optimal ventilation and oxygenation: Assess for changes in respiratory status

## 2024-06-06 NOTE — CARE COORDINATION
6/6/24, 12:18 PM EDT    DISCHARGE ON GOING EVALUATION    [facility] day: 1  Location: 3A-04/004-A Reason for admit: Septicemia (HCC) [A41.9]  Acute respiratory failure with hypoxia (HCC) [J96.01]  Acute on chronic respiratory failure with hypoxia (HCC) [J96.21]  Pneumonia of right middle lobe due to infectious organism [J18.9]  Pneumonia of right lower lobe due to infectious organism [J18.9]     Procedures: None    Imaging since last note: None    Barriers to Discharge: Hospitalist and Pulmonology following. Respiratory culture: Candida Albicans. Tmax 103.7F rectal. Sinus tachycardia 103-119. HF O2 weaned to 4L/nc, sats 98%. Lasix 40mg iv q12hr. Nystatin qid. Zosyn iv q8hr.     PCP: Guido Deleon APRN - CNP  Readmission Risk Score: 14.9    Patient Goals/Plan/Treatment Preferences: Plans home with . She does her chemotherapy at [facility] in [place] every other week. She has cane, walker and home O2 from [employer]. Wears O2 at 2L at night and with activity. Pt states she does self cath at home as well.

## 2024-06-06 NOTE — PROGRESS NOTES
Hospitalist Progress Note      Patient:  Malia Pennington    Unit/Bed:3A-04/004-A  YOB: 1965  MRN: [id number]   Acct: [id number]   PCP: Guido Deleon APRN - CNP  Date of Admission: 6/4/2024    Assessment/Plan:  #Acute hypoxic respiratory failure POA.Most likely secondary to multiple PE,HCAP,metastaic disease and pleural effusion.  -CT thorax done showed  Bilateral segmental/subsegmental pulmonary emboli, small clot burden. No central or saddle pulmonary embolus. No CT evidence for right heart   strain.Small bilateral pleural effusions.Multifocal bilateral airspace and ground-glass opacities suggestive of   inflammatory or infectious process. Metastatic disease is included in the differential.Progressive osseous metastatic disease. No pathologic compression fracture in the thoracic spine.  -COVID,Influenza A&B negative,Pneumonia panel negative,MRSA negative.Sputum culture pending.  -Will discontinue Vancomycin.Continue with Zosyn.  -Patient was discharged from [facility] on 2l nc.Was on high flow and now weaned down to 5l nc.  -Wean as tolerated.  -TTE done showed eft ventricle is normal in size and global systolic function, LVEF 55-60%. Diastolic function is normal.   -Patient was intimally on Lovenox and now transitioned to Eliquis.Will continue.  -Pulmonology consult appreciated.Lasix added.    #HCA Pneumonia.  -Pneumonia panel ,MRSA ,COVID,Influenza A&B negative.  -Continue with Zosyn.Vancomycin discontinued.    #B/L PE.  -Continue with eliquis.    #Metastatic adenocarcinoma of pancreas(Biopsy proven).  -Patient has been going chemotherapy at [facility].Last treatment on 05/17/2024,was due for another treatment on 05/29/2024 that was rescheduled.  -Whole body PET scan 3/11/2024 abnormal radiotracer accumulation at the left anterior eighth rib in the T11 and T12 were vertebral highly suspicious for osseous metastatic disease .  -MRI abdomen

## 2024-06-06 NOTE — PROGRESS NOTES
Pt was in bed as her  was with her. She was dealing with acute respiratory failure. She was encouraged and blessed.    06/06/24 1433   Encounter Summary   Encounter Overview/Reason Initial Encounter   Service Provided For Patient and family together   Referral/Consult From [facility]   Support System Spouse   Last Encounter  06/06/24   Complexity of Encounter Low   Begin Time 0855   End Time  0900   Total Time Calculated 5 min   Spiritual/Emotional needs   Type Spiritual Support   Assessment/Intervention/Outcome   Assessment Hopeful   Intervention Empowerment   Outcome Acceptance;Encouraged

## 2024-06-06 NOTE — PROGRESS NOTES
[facility]      Patient - Malia Pennington   MRN -  [id number]   [facility] # - [id number]   - 1965      Date of Admission -  2024  8:22 PM  Date of evaluation -  2024  Room - 3A--A   Hospital Day - 1  Consulting - Chirag Pizarro MD Primary Care Physician - Guido Deleon APRN - CNP     Problem List      Active Hospital Problems    Diagnosis Date Noted    Acute respiratory failure with hypoxia (HCC) [J96.01] 2024    Nosocomial pneumonia [J18.9, Y95] 2024    Acute respiratory distress syndrome (ARDS) (HCC) [J80] 2024    History of pulmonary embolus (PE) [Z86.711] 2024    Pancreatic cancer (HCC) [C25.9] 2024    Acute on chronic respiratory failure with hypoxia (HCC) [J96.21] 2024    Immunosuppressed status (HCC) [D84.9]     Rheumatoid arthritis (HCC) [M06.9] 07/10/2019    Tobacco abuse [Z72.0] 2013    GERD (gastroesophageal reflux disease) [K21.9] 2013     Reason for Consult    Acute on chronic respiratory failure with hypoxia req HFNC  HPI     Malia Pennington is a 58 y.o. female with hx RA (on MTX, sulfasalazine, prednisone), pancreatic adenocarcinoma Stage IV (mets to bone, omentum, lungs) who was admitted overnight  for AHRF. Found to have bilateral PE last week at [facility]. She was discharged on 2 LPM supplemental oxygen and Eliquis (previously on room air).She was dignosed with metastatic pancreatic cancer 2024, following with [facility] at [facility]. Her SOB has progressively worsened linear fashion in recent months until discharge at [facility] few days ago. Here has required HHFNC FiO2 40%, 30 LPM. She is on chemo with Gemcitabine and Paclitaxel. Last received 24. Echo at [facility] from  with normal EF, no RHS. Pneumonia panel negative. She was started on Vanc/Zosyn and subsequently admitted.        Past 24 hrs   -On 4 LPM via NC   -significant improvement in hypoxia after diuresis   -excellent  thoracic spine.    CT Abdomen/pelvis (05/24/24) Care Everywhere)  1. There is peritoneal carcinomatosis. Compared to the PET/CT from  4/10/2024, there is increased moderate volume ascites. Otherwise, areas of  peritoneal/omental caking and nodularity are not significantly changed.  2. Mild bilateral hydronephrosis. Delayed left nephrogram compatible with  left obstructive uropathy, probably related to peritoneal/serosal  carcinomatosis. Left renal pelvis and UPJ urothelial thickening and  enhancement which may be related to serosal disease or an  infectious/inflammatory process.  3. No significant interval change in the cystic mass in the pancreatic body.  Abnormal soft tissue encasing the celiac artery and branches and SMA, likely  reflecting tumor.  4. Stable lymphadenopathy compared to the PET from 4/10/2024.  5. Stable osseous metastases compared to the PET from 4/10/2024.  6. New main portal vein thrombus. 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 This finding was communicated to Dr. Delgado by Dr. Patt Lockett on 5/24/2024 at 2:12 PM.     NUC PET OTHER    4/10/2024    FINDINGS:   Head/Neck: Physiologic FDG uptake is noted in the salivary glands and   tonsillar tissue.  There is no hypermetabolic cervical or supraclavicular   lymphadenopathy. Normal, intense physiologic uptake is noted in the cerebral   cortex gray matter and subcortical nuclei without gross hypermetabolic   abnormality.     Chest: Multiple mild to moderate hypermetabolic nodular/groundglass opacities   in bilateral lungs. There are also multiple minimal to mild hypermetabolic   cavitary lesions, for example the anterior superior right lower lobe cavitary   lesion has maximum SUV 1.8. There are mild hypermetabolic lymph nodes in the   pretracheal (maximum SUV 4.2), AP window and subcarinal (maximum SUV 4.3)   regions. Physiologic FDG uptake is seen in the myocardium.   Trace amount of left pleural effusion.     Abdomen/Pelvis:

## 2024-06-07 ENCOUNTER — TELEPHONE (OUTPATIENT)
Dept: PULMONOLOGY | Age: 59
End: 2024-06-07

## 2024-06-07 PROBLEM — J96.01 ACUTE RESPIRATORY FAILURE WITH HYPOXIA (HCC): Status: RESOLVED | Noted: 2024-01-01 | Resolved: 2024-01-01

## 2024-06-07 PROBLEM — E43 SEVERE MALNUTRITION (HCC): Chronic | Status: ACTIVE | Noted: 2024-01-01

## 2024-06-07 NOTE — TELEPHONE ENCOUNTER
----- Message from Guido Griffiths, FABIAN - CNP sent at 6/7/2024  9:29 AM EDT -----    4-6 week follow-up with CXR PA/L 2 days prior to appt    Thank you,   Guido Griffiths    Appointment made

## 2024-06-07 NOTE — SIGNIFICANT EVENT
Was asked by Dr. Pizarro to evaluate patient to assess for need for ICU admission secondary to refractory hypotension    Patient has been maintaining blood pressure 70s over 40s throughout most of today.  She is currently at the end of her third 1 L bolus without much improvement.    Patient remains hypotensive, tachycardic and tachypneic.  Discussion was had with her regarding potential need to transfer to ICU.    Case discussed with Dr. Arango.  Unclear if Dr. Pizarro had discussion with patient regarding her prognosis. He will accept the patient.     Electronically signed by Margoth Goldsmith PA-C on 6/7/2024 at 5:14 PM

## 2024-06-07 NOTE — CARE COORDINATION
6/7/24, 1:11 PM EDT    DISCHARGE ON GOING EVALUATION    Malia GLOVER [facility] day: 2  Location: 3A-04/004-A Reason for admit: Septicemia (HCC) [A41.9]  Acute respiratory failure with hypoxia (HCC) [J96.01]  Acute on chronic respiratory failure with hypoxia (HCC) [J96.21]  Pneumonia of right middle lobe due to infectious organism [J18.9]  Pneumonia of right lower lobe due to infectious organism [J18.9]     Procedures:   6/6 US guided paracentesis: Successful ultrasound-guided paracentesis. 0.6 L of fluid drained.     Imaging since last note: None    Barriers to Discharge: Hospitalist and Pulmonology following. Respiratory culture: Candida Albicans. O2 weaned down to 1L/nc, sats 89-93%. Hypokalemia. Zosyn iv q8hr. Electrolyte replacements. IVF bolus x1 today. Given single doses of Aldactone and Lasix iv.     PCP: Guido Deleon APRN - CNP  Readmission Risk Score: 14.6    Patient Goals/Plan/Treatment Preferences: Plans home with . She does her chemotherapy at [facility] every other week. She has cane, walker and home O2 from [employer]. Wears O2 at 2L at night and with activity. Pt states she does self cath at home as well. She would like light-weight O2 (Inogen).

## 2024-06-07 NOTE — PROGRESS NOTES
Patient Vitals for the past 96 hrs (Last 3 readings):   Weight   06/07/24 0500 50.1 kg (110 lb 7.2 oz)   06/05/24 0335 54.1 kg (119 lb 4.3 oz)   06/05/24 0058 50.9 kg (112 lb 3.4 oz)       Exam     Physical Exam   Constitutional: No distress on O2 per NC. Patient appears acutely on chronically ill, thinly built BMI 17  Head: Normocephalic and atraumatic.   Mouth/Throat: Oropharynx is clear and moist.  No oral thrush.  Eyes: Conjunctivae are normal. Pupils are equal, round. No scleral icterus.   Neck: Neck supple. No tracheal deviation present.   Cardiovascular: S1 and S2 with no murmur.  No peripheral edema  Pulmonary/Chest: Normal effort with bilateral air entry, clear breath sounds. No stridor. No respiratory distress. Patient exhibits no tenderness.   Abdominal: Soft. Bowel sounds audible. No distension or tenderness to palp.   Musculoskeletal: Moves all extremities  Neurological: Patient is alert and follows simple commands     Labs  - Old records and notes have been reviewed in [facility]   ABG  Lab Results   Component Value Date/Time    PH 7.36 06/05/2024 02:13 AM    PO2 68 06/05/2024 02:13 AM    PCO2 35 06/05/2024 02:13 AM    HCO3 20 06/05/2024 02:13 AM    O2SAT 93 06/05/2024 02:13 AM     Lab Results   Component Value Date/Time    IFIO2 36 06/05/2024 02:13 AM     CBC  Recent Labs     06/04/24 2040 06/07/24  0430   WBC 11.8* 11.4*   RBC 3.23* 3.06*   HGB 9.3* 8.6*   HCT 30.7* 28.3*   MCV 95.0 92.5   MCH 28.8 28.1   MCHC 30.3* 30.4*   * 464*   MPV 10.9 10.9        BMP  Recent Labs     06/04/24 2040 06/05/24  0141 06/05/24  1855 06/06/24  0525 06/07/24  0430   * 134*  --   --  134*   K 3.9 3.7 3.4* 3.9 3.0*    106  --   --  97*   CO2 21* 20*  --   --  28   BUN 7 5*  --   --  9   CREATININE 0.3* 0.4  --   --  0.5   GLUCOSE 87 81  --   --  124*   MG  --  1.9  --   --   --    CALCIUM 8.4* 7.8*  --   --  8.1*       LFT  Recent Labs     06/04/24 2040   AST 17   ALT 10*   BILITOT 1.3*   ALKPHOS  145*   LIPASE 7.4       TROP  Lab Results   Component Value Date/Time    TROPONINT < 0.010 02/11/2020 07:25 PM     BNP  No results for input(s): \"BNP\" in the last 72 hours.  Lactic Acid  No results for input(s): \"LACTA\" in the last 72 hours.  INR  No results for input(s): \"INR\", \"PROTIME\" in the last 72 hours.  PTT  No results for input(s): \"APTT\" in the last 72 hours.  Glucose  No results for input(s): \"POCGLU\" in the last 72 hours.  UA   Recent Labs     06/05/24  0252 06/06/24  1500   PHUR 5.5  --    COLORU DK YELLOW* YELLOW   PROTEINU TRACE*  --    BLOODU NEGATIVE  --    RBCUA 0-2  --    WBCUA 15-25  --    BACTERIA NONE SEEN  --    NITRU NEGATIVE  --    GLUCOSEU NEGATIVE  --    BILIRUBINUR NEGATIVE  --    UROBILINOGEN 0.2  --    KETUA TRACE*  --    LABCAST 4-8 HYALINE  NONE SEEN  --        PFTs   None on file     Sleep studies       Cultures    PNA Panel negative     Blood cultures pending     Urine culture: Positive for strep anginosis    Echocardiogram      Impression 05/29/24   ([facility]):    Left ventricle is normal in size and global systolic function, LVEF   55-60%. Diastolic function is normal.   Right ventricle is normal in size and systolic function.   Estimated RVSP 36 mmHg assuming RAP of 3 mmHg.   Normal left and right atrial size.   Mildly dilated aortic root, 3.8cm at SoV.   Mild tricuspid regurgitation.   Trivial pericardial effusion.   No prior study available for comparison.   Left Ventricle   Chamber size is normal. Concentric remodeling is present. Normal global systolic function. Regional wall motion is normal. Ejection fraction is normal (55 - 60%). Diastolic function is normal.    Radiology      CTA Chest (06/04/24):     1. Bilateral segmental/subsegmental pulmonary emboli, small clot burden.   No central or saddle pulmonary embolus. No CT evidence for right heart   strain.  2. Small bilateral pleural effusions.  3. Multifocal bilateral airspace and ground-glass opacities suggestive of

## 2024-06-07 NOTE — PROGRESS NOTES
Comprehensive Nutrition Assessment    Type and Reason for Visit:  Initial, Consult (ONS, Unintentional Weight loss, Poor PO intake 5+ days)    Nutrition Recommendations/Plan:   Start appetite stimulant - Perfect Served Dr. Moira todd with and to order  Continue regular diet and encourage adequate PO intake as tolerated  Modify ONS: Ensure PLUS (4x/day) - 2 on lunch tray (pt uses 4 per day PTA) - [place] preferred  Pt to start radiation therapy 6/13/24; last chemo does 5/17/24  Will monitor need for additional nutrition interventions as able and appropriate.      Malnutrition Assessment:  Malnutrition Status:  Severe malnutrition (06/07/24 1157)    Context:  Chronic Illness     Findings of the 6 clinical characteristics of malnutrition:  Energy Intake:  75% or less estimated energy requirements for 1 month or longer (less than 50% since April)  Weight Loss:   (per EMR documentation: 34% loss in 9 months, 42% loss in 14 months; note she has been diuresing this admission but significant losses have occurred this past year)     Body Fat Loss:  Severe body fat loss Orbital, Fat Overlying Ribs, Buccal region, Triceps   Muscle Mass Loss:  Severe muscle mass loss Temples (temporalis), Scapula (trapezius), Clavicles (pectoralis & deltoids), Thigh (quadriceps), Calf (gastrocnemius), Hand (interosseous)  Fluid Accumulation:  No significant fluid accumulation     Strength:  Not Performed    Nutrition Assessment:     Pt. severely malnourished AEB criteria listed above.  At risk for further nutritional compromise r/t Acute hypoxic respiratory failure, HCA PNA, B/L PE, metastatic adenocarcinoma of pancrease - undergoing chemo at [facility] - small amount of ascites and s/p paracentesis 6/6, and underlying medical condition (PMHx: DVT, dysphagia, esophageal dysmotility 2013, HTN, GERD, OA, rheumatoid arthritis, former tobacco use, s/p lumbar fusion 2020).       Nutrition Related Findings:   Pt. Report/Treatments/Miscellaneous: Pt

## 2024-06-07 NOTE — PLAN OF CARE
CRITICAL CARE - PLAN OF CARE      Patient:  Malia Pennington    Unit/Bed:4B-06/006-A  YOB: 1965  MRN: [id number]   PCP: Guido Deleon APRN - CNP  Date of Admission: 6/4/2024  Chief Complaint:- SOB, CP, abdominal pain       57yo F w/significant PMHx of GERD on PPI, RA (on Prednisone 3mg/Sulfasalazine 500 daily, MTX weekly), Chronic Anemia, Lumbar nerve root impingement/cauda equina syndrome s/p lumbar decompression (2020), Suspected ILD/IPF w/recent Hypoxic RF on 2LNC, Metastatic Pancreatic CA - Stage IV (Dx 3/24; Follows w/Dr. 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 Pt reported increasing productive cough / DELGADO, but denied fevers. Has not received Chemo in ~2 weeks and is to start XRT 6/13. Hypoxic on arrival w/SpO2 84% -> improved w/HHFNC; Has remained Tachycardic the entire hospital stay in 110-120’s. BP has been labile w/nadirs in 80’s; 6/5-6/6 pt spiked fevers to 103.7 w/episodic tachypnea in 30-40’s on 6/6.     Pt had soft BP in 70’s systolic in afternoon of 6/7 for which ICU was consulted to evaluate and see. Pt remains tachycardic and intermittently tachypneic w/SpO2 >95% on 1LNC; BP currently 94/64 s/p 3L NS IVF bolus + Midodrine 2.5mg f/b 5mg + Solucortef 50mg (which now prevents running an ACTH stim test in AM). Pt was on Vanc / Zosyn for suspected HAP -> now just Zosyn (MRSA screen -), PCT was elevated, but pt has active cancer which will skew

## 2024-06-07 NOTE — DISCHARGE INSTRUCTIONS
Pulmonary Follow-up Chest X-ray  Go to outpatient radiology at [facility] 2 days prior to your appointment to obtain Chest X-ray for following pleural effusions prior to your appointment this Chest x-ray is done as walk-in procedure no scheduled time is required.

## 2024-06-07 NOTE — PROGRESS NOTES
30.7* 28.3*   * 464*     Recent Labs     06/04/24 2040 06/05/24  0141 06/05/24  1855 06/06/24  0525 06/07/24  0430   * 134*  --   --  134*   K 3.9 3.7 3.4* 3.9 3.0*    106  --   --  97*   CO2 21* 20*  --   --  28   BUN 7 5*  --   --  9   CREATININE 0.3* 0.4  --   --  0.5   CALCIUM 8.4* 7.8*  --   --  8.1*     Recent Labs     06/04/24 2040   AST 17   ALT 10*   BILIDIR 0.4*   BILITOT 1.3*   ALKPHOS 145*     No results for input(s): \"INR\" in the last 72 hours.  No results for input(s): \"CKTOTAL\", \"TROPONINI\" in the last 72 hours.    Microbiology:    Blood culture #1:   Lab Results   Component Value Date/Time    BC No growth 24 hours. No growth 48 hours. 06/04/2024 09:29 PM       Blood culture #2:No results found for: \"BLOODCULT2\"    Organism:  Lab Results   Component Value Date/Time    ORG Carey albicans 06/05/2024 02:19 AM         Lab Results   Component Value Date/Time    LABGRAM  06/06/2024 03:00 PM     Many segmented neutrophils observed. No organisms observed. performed on cytospun specimen       MRSA culture only:No results found for: \"MRSAC\"    Urine culture: No results found for: \"LABURIN\"    Respiratory culture: No results found for: \"CULTRESP\"    Aerobic and Anaerobic :  No results found for: \"LABAERO\"  Lab Results   Component Value Date/Time    LABANAE No growth-preliminary  No growth   09/16/2017 09:27 AM       Urinalysis:      Lab Results   Component Value Date/Time    NITRU NEGATIVE 06/05/2024 02:52 AM    WBCUA 15-25 06/05/2024 02:52 AM    BACTERIA NONE SEEN 06/05/2024 02:52 AM    RBCUA 0-2 06/05/2024 02:52 AM    BLOODU NEGATIVE 06/05/2024 02:52 AM    GLUCOSEU NEGATIVE 06/05/2024 02:52 AM       Radiology:  US GUIDED PARACENTESIS   Final Result      Successful ultrasound-guided paracentesis. 0.6 L of fluid drained.               **This report has been created using voice recognition software. It may contain   minor errors which are inherent in voice recognition technology.**             Diffuse bilateral interstitial and multifocal bilateral airspace opacities    secondary to infection or edema. Underlying metastatic disease is included    in the differential.      This document has been electronically signed by: Patricia Carlton DO on    06/04/2024 09:20 PM        US RENAL LIMITED    Result Date: 6/5/2024  RENAL ULTRASOUND: TECHNIQUE: Transabdominal multiplanar images of the kidneys were obtained. COMPARISON: CT/SR - CT ABDOMEN PELVIS W IV CONTRAST - 06/04/2024 09:51 PM EDT FINDINGS: The kidneys are relatively symmetric in appearance. Mild pelvicalyceal dilatation, similar to CT scan performed yesterday, not increased significantly. No renal cortical abnormality is appreciated. Urinary bladder volume measures 117 mL. This is labeled pre void. No postvoid residual was obtained. No focal bladder abnormality or stone appreciated. Ascites again noted.     1. Subtle pelvicalyceal dilatation consistent with mild hydronephrosis, does not appear increased compared to yesterdays CT scan. 2. No urinary bladder distention is suspected. 3. Ascites again noted. This document has been electronically signed by: Mario Bellamy MD on 06/05/2024 06:29 AM    CT ABDOMEN PELVIS W IV CONTRAST Additional Contrast? None    Result Date: 6/4/2024  CT ABDOMEN AND PELVIS WITH CONTRAST Comparison: CT/KO/SR - CT ABDOMEN PELVIS W IV CONTRAST - 03/11/2024 09:36 AM EDT Findings: Please see separate report for CTA chest. Redemonstration of multiple tiny hepatic hypodensities which are too small to accurately characterize. No enhancing liver lesion, splenomegaly or adrenal mass. Cystic lesion in the tail of the pancreas measures 2.7 x 2.3 cm previously 2.6 x 2.2 cm. Unchanged peripancreatic lymphadenopathy. Persistent mild left hydronephrosis. Redemonstration of several cysts in the left kidney. Gallbladder unremarkable. Atherosclerotic changes throughout the aorta. No AAA. Moderate amount of ascites in the abdomen. Large

## 2024-06-07 NOTE — PLAN OF CARE
Problem: Discharge Planning  Goal: Discharge to home or other facility with appropriate resources  6/6/2024 2216 by Ann Choudhury RN  Outcome: Progressing  Flowsheets (Taken 6/6/2024 2030)  Discharge to home or other facility with appropriate resources: Identify barriers to discharge with patient and caregiver  6/6/2024 1056 by Melissa Black RN  Outcome: Progressing  Flowsheets (Taken 6/6/2024 1056)  Discharge to home or other facility with appropriate resources:   Identify barriers to discharge with patient and caregiver   Identify discharge learning needs (meds, wound care, etc)     Problem: Pain  Goal: Verbalizes/displays adequate comfort level or baseline comfort level  6/6/2024 2216 by nAn Choudhury RN  Outcome: Progressing  6/6/2024 1056 by Melissa Black RN  Outcome: Progressing  Flowsheets (Taken 6/6/2024 1056)  Verbalizes/displays adequate comfort level or baseline comfort level:   Encourage patient to monitor pain and request assistance   Administer analgesics based on type and severity of pain and evaluate response     Problem: Safety - Adult  Goal: Free from fall injury  6/6/2024 2216 by Ann Choudhury RN  Outcome: Progressing  6/6/2024 1056 by Melissa Black RN  Outcome: Progressing  Flowsheets (Taken 6/6/2024 1056)  Free From Fall Injury: Instruct family/caregiver on patient safety     Problem: Chronic Conditions and Co-morbidities  Goal: Patient's chronic conditions and co-morbidity symptoms are monitored and maintained or improved  6/6/2024 2216 by Ann Choudhury RN  Outcome: Progressing  Flowsheets (Taken 6/6/2024 2030)  Care Plan - Patient's Chronic Conditions and Co-Morbidity Symptoms are Monitored and Maintained or Improved: Monitor and assess patient's chronic conditions and comorbid symptoms for stability, deterioration, or improvement  6/6/2024 1056 by Melissa Black RN  Outcome: Progressing  Flowsheets (Taken 6/6/2024 1056)  Care Plan - Patient's Chronic Conditions and

## 2024-06-07 NOTE — PROGRESS NOTES
This nurse reached out to Dr. Dwayne Herrera with [facility] for obtain PET CT scan images.  [facility] to fax over images.

## 2024-06-08 NOTE — PLAN OF CARE
Problem: Pain  Goal: Verbalizes/displays adequate comfort level or baseline comfort level  6/8/2024 0900 by Jun Gallo RN  Outcome: Progressing  6/7/2024 2128 by Ada Pascual RN  Flowsheets (Taken 6/7/2024 2128)  Verbalizes/displays adequate comfort level or baseline comfort level:   Encourage patient to monitor pain and request assistance   Assess pain using appropriate pain scale   Administer analgesics based on type and severity of pain and evaluate response   Implement non-pharmacological measures as appropriate and evaluate response     Problem: Chronic Conditions and Co-morbidities  Goal: Patient's chronic conditions and co-morbidity symptoms are monitored and maintained or improved  6/8/2024 0900 by Jun Gallo RN  Outcome: Progressing  6/7/2024 2128 by Ada Pascual RN  Flowsheets (Taken 6/7/2024 1945)  Care Plan - Patient's Chronic Conditions and Co-Morbidity Symptoms are Monitored and Maintained or Improved:   Monitor and assess patient's chronic conditions and comorbid symptoms for stability, deterioration, or improvement   Collaborate with multidisciplinary team to address chronic and comorbid conditions and prevent exacerbation or deterioration   Update acute care plan with appropriate goals if chronic or comorbid symptoms are exacerbated and prevent overall improvement and discharge     Problem: Respiratory - Adult  Goal: Achieves optimal ventilation and oxygenation  6/8/2024 0900 by Jun Gallo RN  Outcome: Progressing  6/7/2024 2128 by Ada Pascual RN  Flowsheets (Taken 6/7/2024 1945)  Achieves optimal ventilation and oxygenation:   Assess for changes in respiratory status   Assess for changes in mentation and behavior   Position to facilitate oxygenation and minimize respiratory effort   Respiratory therapy support as indicated     Problem: Nutrition Deficit:  Goal: Optimize nutritional status  6/8/2024 0900 by Jun Gallo RN  Outcome: Progressing  6/7/2024 2128 by Ankur

## 2024-06-08 NOTE — CONSULTS
CRITICAL CARE PROGRESS NOTE      Patient:  Malia Pennington    Unit/Bed:4B-06/006-A  YOB: 1965  MRN: [id number]   PCP: Guido Deleon APRN - CNP  Date of Admission: 6/4/2024  Chief Complaint:-Shortness of breath/abdominal pain    Assessment and Plan:    Acute on chronic hypoxic respiratory failure: Multifactorial, most likely due to bilateral submassive pulmonary emboli, metastatic disease to the lungs currently undergoing chemotherapy, soon to start radiation therapy, and Candida albicans growing on respiratory culture.  Initially required HFNC on this admission, has since been weaned to 2 LNC.  Continue O2 supplementation, wean as tolerated  Low suspicion for bacterial PNA  Concern for hospital-acquired pneumonia given recent hospital admission.  However blood cultures, Legionella antigen, MRSA PCR, pneumonia panel all negative.  Procal 0.27.  Lesions throughout the lungs likely more related to metastases or chemotherapy induced pneumonitis instead of infectious process  Continue nystatin  Pulmonology consulted, appreciate recommendations  Acute submassive bilateral PE: First seen on CT chest 5/20/2024 per chart review.  Is again seen on CTA chest 6/4/2024.  Patient insistently tachycardic.  Patient on Eliquis 5 mg BID.  Likely 2/2 hypercoagulable state in the setting of stage IV pancreatic adenocarcinoma.  Hypotension: Patient developed hypotension yesterday 6/7, lowest blood pressure 74/51.  Given 3 L fluid bolus.  Mild improvement in blood pressure that time.  Also got dose of albumin.  She was also given dose of Solu-Cortef by hospitalist team.  Patient started on midodrine, received first 10 mg dose morning of 6/8/2024.  Will check cortisol, however result may likely be skewed she got steroids yesterday.  TSH elevated, T4 WNL.  Stage IV pancreatic adenocarcinoma: Mets to bone, lung, liver, and omentum.  Diagnosed March 2024.  Follows at [facility] in [place].  No brain mets.  On  6/8 = 61/7775.  Net -1.5 L.  Body mass index is 17.68 kg/m².   GCS:   15  General:   Ill-appearing, cachectic, -American female, no family bedside  HEENT:  normocephalic and atraumatic.  No scleral icterus. PERR  Neck: supple.  No Thyromegaly.  Lungs: Inspiratory crackles in bilateral bases and midlung fields.  No retractions.  Normal effort.  Mild coughing with deep breaths.  Cardiac: Regular rhythm, tachycardia.  No JVD.  Abdomen: soft.  Nontender.  Extremities:  No clubbing, cyanosis, or edema x 4.    Vasculature: capillary refill < 3 seconds. Palpable dorsalis pedis pulses.  Skin:  warm and dry.  Psych:  Alert and oriented x3.  Affect appropriate  Lymph:  No supraclavicular adenopathy.  Neurologic:  No focal deficit. No seizures.    Data: (All radiographs, tracings, PFTs, and imaging are personally viewed and interpreted unless otherwise noted).   BMP 6/8/2024: Sodium 138, potassium 4.5, chloride 105, bicarb 25, BUN 10, creatinine 0.5  Phosphorus 6/8/2024: 1.6  HFP 6/8/2024: Albumin 3.1, alk phos 127, ALT <5, AST 12, total bilirubin 0.6  CBC 6/8/2024: WBC 10.4, hemoglobin 7.5, Cruz crit 25.2, platelets 422  Ascitic fluid: Nucleated cells 6278  Telemetry shows        Seen with multidisciplinary ICU team.  Meets Continued ICU Level Care Criteria:    [x] Yes   [] No - Transfer Planned to listed location:  [] HOSPITALIST CONTACTED-      Case and plan discussed with Dr. Massey.      Electronically Signed by  Abdon Meraz DO, MBA  PGY-1 Internal Medicine Resident  [facility]  On 6/8/2024 at 8:21 AM  CRITICAL CARE SPECIALIST    This report has been created using voice recognition software. It may contain minor errors which are inherent in voice recognition technology.

## 2024-06-08 NOTE — PLAN OF CARE
Problem: Discharge Planning  Goal: Discharge to home or other facility with appropriate resources  Flowsheets (Taken 6/7/2024 1945)  Discharge to home or other facility with appropriate resources:   Identify barriers to discharge with patient and caregiver   Arrange for needed discharge resources and transportation as appropriate   Identify discharge learning needs (meds, wound care, etc)   Refer to discharge planning if patient needs post-hospital services based on physician order or complex needs related to functional status, cognitive ability or social support system     Problem: Pain  Goal: Verbalizes/displays adequate comfort level or baseline comfort level  Flowsheets (Taken 6/7/2024 2128)  Verbalizes/displays adequate comfort level or baseline comfort level:   Encourage patient to monitor pain and request assistance   Assess pain using appropriate pain scale   Administer analgesics based on type and severity of pain and evaluate response   Implement non-pharmacological measures as appropriate and evaluate response     Problem: Safety - Adult  Goal: Free from fall injury  Flowsheets (Taken 6/7/2024 2128)  Free From Fall Injury:   Instruct family/caregiver on patient safety   Based on caregiver fall risk screen, instruct family/caregiver to ask for assistance with transferring infant if caregiver noted to have fall risk factors     Problem: Chronic Conditions and Co-morbidities  Goal: Patient's chronic conditions and co-morbidity symptoms are monitored and maintained or improved  Flowsheets (Taken 6/7/2024 1945)  Care Plan - Patient's Chronic Conditions and Co-Morbidity Symptoms are Monitored and Maintained or Improved:   Monitor and assess patient's chronic conditions and comorbid symptoms for stability, deterioration, or improvement   Collaborate with multidisciplinary team to address chronic and comorbid conditions and prevent exacerbation or deterioration   Update acute care plan with appropriate goals if

## 2024-06-09 NOTE — PLAN OF CARE
Patient received as transfer out of the ICU.  She was transferred to the ICU for hypotension.  She is not currently on midodrine and her blood pressures have been stable.  She continues on antibiotics due to concern for intra-abdominal infection.  Continue current plan of care.  Hospitalist will assume care.    Electronically signed by Kev Hoffman PA-C on 6/9/24 at 7:43 PM EDT

## 2024-06-09 NOTE — PLAN OF CARE
Problem: Pain  Goal: Verbalizes/displays adequate comfort level or baseline comfort level  Outcome: Progressing  Flowsheets (Taken 6/8/2024 2030 by Yvette Alas)  Verbalizes/displays adequate comfort level or baseline comfort level:   Encourage patient to monitor pain and request assistance   Assess pain using appropriate pain scale   Administer analgesics based on type and severity of pain and evaluate response   Implement non-pharmacological measures as appropriate and evaluate response   Consider cultural and social influences on pain and pain management   Notify Licensed Independent Practitioner if interventions unsuccessful or patient reports new pain     Problem: Safety - Adult  Goal: Free from fall injury  Outcome: Progressing  Flowsheets (Taken 6/8/2024 2219 by Yvette Alas)  Free From Fall Injury: Instruct family/caregiver on patient safety     Problem: Chronic Conditions and Co-morbidities  Goal: Patient's chronic conditions and co-morbidity symptoms are monitored and maintained or improved  Outcome: Progressing  Flowsheets (Taken 6/8/2024 2030 by Yvette Alas)  Care Plan - Patient's Chronic Conditions and Co-Morbidity Symptoms are Monitored and Maintained or Improved:   Monitor and assess patient's chronic conditions and comorbid symptoms for stability, deterioration, or improvement   Collaborate with multidisciplinary team to address chronic and comorbid conditions and prevent exacerbation or deterioration   Update acute care plan with appropriate goals if chronic or comorbid symptoms are exacerbated and prevent overall improvement and discharge     Problem: Respiratory - Adult  Goal: Achieves optimal ventilation and oxygenation  Outcome: Progressing  Flowsheets (Taken 6/8/2024 2030 by Yvette Alas)  Achieves optimal ventilation and oxygenation:   Assess for changes in respiratory status   Assess for changes in mentation and behavior   Position to facilitate oxygenation and  minimize respiratory effort   Oxygen supplementation based on oxygen saturation or arterial blood gases   Encourage broncho-pulmonary hygiene including cough, deep breathe, incentive spirometry   Assess the need for suctioning and aspirate as needed   Assess and instruct to report shortness of breath or any respiratory difficulty   Respiratory therapy support as indicated     Problem: Nutrition Deficit:  Goal: Optimize nutritional status  Outcome: Progressing     Problem: ABCDS Injury Assessment  Goal: Absence of physical injury  Outcome: Progressing  Flowsheets (Taken 6/8/2024 2221 by Yvette Alas)  Absence of Physical Injury: Implement safety measures based on patient assessment

## 2024-06-09 NOTE — PROGRESS NOTES
CRITICAL CARE PROGRESS NOTE      Patient:  Malia Pennington    Unit/Bed:4B-06/006-A  YOB: 1965  MRN: [id number]   PCP: Guido Deleon APRN - CNP  Date of Admission: 6/4/2024  Chief Complaint:- shortness of breath and abdominal pain    Assessment and Plan:    Acute on chronic hypoxic respiratory failure, improving:  multifactorial, with associated factors of bilateral submassive pulmonary emboli, pancreatic cancer with metastasis to lungs on chemotherapy with plans for radiation therapy, and Candida albicans noted on respiratory culture. Procal 0.27. Initially required HHFNC on admission, but has been weaned back to baseline oxygen use of 2L nasal canula.  Pneumonia panel negative, blood cultures NGTD, Legionella antigen negative.  CTA chest noted for multifocal ground-glass opacities that are likely metastatic versus infectious.  Respiratory culture positive for Candida albicans. Continue nystatin.  Patient to follow-up with outpatient pulmonary clinic in 6 weeks for repeat CXR and discussion about PFT at that time.  Acute submassive bilateral PE, iso hypercoagulable state 2/2 pancreatic adenocarcinoma:  previously noted on 5/20/2024, started on apixaban. CTA chest on 6/4/24 noted for continued bilatearl segment/subsegmental pulmonary emboli.   Hypotension, improving:  Patient transfer to ICU given hypotension and concerns for pressor requirement. Patient improved s/p IVF bolus, albumin, Solu-cortef, and midodrine.   Will continue patient on midodrine 10mg at this time.  Plan to transfer out of ICU to stepdown given improvement in blood pressure.  Stage IV pancreatic adenocarcinoma:  Diagnosed 3/2024. Mets to bone, lung liver, and omentum noted. Seen at [facility]. Currently on gemcitabine and paclitaxel, most recent treatment was on 5/17/2024, with plans for radiation therapy to start 6/13/2024.  Ascites, s/p paracentesis:  Paracentesis performed on 6/6 and noted for 6278 nucleated cells

## 2024-06-10 NOTE — PLAN OF CARE
Problem: Discharge Planning  Goal: Discharge to home or other facility with appropriate resources  6/10/2024 0721 by Ina Max RN  Outcome: Progressing  Flowsheets (Taken 6/10/2024 0721)  Discharge to home or other facility with appropriate resources:   Identify barriers to discharge with patient and caregiver   Arrange for needed discharge resources and transportation as appropriate     Problem: Pain  Goal: Verbalizes/displays adequate comfort level or baseline comfort level  6/10/2024 0721 by Ina Max RN  Outcome: Progressing  Flowsheets (Taken 6/10/2024 0721)  Verbalizes/displays adequate comfort level or baseline comfort level:   Encourage patient to monitor pain and request assistance   Administer analgesics based on type and severity of pain and evaluate response   Assess pain using appropriate pain scale     Problem: Safety - Adult  Goal: Free from fall injury  6/10/2024 0721 by Ina Max RN  Outcome: Progressing  Flowsheets (Taken 6/10/2024 0721)  Free From Fall Injury: Instruct family/caregiver on patient safety     Problem: Chronic Conditions and Co-morbidities  Goal: Patient's chronic conditions and co-morbidity symptoms are monitored and maintained or improved  6/10/2024 0721 by Ina Max RN  Outcome: Progressing  Flowsheets (Taken 6/10/2024 0721)  Care Plan - Patient's Chronic Conditions and Co-Morbidity Symptoms are Monitored and Maintained or Improved:   Monitor and assess patient's chronic conditions and comorbid symptoms for stability, deterioration, or improvement   Collaborate with multidisciplinary team to address chronic and comorbid conditions and prevent exacerbation or deterioration   Update acute care plan with appropriate goals if chronic or comorbid symptoms are exacerbated and prevent overall improvement and discharge     Problem: Respiratory - Adult  Goal: Achieves optimal ventilation and oxygenation  6/10/2024 0721 by Ina Max,

## 2024-06-10 NOTE — CARE COORDINATION
Pt transferred to ICU then 4A, handoff report given to ELVIA Quezada CM.    Electronically signed by Valerie Green RN on 6/10/2024 at 7:08 AM

## 2024-06-10 NOTE — PROGRESS NOTES
Hospitalist Progress Note      Patient:  Malia Pennington    Unit/Bed:4A-10/010-A  YOB: 1965  MRN: [id number]   Acct: [id number]   PCP: Guido Deleon APRN - CNP  Date of Admission: 6/4/2024    Assessment/Plan:  #Acute hypoxic respiratory failure POA.Most likely secondary to multiple PE,HCAP,metastaic disease and pleural effusion.  -CT thorax done showed  Bilateral segmental/subsegmental pulmonary emboli, small clot burden. No central or saddle pulmonary embolus. No CT evidence for right heart   strain.Small bilateral pleural effusions.Multifocal bilateral airspace and ground-glass opacities suggestive of   inflammatory or infectious process. Metastatic disease is included in the differential.Progressive osseous metastatic disease. No pathologic compression fracture in the thoracic spine.  -COVID,Influenza A&B negative,Pneumonia panel negative,MRSA negative.Sputum culture pending.  -Will discontinue Vancomycin.Continue with Zosyn.  -Patient was discharged from [facility] on 2l nc.Was on high flow and now weaned down to 3-5l.  -Wean as tolerated.Will repeat chest Xray.  -TTE done showed left ventricle is normal in size and global systolic function, LVEF 55-60%. Diastolic function is normal.   -Patient was intimally on Lovenox and now transitioned to Eliquis.Will continue.  -Respiratory culture positive for candida albicans.ICU tream started on Nystatin.  -Pulmonology following.    #HCA Pneumonia.  -Pneumonia panel ,MRSA ,COVID,Influenza A&B negative.  -Continue with Zosyn.Vancomycin discontinued.  -Resp. Panel positive for candida albicans.Could be from patient on prednisone.Now on Nystatin.    #Hypotension.  -Patient's Blood pressure has been running low.  -Was chronically on prednisone.Held.  -Patient received 3 liters of Bolus and was given a dose of hydrocortisone and started on midodrine.  -Patient was transferred to ICU for possible  peripancreatic lymphadenopathy. Persistent mild left hydronephrosis. Redemonstration of several cysts in the left kidney. Gallbladder unremarkable. Atherosclerotic changes throughout the aorta. No AAA. Moderate amount of ascites in the abdomen. Large volume ascites in the pelvis. No bowel obstruction or ileus. No pneumatosis or pneumoperitoneum. Large colonic stool burden. Numerous sigmoid diverticula with no definite acute diverticulitis. Appendix and uterus unremarkable. L3-S1 posterior fusion. Osteoblastic metastases in T9, T11 and T12.     1. Moderate-to-large volume abdominal and pelvic ascites. 2. No small bowel obstruction or ileus. 3. Persistent mild left hydronephrosis. No definite obstructing calculus. 4. Stable cystic mass in the pancreatic tail. 5. Multiple osseous metastases. This document has been electronically signed by: Patricia Carlton DO on 06/04/2024 10:52 PM All CTs at this facility use dose modulation techniques and iterative reconstructions, and/or weight-based dosing when appropriate to reduce radiation to a low as reasonably achievable.    CTA CHEST W WO CONTRAST    Result Date: 6/4/2024   CHEST WITH CONTRAST. 3D POSTPROCESSING. Comparison: CT/KO/SR - CT CHEST W CONTRAST - 03/11/2024 09:36 AM EDT Findings: The heart is normal size. RV/LV ratio is normal. Small pericardial effusion. Right taryn catheter tip is in the right atrium. Atherosclerotic changes. No thoracic aortic aneurysm or dissection. There are filling defects in the bilateral upper, right middle and bilateral lower lobe segmental and subsegmental pulmonary arteries. No central or saddle embolus. No thyromegaly or lymphadenopathy. Small bilateral pleural effusions. No pneumothorax. Multiple bilateral irregular airspace and ground-glass opacities, right greater than left. Emphysematous and bronchiectatic changes. Larger sclerotic lesions in T9, T11 and T12. Please see separate report for CT abdomen and pelvis.     1. Bilateral

## 2024-06-10 NOTE — CARE COORDINATION
6/10/24, 2:51 PM EDT    DISCHARGE ON GOING EVALUATION    Malia BELEN [facility] day: 5  Location: 4A-10/010-A Reason for admit: Septicemia (HCC) [A41.9]  Acute respiratory failure with hypoxia (HCC) [J96.01]  Acute on chronic respiratory failure with hypoxia (HCC) [J96.21]  Pneumonia of right middle lobe due to infectious organism [J18.9]  Pneumonia of right lower lobe due to infectious organism [J18.9]     Procedures:   6/6 US guided paracentesis: Successful ultrasound-guided paracentesis. 0.6 L of fluid drained.        Imaging since last note:   6/10 CXR 1. Interstitial infiltrates are seen throughout the lungs bilaterally. This   finding has worsened since the prior study.   2. Small bilateral pleural effusions, right greater than left.       Barriers to Discharge: diabetes management, Pulmonology, Dietician following, pain and nausea control, Midodrine, IV Zosyn.    PCP: Guido Deleon, FABIAN - CNP  Readmission Risk Score: 15.7    Patient Goals/Plan/Treatment Preferences: Plans home with spouse. Has DME, current home O2 through [employer].

## 2024-06-11 PROBLEM — J90 PLEURAL EFFUSION ON RIGHT: Status: ACTIVE | Noted: 2024-01-01

## 2024-06-11 NOTE — PROGRESS NOTES
[facility]      Patient - Malia Pennington   MRN -  [id number]   [facility] # - [id number]   - 1965      Date of Admission -  2024  8:22 PM  Date of evaluation -  2024  Room - 4A-10/010-A   Hospital Day - 6  Consulting - Chirag Pizarro MD Primary Care Physician - Guido Deleon APRN - CNP     Problem List      Active Hospital Problems    Diagnosis Date Noted    Severe malnutrition (HCC) [E43] 2024    Nosocomial pneumonia [J18.9, Y95] 2024    Acute respiratory distress syndrome (ARDS) (HCC) [J80] 2024    History of pulmonary embolus (PE) [Z86.711] 2024    Pancreatic cancer (HCC) [C25.9] 2024    Acute on chronic respiratory failure with hypoxia (HCC) [J96.21] 2024    Immunosuppressed status (HCC) [D84.9]     Rheumatoid arthritis (HCC) [M06.9] 07/10/2019    Tobacco abuse [Z72.0] 2013    GERD (gastroesophageal reflux disease) [K21.9] 2013     Reason for Consult    Right-sided pleural effusion, increased oxygen demand  HPI     Malia Pennington is a 58 y.o. female with hx RA (on MTX, sulfasalazine, prednisone), pancreatic adenocarcinoma Stage IV (mets to bone, omentum, lungs) who was admitted overnight  for AHRF. Found to have bilateral PE last week at [facility]. She was discharged on 2 LPM supplemental oxygen and Eliquis (previously on room air).She was dignosed with metastatic pancreatic cancer 2024, following with [facility] at [facility]. Her SOB has progressively worsened linear fashion in recent months until discharge at [facility] few days ago. Here has required HHFNC FiO2 40%, 30 LPM. She is on chemo with Gemcitabine and Paclitaxel. Last received 24. Echo at [facility] from  with normal EF, no RHS. Pneumonia panel negative. She was started on Vanc/Zosyn and subsequently admitted.        Past 24 hrs   Patient was seen and examined at the bedside.  Patient complaining of worsening shortness of breath.  Now on 5 L

## 2024-06-11 NOTE — PROGRESS NOTES
Hospitalist Progress Note      Patient:  Malia Pennington    Unit/Bed:4A-10/010-A  YOB: 1965  MRN: [id number]   Acct: [id number]   PCP: Guido Deleon APRN - CNP  Date of Admission: 6/4/2024    Assessment/Plan:  #Acute hypoxic respiratory failure POA.Most likely secondary to multiple PE,HCAP,metastaic disease and pleural effusion.  -CT thorax done showed  Bilateral segmental/subsegmental pulmonary emboli, small clot burden. No central or saddle pulmonary embolus. No CT evidence for right heart   strain.Small bilateral pleural effusions.Multifocal bilateral airspace and ground-glass opacities suggestive of   inflammatory or infectious process. Metastatic disease is included in the differential.Progressive osseous metastatic disease. No pathologic compression fracture in the thoracic spine.  -COVID,Influenza A&B negative,Pneumonia panel negative,MRSA negative.Sputum culture pending.  -Will discontinue Vancomycin.Continue with Zosyn.  -Patient was discharged from [facility] on 2l nc.Was on high flow and now weaned down to 3-5l.  -Wean as tolerated.Repeat chest Xray showed worsening infiltrates.  -Can not give her diuretics due to soft BP.She already on high dose of midodrine.She probably needs pressor support.ICU consulted and accepted the patient.  -I have contacted the [facility] for transfer.They currently have no bed available.  -TTE done showed left ventricle is normal in size and global systolic function, LVEF 55-60%. Diastolic function is normal.   -Patient was intimally on Lovenox and now transitioned to Eliquis.Will continue.  -Respiratory culture positive for candida albicans.ICU tream started on Nystatin.  -Pulmonology following.    #HCA Pneumonia.  -Pneumonia panel ,MRSA ,COVID,Influenza A&B negative.  -Continue with Zosyn.Vancomycin discontinued.  -Resp. Panel positive for candida albicans.Could be from patient on prednisone.Now on  Nystatin.    #Hypotension.  -Patient's Blood pressure has been running low.  -Was chronically on prednisone.Held.  -Patient received 3 liters of Bolus and was given a dose of hydrocortisone and started on midodrine.  -Patient was transferred to ICU for possible pressor support.  -In ICU patient was given more IV boluses and albumin and was continued on solu-cortef and midodrine and was transferred back to medical floor.  -Blood pressure still soft with Map of 65 and HR is around 109-115.Will monitor.    #B/L PE.  -Continue with eliquis.    #Metastatic adenocarcinoma of pancreas(Biopsy proven).  -Patient has been going chemotherapy at [facility].Last treatment on 05/17/2024,was due for another treatment on 05/29/2024 that was rescheduled.  -Whole body PET scan 3/11/2024 abnormal radiotracer accumulation at the left anterior eighth rib in the T11 and T12 were vertebral highly suspicious for osseous metastatic disease .  -MRI abdomen 3/11/2024 small amount of ascites. 5/24/24 CT abdomen and pelvis there is peritoneal carcinomatosis moderate volume ascites.  Mild bilateral hydronephrosis.  Delayed left nephrogram compatible with left obstructive uropathy probably related to peritoneal/serosal carcinomatosis. moderate volume ascites.  Mild bilateral hydronephrosis.  Delayed left nephrogram compatible with left obstructive uropathy probably related to peritoneal/serosal carcinomatosis.  No significant interval change in the cystic mass in the pink creatinine body.    --Radiation oncology was consulted and they are planing to do 5 treatments.   -Patient had paracentesis done yesterday,labs negative for bacterial peritonitis.Culture negative to date.  -Contacted [facility] for transfer.No beds available at this time.    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   -Continue with eliquis.    #Mild bilateral hydronephrosis.  -  Recommendations per urology if urinary retention was noted consider Ambriz

## 2024-06-11 NOTE — PROGRESS NOTES
Patient arrived to unit from  via bed. Patient transferred to ICU bed and placed on continuous ICU bedside monitor. Patient admitted for Septicemia (HCC) [A41.9]  Acute respiratory failure with hypoxia (HCC) [J96.01]  Acute on chronic respiratory failure with hypoxia (HCC) [J96.21]  Pneumonia of right middle lobe due to infectious organism [J18.9]  Pneumonia of right lower lobe due to infectious organism [J18.9]. Vitals obtained. See flowsheets. Patient's IV access includes see flowsheet. Current infusions and rates of infusion include KVO. Assessment completed by John EWING RN. Two nurse skin assessment completed by John EWING and Urmila BOYD. See flowsheets for assessment details. Policies and procedures of ICU able to be explained to patient at this time. Family member(s)/representative(s) present at time of admission include spouse, Fred. Patient rights explained to family member(s)/representatives and patient, as able. Patient/patient's family member(s)/representative(s) N/A to have physician notified of their admission. All questions posed by patient's family member(s)/representative(s) and patient answered at this time.

## 2024-06-11 NOTE — CARE COORDINATION
6/11/24, 1:35 PM EDT    DISCHARGE ON GOING EVALUATION    Malia GLOVER [facility] day: 6  Location: -01/001-A Reason for admit: Septicemia (HCC) [A41.9]  Acute respiratory failure with hypoxia (HCC) [J96.01]  Acute on chronic respiratory failure with hypoxia (HCC) [J96.21]  Pneumonia of right middle lobe due to infectious organism [J18.9]  Pneumonia of right lower lobe due to infectious organism [J18.9]     Procedures:   6/6 US guided paracentesis: Successful ultrasound-guided paracentesis. 0.6 L of fluid drained.     Imaging since last note: none    Barriers to Discharge:  diabetes management, Pulmonology, Dietician following, pain and nausea control, Midodrine, IV Zosyn. O2 at 5 liters nasal cannula with saturations at 94%. Pulmonology consult, IV Bumex, Midodrine, pain and nausea control, IV Zosyn, electrolyte replacement protocols.     PCP: Guido Deleon, FABIAN - CNP  Readmission Risk Score: 12.2    Patient Goals/Plan/Treatment Preferences:  Plans home with spouse. Has DME, current home O2 through [employer].  Will follow.

## 2024-06-11 NOTE — PLAN OF CARE
Yes RN  Outcome: Progressing  Flowsheets (Taken 6/10/2024 0721)  Achieves optimal ventilation and oxygenation:   Assess for changes in respiratory status   Assess for changes in mentation and behavior     Problem: Nutrition Deficit:  Goal: Optimize nutritional status  6/10/2024 0721 by Ina Max, RN  Outcome: Progressing  Flowsheets (Taken 6/10/2024 0721)  Nutrient intake appropriate for improving, restoring, or maintaining nutritional needs:   Assess nutritional status and recommend course of action   Monitor oral intake, labs, and treatment plans     Problem: ABCDS Injury Assessment  Goal: Absence of physical injury  6/10/2024 0721 by Ina Max, RN  Outcome: Progressing  Flowsheets (Taken 6/10/2024 0721)  Absence of Physical Injury: Implement safety measures based on patient assessment

## 2024-06-11 NOTE — CONSULTS
CRITICAL CARE PROGRESS NOTE      Patient:  Malia Pennington    Unit/Bed:4B-01/001-A  YOB: 1965  MRN: [id number]   PCP: Guido Deleon APRN - CNP  Date of Admission: 6/4/2024  Chief Complaint:- Shortness of breath    Assessment and Plan:    Acute on chronic hypoxic respiratory failure: Multifactorial in setting of bilateral submassive pulmonary emboli, metastatic disease to the lungs currently undergoing chemotherapy (chemotherapy induced pneumonitis?), Candida albicans on respiratory culture, fluid overload with bilateral pleural effusions and significantly elevated BNP (1690 on 6/4, repeat 17,569 on 6/11).  Unable to diurese 2/2 hypotension.  Will start IV diuresis, may require pressors if blood pressure cannot tolerate it  Continue nasal cannula, increase supplementation as necessary  Strict I's and O's/daily weights  Low-sodium/fluid restricted diet  Patient on nystatin as ordered by pulmonology team.  Acute submassive bilateral PE: First seen on CT chest 5/2012 24 per chart review.  Seen on CT chest again 6/4/2024.  Patient consistently tachycardic.  On Eliquis 5 mg twice daily.  Likely 2/2 hypercoagulable state in setting of stage IV pancreatic adenocarcinoma.  Unlikely progression of PE since patient has been on therapeutic anticoagulation.  Hypotension: Patient has been having issues with hypotension during this admission.  Was recently in the ICU, and went to stepdown 6/9 after blood pressure was stable.  Patient developed worsening shortness of breath and now CXR showing interstitial edema and R sided pleural effusion.   Leukocytosis: Could be explained by metastatic adenocarcinoma.  Abdominal pain could be explained by that as well.  Patient has been on Zosyn for past 8 days.  Blood cultures/pneumonia panel/ascitic fluid/Legionella/MRSA PCR have all been negative.  Respiratory cultures did show Candida albicans.  Patient did not become febrile.  Can consider repeat cultures if becomes  18.65 kg/m².   GCS:   15  General:   Acute on chronically ill-appearing.  Very thin.  No family bedside.  Speaking in full sentences.  HEENT:  normocephalic and atraumatic.  No scleral icterus. PERR  Neck: supple.  No Thyromegaly.  Lungs: Decreased breath sounds bases, mild crackles.  No wheezing appreciated.  No retractions. mild tachypnea.  Cardiac:.  Tachycardia, regular rhythm.  No JVD.  Abdomen: soft.  Mildly tender.  Extremities:  No clubbing, cyanosis, or edema x 4.    Vasculature: capillary refill < 3 seconds. Palpable dorsalis pedis pulses.  Skin:  warm and dry.  Psych:  Alert and oriented x3.  Affect appropriate  Lymph:  No supraclavicular adenopathy.  Neurologic:  No focal deficit. No seizures.    Data: (All radiographs, tracings, PFTs, and imaging are personally viewed and interpreted unless otherwise noted).   BMP 6/11/2024: Sodium 139, potassium 4.8, chloride 106, bicarb 22, BUN 9, creatinine 0.6, AG 11  BNP 6/11/2024: 17,569  WBC: WBC 19, hemoglobin 8.9, hematocrit 30.1, platelets 571  Telemetry shows sinus tachycardia  CXR 6/7/2024: Interstitial edema through bilateral lungs.  Right pleural effusion.    Seen with multidisciplinary ICU team.  Meets Continued ICU Level Care Criteria:    [x] Yes   [] No - Transfer Planned to listed location:  [] HOSPITALIST CONTACTED-      Case and plan discussed with Dr. Arango.      Electronically Signed by  Abdon Meraz DO, MBA  PGY-1 Internal Medicine Resident  [facility]  On 6/11/2024 at 2:53 PM  CRITICAL CARE SPECIALIST    This report has been created using voice recognition software. It may contain minor errors which are inherent in voice recognition technology.  Patient seen by me including key components of medical care.  Case discussed with resident physician.  Patient has a disease process which is progressive and beyond our ability to fix.  Her pancreatic cancer is stage IV and appears to be in both lungs.  We cannot

## 2024-06-11 NOTE — PROGRESS NOTES
Comprehensive Nutrition Assessment    Type and Reason for Visit:  Reassess (po/supplement)    Nutrition Recommendations/Plan:   Marinol started 6/7/24 for appetite stimulation.  Consider MVI.   Continue ONS: Ensure Plus 4 per day.  Encouraged patient to drink in between meals due to complaints of early satiety and abdominal bloating/distention.  Will provide yogurt and fresh fruit with meals per patient request.  Continue current diet. Encouraged oral intake and to include good protein sources.       Malnutrition Assessment:  Malnutrition Status:  Severe malnutrition (06/07/24 1157)    Context:  Chronic Illness     Findings of the 6 clinical characteristics of malnutrition:  Energy Intake:  75% or less estimated energy requirements for 1 month or longer (less than 50% since April)  Weight Loss:   (per EMR documentation: 34% loss in 9 months, 42% loss in 14 months; note she has been diuresing this admission but significant losses have occurred this past year)     Body Fat Loss:  Severe body fat loss Orbital, Fat Overlying Ribs, Buccal region, Triceps   Muscle Mass Loss:  Severe muscle mass loss Temples (temporalis), Scapula (trapezius), Clavicles (pectoralis & deltoids), Thigh (quadriceps), Calf (gastrocnemius), Hand (interosseous)  Fluid Accumulation:  No significant fluid accumulation     Strength:  Not Performed    Nutrition Assessment:     Pt. with no improvement from a nutritional standpoint AEB continued very poor appetite/intake.  Remains at risk for further nutritional compromise r/t severe malnutrition, acute hypoxic respiratory failure, HCA PNA, B/L PE, metastatic adenocarcinoma of pancrease - undergoing chemo at [facility] - small amount of ascites and s/p paracentesis 6/6, hypotension, and underlying medical condition (PMHx: DVT, dysphagia, esophageal dysmotility 2013, HTN, GERD, OA, rheumatoid arthritis, former tobacco use, s/p lumbar fusion 2020).      Nutrition Related Findings:    Pt.  related to catabolic illness, inadequate protein-energy intake as evidenced by Criteria as identified in malnutrition assessment    Nutrition Interventions:   Food and/or Nutrient Delivery: Continue Current Diet, Continue Oral Nutrition Supplement  Nutrition Education/Counseling: Education initiated  Coordination of Nutrition Care: Continue to monitor while inpatient, Interdisciplinary Rounds       Goals:     Goals: Meet at least 75% of estimated needs, by next RD assessment       Nutrition Monitoring and Evaluation:      Food/Nutrient Intake Outcomes: Food and Nutrient Intake, Supplement Intake, Vitamin/Mineral Intake  Physical Signs/Symptoms Outcomes: Biochemical Data, GI Status, Fluid Status or Edema, Nutrition Focused Physical Findings, Weight    Discharge Planning:    Continue Oral Nutrition Supplement     Allison C Schwab, RD, LD  Contact: [phone]

## 2024-06-12 NOTE — CARE COORDINATION
6/12/24, 3:04 PM EDT    DISCHARGE ON GOING EVALUATION    Malia GLOVER [facility] day: 7  Location: [facility]01/001-A Reason for admit: Septicemia (HCC) [A41.9]  Acute respiratory failure with hypoxia (HCC) [J96.01]  Acute on chronic respiratory failure with hypoxia (HCC) [J96.21]  Pneumonia of right middle lobe due to infectious organism [J18.9]  Pneumonia of right lower lobe due to infectious organism [J18.9]     Procedures:   6/6 US guided paracentesis: Successful ultrasound-guided paracentesis. 0.6 L of fluid drained.     Imaging since last note:   6/12 CXR: Pending     Barriers to Discharge: Transfer to ICU from  r/t hypotension. Intensivist and Pulmonology following. Fluid restriction. IS. Afebrile. HR 110s. RR 30s. BP 80s/60s. Not requiring pressors yet.  5L NC. Oriented x4. Following commands. Heparin gtt. IV bumex BID. Midodrine TID. Zofran prn. Roxicodone prn. WBC 20.3. CXR pending.    PCP: Gudio Deleon, APRN - CNP  Readmission Risk Score: 16.9    Patient Goals/Plan/Treatment Preferences: From home w/ spouse. Has DME. Current home O2 through [employer].     Transfer to [facility] initiated; awaiting bed.

## 2024-06-12 NOTE — PROGRESS NOTES
CRITICAL CARE PROGRESS NOTE      Patient:  Malia Pennington    Unit/Bed:4B-01/001-A  YOB: 1965  MRN: [id number]   PCP: Guido Deleon APRN - CNP  Date of Admission: 6/4/2024  Chief Complaint:-Shortness of breath    Assessment and Plan:    Acute on chronic hypoxic respiratory failure: Multifactorial in setting of bilateral submassive pulmonary emboli, metastatic disease to the lungs currently undergoing chemotherapy (chemotherapy induced pneumonitis?), Candida albicans on respiratory culture, fluid overload with bilateral pleural effusions and significantly elevated BNP (1690 on 6/4, repeat 17,569 on 6/11).  Unable to diurese 2/2 hypotension.  Continue IV diuretics twice daily, diuresing well  Continue nasal cannula, increase supplementation as necessary  Strict I's and O's/daily weights  Low-sodium/fluid restricted diet  Patient on nystatin as ordered by pulmonology team.  Acute submassive bilateral PE: First seen on CT chest 5/2012 24 per chart review.  Seen on CT chest again 6/4/2024.  Patient consistently tachycardic.  On Eliquis 5 mg twice daily.  Likely 2/2 hypercoagulable state in setting of stage IV pancreatic adenocarcinoma.  Unlikely progression of PE since patient has been on therapeutic anticoagulation.  Hypotension: Patient has been having issues with hypotension during this admission.  Was recently in the ICU, and went to stepdown 6/9 after blood pressure was stable.  Patient developed worsening shortness of breath and now CXR showing interstitial edema and R sided pleural effusion.  Diuresed well overnight.  Not yet required pressors.  Leukocytosis: Could be explained by metastatic adenocarcinoma.  Abdominal pain could be explained by that as well.  Patient has been on Zosyn for past 8 days.  Blood cultures/pneumonia panel/ascitic fluid/Legionella/MRSA PCR have all been negative.  Respiratory cultures did show Candida albicans.  Patient did not become febrile.  Repeat respiratory  Oral Daily with breakfast    folic acid  2,000 mcg Oral Daily    pantoprazole  40 mg Oral QAM AC     Continuous Infusions:   heparin (PORCINE) Infusion 18 Units/kg/hr (06/12/24 0625)    dextrose      sodium chloride         PHYSICAL EXAMINATION:  T: 97.4.  P: 120. RR:  26. B/P: 95/65.  FiO2: 39%, on 6 L NC. O2 Sat: 97.  I/O: 6/10 = 100/0, 6/11 = 200/1300, 6/12 = 316/500  Body mass index is 18.65 kg/m².   GCS:   15  General:   Ill-appearing, lying in bed, on nasal cannula, family at bedside  HEENT:  normocephalic and atraumatic.  No scleral icterus. PERR  Neck: supple.  No Thyromegaly.  Lungs: Crackles in bases bilaterally.  Still tachypneic, improved from yesterday, appears less distressed..  No retractions  Cardiac: Tachycardia, regular rhythm.  No JVD.  Abdomen: soft.  Mildly tender.  Extremities:  No clubbing, cyanosis, or edema x 4.    Vasculature: capillary refill < 3 seconds. Palpable dorsalis pedis pulses.  Skin:  warm and dry.  Psych:  Alert and oriented x3.  Affect appropriate  Lymph:  No supraclavicular adenopathy.  Neurologic:  No focal deficit. No seizures.  Moving all extremities.    Data: (All radiographs, tracings, PFTs, and imaging are personally viewed and interpreted unless otherwise noted).   BMP 6/12/2024: Sodium 137, potassium 3.9, chloride 101, bicarb 24, BUN 11, creatinine 0.6, AG 12  CBC 6/12/2024: WBC 20.3, hemoglobin 8.5, hematocrit 28.6, platelets 612  Telemetry shows sinus tachycardia        Seen with multidisciplinary ICU team.  Meets Continued ICU Level Care Criteria:    [x] Yes   [] No - Transfer Planned to listed location:  [] HOSPITALIST CONTACTED- DR     Case and plan discussed with Dr. Arango.    Electronically Signed by  Abdon Meraz DO, MBA  PGY-1 Internal Medicine Resident  [facility]  On 6/12/2024 at 9:38 AM  CRITICAL CARE SPECIALIST    This report has been created using voice recognition software. It may contain minor errors which are

## 2024-06-12 NOTE — PLAN OF CARE
Problem: Discharge Planning  Goal: Discharge to home or other facility with appropriate resources  Discharge to home or other facility with appropriate resources:   Identify barriers to discharge with patient and caregiver   Arrange for needed discharge resources and transportation as appropriate   Identify discharge learning needs (meds, wound care, etc)   Refer to discharge planning if patient needs post-hospital services based on physician order or complex needs related to functional status, cognitive ability or social support system     Problem: Pain  Goal: Verbalizes/displays adequate comfort level or baseline comfort level  Verbalizes/displays adequate comfort level or baseline comfort level:   Encourage patient to monitor pain and request assistance   Assess pain using appropriate pain scale   Administer analgesics based on type and severity of pain and evaluate response   Implement non-pharmacological measures as appropriate and evaluate response   Consider cultural and social influences on pain and pain management   Notify Licensed Independent Practitioner if interventions unsuccessful or patient reports new pain     Problem: Safety - Adult  Goal: Free from fall injury  Free From Fall Injury:   Instruct family/caregiver on patient safety     Problem: Chronic Conditions and Co-morbidities  Goal: Patient's chronic conditions and co-morbidity symptoms are monitored and maintained or improved  Care Plan - Patient's Chronic Conditions and Co-Morbidity Symptoms are Monitored and Maintained or Improved:   Monitor and assess patient's chronic conditions and comorbid symptoms for stability, deterioration, or improvement   Collaborate with multidisciplinary team to address chronic and comorbid conditions and prevent exacerbation or deterioration   Update acute care plan with appropriate goals if chronic or comorbid symptoms are exacerbated and prevent overall improvement and discharge     Problem: Respiratory -  Integrity  Goal: Absence of new skin breakdown  Description: 1.  Monitor for areas of redness and/or skin breakdown  2.  Assess vascular access sites hourly  3.  Every 4-6 hours minimum:  Change oxygen saturation probe site  4.  Every 4-6 hours:  If on nasal continuous positive airway pressure, respiratory therapy assess nares and determine need for appliance change or resting period.  Note: No new skin breakdown noted   Care plan reviewed with patient and .  Patient and  verbalizes understanding of the plan of care and contributes to goal setting.

## 2024-06-12 NOTE — SIGNIFICANT EVENT
Spoke with patient at bedside extensively regarding her prognosis prior to ICU admission.  Spoke with her about her metastatic pancreatic adenocarcinoma and that this is a terminal illness despite chemotherapy/radiation treatment.  She understands that we are not going to be able to cure her of this illness and can only delay her progressing symptoms.  She receives extensive care at [facility] in terms of her chemotherapy and plan to start radiation.  Spoke with her that she may need transfer there so she is under the care of her physicians as they navigate her cancer treatment.  She is in agreement with this plan and would like to go to [facility] for further care.      Transfer initiated 6/11/2024, currently waiting for bed placement.  Symptomatic treatment at this time with diuresis, oxygen supplementation, and pain control.    Electronically Signed by  Abdon Meraz DO, MBA  PGY-1 Internal Medicine Resident  [facility]  On 6/12/2024 at 1:15 PM    This report has been created using voice recognition software. It may contain minor errors which are inherent in voice recognition technology

## 2024-06-12 NOTE — PLAN OF CARE
Problem: Discharge Planning  Goal: Discharge to home or other facility with appropriate resources  Flowsheets (Taken 6/11/2024 2000)  Discharge to home or other facility with appropriate resources:   Identify barriers to discharge with patient and caregiver   Arrange for needed discharge resources and transportation as appropriate   Identify discharge learning needs (meds, wound care, etc)   Refer to discharge planning if patient needs post-hospital services based on physician order or complex needs related to functional status, cognitive ability or social support system     Problem: Pain  Goal: Verbalizes/displays adequate comfort level or baseline comfort level  Flowsheets (Taken 6/11/2024 2000)  Verbalizes/displays adequate comfort level or baseline comfort level:   Encourage patient to monitor pain and request assistance   Assess pain using appropriate pain scale   Administer analgesics based on type and severity of pain and evaluate response   Implement non-pharmacological measures as appropriate and evaluate response   Consider cultural and social influences on pain and pain management   Notify Licensed Independent Practitioner if interventions unsuccessful or patient reports new pain     Problem: Safety - Adult  Goal: Free from fall injury  Flowsheets (Taken 6/11/2024 2000)  Free From Fall Injury:   Instruct family/caregiver on patient safety   Based on caregiver fall risk screen, instruct family/caregiver to ask for assistance with transferring infant if caregiver noted to have fall risk factors     Problem: Chronic Conditions and Co-morbidities  Goal: Patient's chronic conditions and co-morbidity symptoms are monitored and maintained or improved  Flowsheets (Taken 6/11/2024 2000)  Care Plan - Patient's Chronic Conditions and Co-Morbidity Symptoms are Monitored and Maintained or Improved:   Monitor and assess patient's chronic conditions and comorbid symptoms for stability, deterioration, or improvement    of infection   Monitor lab/diagnostic results   Monitor all insertion sites i.e., indwelling lines, tubes and drains   Pioneer appropriate cooling/warming therapies per order   Administer medications as ordered   Instruct and encourage patient and family to use good hand hygiene technique   Identify and instruct in appropriate isolation precautions for identified infection/condition     Problem: Hematologic - Adult  Goal: Maintains hematologic stability  Flowsheets (Taken 6/11/2024 2000)  Maintains hematologic stability:   Assess for signs and symptoms of bleeding or hemorrhage   Monitor labs for bleeding or clotting disorders   Administer blood products/factors as ordered     Problem: Skin/Tissue Integrity  Goal: Absence of new skin breakdown  Description: 1.  Monitor for areas of redness and/or skin breakdown  2.  Assess vascular access sites hourly  3.  Every 4-6 hours minimum:  Change oxygen saturation probe site  4.  Every 4-6 hours:  If on nasal continuous positive airway pressure, respiratory therapy assess nares and determine need for appliance change or resting period.  Note: No new skin breakdown noted

## 2024-06-13 LAB
BACTERIA SPEC RESP CULT: NORMAL
CA-I BLD ISE-SCNC: 0.99 MMOL/L (ref 1.12–1.32)
CHLORIDE BLD-SCNC: 100 MEQ/L (ref 98–109)
GLUCOSE BLD-MCNC: 120 MG/DL (ref 70–108)
GRAM STN SPEC: NORMAL
LACTATE BLD-SCNC: 19.9 MMOL/L (ref 0.5–1.9)
POTASSIUM BLD-SCNC: 6.2 MEQ/L (ref 3.5–4.9)
SODIUM BLD-SCNC: 164 MEQ/L (ref 138–146)

## 2024-06-13 PROCEDURE — 3E03317 INTRODUCTION OF OTHER THROMBOLYTIC INTO PERIPHERAL VEIN, PERCUTANEOUS APPROACH: ICD-10-PCS | Performed by: STUDENT IN AN ORGANIZED HEALTH CARE EDUCATION/TRAINING PROGRAM

## 2024-06-13 NOTE — PROGRESS NOTES
[facility]  Notice of Patient Passing      Patient Name- Malia Pennington   Acct Number- [id number]   Attending Physician- Abdon Arango MD    Admitted on-6/4/2024  8:22 PM     On 6/12/2024 at 2300 patient was found in 4B01 with:   Absence of vital signs.   Absence of neurological response.    Confirmed time of death at 2300.   Physician or On-call Physician notified of time of death- yes    Family present at time of death- no   Spiritual care present at time of death- no    Physician was notified and orders were obtained to release the body.   Post-Mortem documentation completed; form printed, signed, and given to admitting.    Rayne Bartholomew RN RN Nursing Supervisor/ Manager  6/12/24   11:27 PM    Pt Name: Malia Pennington   Address: [address] [place] [address]  Phone: [phone] (home)    SSN:     MRN: [id number]    AGE: 58 y.o.   YOB: 1965    Black/  GENDER: female     Primary Care Physician: Guido Deleon, APRN - CNP   Attending Physician Name: Dr. Abdon Arango    Date of Death: 06/12/24  Time of Death: 2300  Pronounced By: Dr. Jerrod Hendrix      Emergency Contact:   Name of Family Notified of Death: Fred Pennington   Relationship to Patient: Spouse   Phone Number: [phone]    Cause of Death: cardiopulmonary arrest    Co-Morbidities:  Patient Active Problem List   Diagnosis    GERD (gastroesophageal reflux disease)    Chronic use of steroids    Tobacco abuse    Rheumatoid arthritis (HCC)    Anemia    Lumbar nerve root impingement    Cauda equina syndrome (HCC)    Immunosuppressed status (HCC)    Nosocomial pneumonia    Acute respiratory distress syndrome (ARDS) (HCC)    History of pulmonary embolus (PE)    Pancreatic cancer (HCC)    Acute on chronic respiratory failure with hypoxia (HCC)    Severe malnutrition (HCC)    Pleural effusion on right       Who Will Sign Death Certificate:     [] Dr. Abdon Arango

## 2024-06-13 NOTE — DISCHARGE SUMMARY
Discharge Summary    Date: 6/12/2024  Patient Name: Malia Pennington    YOB: 1965     Age: 58 y.o.    Admit Date: 6/4/2024  Discharge Date: 6/12/2024  Discharge Condition:    Admission Diagnosis  Septicemia (HCC) [A41.9];Acute respiratory failure with hypoxia (HCC) [J96.01];Acute on chronic respiratory failure with hypoxia (HCC) [J96.21];Pneumonia of right middle lobe due to infectious organism [J18.9];Pneumonia of right lower lobe due to infectious organism [J18.9]      Discharge Diagnosis  Principal Problem:    Acute on chronic respiratory failure with hypoxia (HCC)  Active Problems:    GERD (gastroesophageal reflux disease)    Tobacco abuse    Rheumatoid arthritis (HCC)    Immunosuppressed status (HCC)    Nosocomial pneumonia    Acute respiratory distress syndrome (ARDS) (HCC)    History of pulmonary embolus (PE)    Pancreatic cancer (HCC)    Severe malnutrition (HCC)    Pleural effusion on right  Resolved Problems:    Acute respiratory failure with hypoxia (HCC)      Hospital Stay  Narrative of Hospital Course:  Patient initially presenting with abdominal pain and shortness of breath and admitted for acute proximal respiratory failure requiring HFNC. Recently discharged from as he was found to have bilateral PEs in setting of metastatic pancreatic adenocarcinoma. Metastatic disease in the bone, lung, liver, and omentum. Follows at [facility]. Progressively worsening shortness of breath over the past few months. Undergoing chemotherapy of gemcitabine and paclitaxel, last received 5/17/2024. Patient was admitted to ICU 6/7/2024 here for hypotension, was discharged shortly after his hypotension resolved. Admitted back to ICU 6/11/2024 for similar issue. BNP elevated with increasing shortness of breath and pleural effusions, needs diuresis with likely pressor support.      6/12/2024: No acute events overnight.  Diuresed well.  Not yet requiring pressors.  Appears less short of breath and  applied    Consultants:  IP CONSULT TO PHARMACY  IP CONSULT TO PULMONOLOGY  IP CONSULT TO DIETITIAN  IP CONSULT TO PULMONOLOGY    Surgeries/procedures Performed:      Treatments:            Discharge Plan/Disposition:      Hospital/Incidental Findings Requiring Follow Up:    Patient Instructions:    Diet:    Activity:Activity as Tolerated  For number of days (if applicable):      Other Instructions:    Provider Follow-Up:   No follow-ups on file.     Significant Diagnostic Studies:    Recent Labs:  Admission on 2024  No results displayed because visit has over 200 results.    ------------    Radiology last 7 days:  XR CHEST PORTABLE    Result Date: 2024  1. No interval change since previous study dated 6/10/2024.. **This report has been created using voice recognition software. It may contain minor errors which are inherent in voice recognition technology.** Electronically signed by Dr. Estuardo Pham    XR CHEST (2 VW)    Result Date: 6/10/2024  1. Interstitial infiltrates are seen throughout the lungs bilaterally. This finding has worsened since the prior study. 2. Small bilateral pleural effusions, right greater than left. **This report has been created using voice recognition software. It may contain minor errors which are inherent in voice recognition technology.** Electronically signed by Dr Ceferino Diaz    US GUIDED PARACENTESIS    Result Date: 2024  Successful ultrasound-guided paracentesis. 0.6 L of fluid drained. **This report has been created using voice recognition software. It may contain minor errors which are inherent in voice recognition technology.** Electronically signed by Dr Ceferino Diaz       Pending Labs     Order Current Status    Basic Metabolic Panel In process    CBC In process    Cytology, Non-Gyn (Excluding Dioni and Brian) In process    Lactic Acid In process    Magnesium In process    Culture, Respiratory Preliminary result        Discharge Medications

## 2024-06-13 NOTE — PROGRESS NOTES
Pt's HR dropped to 10's suddenly the writer in the room and checked pulse. Pt has not pulse and code blue activated at 2230. Pt had TNK given at 2250 for suspected of PE. Multiple attempts to reach pt's spouse but not answering phone. Time of death called at 2300. See code narrator for code details.

## 2024-06-13 NOTE — PROCEDURES
PROCEDURE NOTE  Date: 6/12/2024   Name: Malia Pennington  YOB: 1965    Intubation    Date/Time: 6/12/2024 11:11 PM    Performed by: Faye Pham DO  Authorized by: Faye Pham DO  Consent: The procedure was performed in an emergent situation.  Indications: respiratory failure  Intubation method: direct  Patient status: unconscious  Preoxygenation: BVM  Pretreatment medications: none  Paralytic: none  Laryngoscope size: Mac 4  Tube size: 7.0 mm  Number of attempts: 3  Ventilation between attempts: BVM  Cords visualized: yes  Post-procedure assessment: CO2 detector and ETCO2 monitor  Breath sounds: equal  ETT to lip: 22 cm  Tube secured with: ETT oliver  Comments: Intubated patient during code blue. Unable to intubate the first 2 times as patient had a lot of gastric secretions and cords were not visualized. Continued suction and successfully intubated on third attempt. Dr. Arango available if needed.

## 2024-06-13 NOTE — DEATH NOTES
Death Pronouncement Note  Patient's Name: Malia Pennington   Patient's YOB: 1965  MRN Number: [id number]    Admitting Provider: Abdon Arango MD  Attending Provider: Abdon Arango MD    Patient was examined and the following were absent: Pulses, Blood Pressure, and Respiratory effort. Cardiac Standstill on cardiac ultrasound.    I declared the patient dead on 6/12/2024 at 11:00 PM    Preliminary Cause of Death: Cardiopulmonary arrest (HCC)     Electronically signed by Jerrod Hendrix MD on 6/12/24 at 11:07 PM EDT

## (undated) DEVICE — PADDING,UNDERCAST,COTTON, 4"X4YD STERILE: Brand: MEDLINE

## (undated) DEVICE — INTENDED FOR TISSUE SEPARATION, AND OTHER PROCEDURES THAT REQUIRE A SHARP SURGICAL BLADE TO PUNCTURE OR CUT.: Brand: BARD-PARKER ® CARBON RIB-BACK BLADES

## (undated) DEVICE — TUBING, SUCTION, 1/4" X 20', STRAIGHT: Brand: MEDLINE INDUSTRIES, INC.

## (undated) DEVICE — GAUZE,SPONGE,4"X4",12PLY,STERILE,LF,2'S: Brand: MEDLINE

## (undated) DEVICE — LINER SUCT CANSTR 1500CC SEMI RIG W/ POR HYDROPHOBIC SHUT

## (undated) DEVICE — GLOVE ORANGE PI 8   MSG9080

## (undated) DEVICE — 4-PORT MANIFOLD: Brand: NEPTUNE 2

## (undated) DEVICE — MICRO TIP WIPE: Brand: DEVON

## (undated) DEVICE — OIL CARTRIDGE: Brand: CORE, MAESTRO

## (undated) DEVICE — TAPE SURG W4INXL11YD 2IN PERF LINERLESS NONWOVEN MEDFIX EZ

## (undated) DEVICE — YANKAUER,BULB TIP,W/O VENT,RIGID,STERILE: Brand: MEDLINE

## (undated) DEVICE — GLOVE ORANGE PI 8 1/2   MSG9085

## (undated) DEVICE — GLOVE ORANGE PI 7   MSG9070

## (undated) DEVICE — SUTURE NRLN SZ 4-0 L18IN NONABSORBABLE BLK L13MM TF 1/2 CIR C584D

## (undated) DEVICE — PAD,NON-ADHERENT,3X8,STERILE,LF,1/PK: Brand: MEDLINE

## (undated) DEVICE — GAUZE,SPONGE,8"X4",12PLY,XRAY,STRL,LF: Brand: MEDLINE

## (undated) DEVICE — GLOVE SURG SZ 65 THK91MIL LTX FREE SYN POLYISOPRENE

## (undated) DEVICE — DRAIN SURG 10FR PVC TB W/ TRCR MID PERF NO RESVR HUBLESS

## (undated) DEVICE — DRAPE C ARM W36XL30IN RECTANG BND BG AND TAPE

## (undated) DEVICE — ZEISS MD MICROSCOPE DRAPE 54 X 120 - GLASS LENS: Brand: OPTICS ONE

## (undated) DEVICE — BIPOLAR SEALER 23-112-1 AQM 6.0: Brand: AQUAMANTYS ®

## (undated) DEVICE — 450 ML BOTTLE OF 0.05% CHLORHEXIDINE GLUCONATE IN 99.95% STERILE WATER FOR IRRIGATION, USP AND APPLICATOR.: Brand: IRRISEPT ANTIMICROBIAL WOUND LAVAGE

## (undated) DEVICE — CONMED DISPOSABLE BIPOLAR CABLE, 10' (3.05M): Brand: CONMED

## (undated) DEVICE — GOWN,SIRUS,NONRNF,SETINSLV,XL,20/CS: Brand: MEDLINE

## (undated) DEVICE — KIT EVAC 400CC PVC RADPQ Y CONN

## (undated) DEVICE — AGENT HEMSTAT W2XL14IN OXIDIZED REGENERATED CELOS ABSRB FOR

## (undated) DEVICE — SOLUTION IV IRRIG POUR BRL 0.9% SODIUM CHL 2F7124

## (undated) DEVICE — SUTURE ABSRB L45CM L36MM SZ 2-0 OS-6 VLT POLYGLACTIN 910 J710T

## (undated) DEVICE — SET AUTOTRNS C175ML BOWL BTM OUTLT RESERVOIRXTRA

## (undated) DEVICE — SYRINGE MED 30ML STD CLR PLAS LUERLOCK TIP N CTRL DISP

## (undated) DEVICE — SUTURE VCRL SZ 0 L45CM ABSRB VLT OS-6 L36.4MM 1/2 CIR REV J711T

## (undated) DEVICE — FLOSEAL HEMOSTATIC MATRIX, 5 ML: Brand: FLOSEAL

## (undated) DEVICE — 1010 S-DRAPE TOWEL DRAPE 10/BX: Brand: STERI-DRAPE™

## (undated) DEVICE — 3M™ STERI-DRAPE™ INSTRUMENT POUCH 1018: Brand: STERI-DRAPE™

## (undated) DEVICE — WAX SURG 2.5GM HEMSTAT BNE BEESWAX PARAFFIN ISO PALMITATE

## (undated) DEVICE — E-Z CLEAN, NON-STICK, PTFE COATED, ELECTROSURGICAL BLADE ELECTRODE, MODIFIED EXTENDED INSULATION, 4 INCH (10.2 CM): Brand: MEGADYNE

## (undated) DEVICE — SYRINGE IRRIG 60ML SFT PLIABLE BLB EZ TO GRP 1 HND USE W/

## (undated) DEVICE — CARBIDE MATCH HEAD

## (undated) DEVICE — SPONGE DRN W4XL4IN RAYON/POLYESTER 6 PLY NONWOVEN PRECUT

## (undated) DEVICE — TAPE ADH W2INXL10YD PLAS TRNSPAR H2O RESIST HYPOALRG CURAD

## (undated) DEVICE — HEAD POSITIONER, SURGICAL: Brand: DEROYAL

## (undated) DEVICE — 3M™ IOBAN™ 2 ANTIMICROBIAL INCISE DRAPE 6651EZ: Brand: IOBAN™ 2

## (undated) DEVICE — CODMAN® SURGICAL PATTIES 1/2" X1 1/2" (1.27CM X 3.81CM): Brand: CODMAN®

## (undated) DEVICE — CONTAINER,SPECIMEN,PNEU TUBE,4OZ,OR STRL: Brand: MEDLINE

## (undated) DEVICE — LINE ASPIR 1/4 ANTICOAG

## (undated) DEVICE — TOWEL,OR,DSP,ST,BLUE,STD,4/PK,20PK/CS: Brand: MEDLINE

## (undated) DEVICE — GOWN,SIRUS,NON REINFRCD,LARGE,SET IN SL: Brand: MEDLINE

## (undated) DEVICE — GARMENT,MEDLINE,DVT,INT,CALF,MED, GEN2: Brand: MEDLINE

## (undated) DEVICE — TOTAL TRAY, 16FR 10ML SIL FOLEY, URN: Brand: MEDLINE

## (undated) DEVICE — PREP SOL PVP IODINE 4%  4 OZ/BTL

## (undated) DEVICE — SPONGE GZ W4XL4IN COT 12 PLY TYP VII WVN C FLD DSGN

## (undated) DEVICE — DIFFUSER: Brand: CORE, MAESTRO